# Patient Record
Sex: FEMALE | Race: WHITE | Employment: OTHER | URBAN - METROPOLITAN AREA
[De-identification: names, ages, dates, MRNs, and addresses within clinical notes are randomized per-mention and may not be internally consistent; named-entity substitution may affect disease eponyms.]

---

## 2017-02-16 PROCEDURE — 88305 TISSUE EXAM BY PATHOLOGIST: CPT | Performed by: OBSTETRICS & GYNECOLOGY

## 2017-02-16 PROCEDURE — 88175 CYTOPATH C/V AUTO FLUID REDO: CPT | Performed by: OBSTETRICS & GYNECOLOGY

## 2017-02-16 PROCEDURE — 87624 HPV HI-RISK TYP POOLED RSLT: CPT | Performed by: OBSTETRICS & GYNECOLOGY

## 2017-03-01 PROBLEM — E78.5 HYPERLIPIDEMIA, UNSPECIFIED HYPERLIPIDEMIA TYPE: Status: ACTIVE | Noted: 2017-03-01

## 2017-03-01 PROCEDURE — 80053 COMPREHEN METABOLIC PANEL: CPT | Performed by: INTERNAL MEDICINE

## 2017-03-01 PROCEDURE — 85025 COMPLETE CBC W/AUTO DIFF WBC: CPT | Performed by: INTERNAL MEDICINE

## 2017-03-01 PROCEDURE — 36415 COLL VENOUS BLD VENIPUNCTURE: CPT | Performed by: INTERNAL MEDICINE

## 2017-03-01 PROCEDURE — 82570 ASSAY OF URINE CREATININE: CPT | Performed by: INTERNAL MEDICINE

## 2017-03-01 PROCEDURE — 83036 HEMOGLOBIN GLYCOSYLATED A1C: CPT | Performed by: INTERNAL MEDICINE

## 2017-03-01 PROCEDURE — 80061 LIPID PANEL: CPT | Performed by: INTERNAL MEDICINE

## 2017-03-01 PROCEDURE — 82043 UR ALBUMIN QUANTITATIVE: CPT | Performed by: INTERNAL MEDICINE

## 2017-04-24 PROBLEM — N19 RENAL FAILURE: Status: ACTIVE | Noted: 2017-04-24

## 2017-04-24 PROBLEM — N17.9 ACUTE RENAL FAILURE (ARF) (HCC): Status: ACTIVE | Noted: 2017-04-24

## 2017-04-24 PROCEDURE — 80050 GENERAL HEALTH PANEL: CPT | Performed by: INTERNAL MEDICINE

## 2017-04-24 PROCEDURE — 36415 COLL VENOUS BLD VENIPUNCTURE: CPT | Performed by: INTERNAL MEDICINE

## 2017-04-24 PROCEDURE — 82150 ASSAY OF AMYLASE: CPT | Performed by: INTERNAL MEDICINE

## 2017-04-24 PROCEDURE — 83690 ASSAY OF LIPASE: CPT | Performed by: INTERNAL MEDICINE

## 2017-04-24 PROCEDURE — 84439 ASSAY OF FREE THYROXINE: CPT | Performed by: INTERNAL MEDICINE

## 2017-04-24 RX ORDER — HEPARIN SODIUM 5000 [USP'U]/ML
5000 INJECTION, SOLUTION INTRAVENOUS; SUBCUTANEOUS EVERY 12 HOURS
Status: DISCONTINUED | OUTPATIENT
Start: 2017-04-25 | End: 2017-04-29

## 2017-04-24 RX ORDER — ACETAMINOPHEN 325 MG/1
650 TABLET ORAL EVERY 6 HOURS PRN
Status: DISCONTINUED | OUTPATIENT
Start: 2017-04-24 | End: 2017-04-29

## 2017-04-24 RX ORDER — SODIUM CHLORIDE 9 MG/ML
INJECTION, SOLUTION INTRAVENOUS CONTINUOUS
Status: DISCONTINUED | OUTPATIENT
Start: 2017-04-24 | End: 2017-04-27

## 2017-04-24 RX ORDER — DEXTROSE MONOHYDRATE 25 G/50ML
50 INJECTION, SOLUTION INTRAVENOUS AS NEEDED
Status: DISCONTINUED | OUTPATIENT
Start: 2017-04-24 | End: 2017-04-29

## 2017-04-24 RX ORDER — ONDANSETRON 2 MG/ML
4 INJECTION INTRAMUSCULAR; INTRAVENOUS EVERY 6 HOURS PRN
Status: DISCONTINUED | OUTPATIENT
Start: 2017-04-24 | End: 2017-04-29

## 2017-04-24 RX ORDER — AMLODIPINE BESYLATE 10 MG/1
10 TABLET ORAL DAILY
Status: DISCONTINUED | OUTPATIENT
Start: 2017-04-24 | End: 2017-04-26

## 2017-04-25 ENCOUNTER — APPOINTMENT (OUTPATIENT)
Dept: GENERAL RADIOLOGY | Facility: HOSPITAL | Age: 62
DRG: 683 | End: 2017-04-25
Attending: INTERNAL MEDICINE
Payer: COMMERCIAL

## 2017-04-25 ENCOUNTER — HOSPITAL ENCOUNTER (INPATIENT)
Facility: HOSPITAL | Age: 62
LOS: 4 days | Discharge: HOME | DRG: 683 | End: 2017-04-29
Attending: INTERNAL MEDICINE | Admitting: INTERNAL MEDICINE
Payer: COMMERCIAL

## 2017-04-25 ENCOUNTER — APPOINTMENT (OUTPATIENT)
Dept: ULTRASOUND IMAGING | Facility: HOSPITAL | Age: 62
DRG: 683 | End: 2017-04-25
Attending: INTERNAL MEDICINE
Payer: COMMERCIAL

## 2017-04-25 DIAGNOSIS — N17.1 ACUTE RENAL FAILURE WITH ACUTE CORTICAL NECROSIS (HCC): Primary | ICD-10-CM

## 2017-04-25 PROBLEM — E78.5 HYPERLIPIDEMIA, UNSPECIFIED HYPERLIPIDEMIA TYPE: Status: RESOLVED | Noted: 2017-03-01 | Resolved: 2017-04-25

## 2017-04-25 PROBLEM — E87.5 HYPERKALEMIA: Status: ACTIVE | Noted: 2017-04-25

## 2017-04-25 PROBLEM — N19 RENAL FAILURE: Status: RESOLVED | Noted: 2017-04-24 | Resolved: 2017-04-25

## 2017-04-25 PROCEDURE — 76770 US EXAM ABDO BACK WALL COMP: CPT

## 2017-04-25 PROCEDURE — 93005 ELECTROCARDIOGRAM TRACING: CPT

## 2017-04-25 PROCEDURE — 82962 GLUCOSE BLOOD TEST: CPT

## 2017-04-25 PROCEDURE — 84132 ASSAY OF SERUM POTASSIUM: CPT | Performed by: INTERNAL MEDICINE

## 2017-04-25 PROCEDURE — 83036 HEMOGLOBIN GLYCOSYLATED A1C: CPT | Performed by: INTERNAL MEDICINE

## 2017-04-25 PROCEDURE — 96360 HYDRATION IV INFUSION INIT: CPT

## 2017-04-25 PROCEDURE — 85025 COMPLETE CBC W/AUTO DIFF WBC: CPT | Performed by: INTERNAL MEDICINE

## 2017-04-25 PROCEDURE — 93010 ELECTROCARDIOGRAM REPORT: CPT | Performed by: INTERNAL MEDICINE

## 2017-04-25 PROCEDURE — 87086 URINE CULTURE/COLONY COUNT: CPT | Performed by: INTERNAL MEDICINE

## 2017-04-25 PROCEDURE — 80053 COMPREHEN METABOLIC PANEL: CPT | Performed by: INTERNAL MEDICINE

## 2017-04-25 PROCEDURE — 71020 XR CHEST PA + LAT CHEST (CPT=71020): CPT

## 2017-04-25 PROCEDURE — 81001 URINALYSIS AUTO W/SCOPE: CPT | Performed by: INTERNAL MEDICINE

## 2017-04-25 PROCEDURE — 85610 PROTHROMBIN TIME: CPT | Performed by: INTERNAL MEDICINE

## 2017-04-25 RX ORDER — SODIUM POLYSTYRENE SULFONATE 15 G/60ML
15 SUSPENSION ORAL; RECTAL ONCE
Status: COMPLETED | OUTPATIENT
Start: 2017-04-25 | End: 2017-04-25

## 2017-04-25 RX ORDER — SODIUM POLYSTYRENE SULFONATE 15 G/60ML
30 SUSPENSION ORAL; RECTAL ONCE
Status: COMPLETED | OUTPATIENT
Start: 2017-04-25 | End: 2017-04-25

## 2017-04-25 RX ORDER — 0.9 % SODIUM CHLORIDE 0.9 %
VIAL (ML) INJECTION
Status: COMPLETED
Start: 2017-04-25 | End: 2017-04-25

## 2017-04-25 NOTE — CONSULTS
[facility] HOSP - [facility]    Report of Consultation    Heidi Vivas Patient Status:  Inpatient    3/7/1955 MRN [id number]   Location [facility] 1SW Attending Steve Calles MD   Hosp Day # 0 PCP Annette Rudolph MD     Date of Admis g Oral Once   AmLODIPine Besylate (NORVASC) tab 10 mg 10 mg Oral Daily   0.9%  NaCl infusion  Intravenous Continuous   Heparin Sodium (Porcine) 5000 UNIT/ML injection 5,000 Units 5,000 Units Subcutaneous Q12H   acetaminophen (TYLENOL) tab 650 mg 650 mg Ora source Oral, resp. rate 18, height 5' 6\" (1.676 m), weight 371 lb 3.2 oz (168.375 kg), SpO2 98 %, not currently breastfeeding.   No intake or output data in the 24 hours ending 04/25/17 0939  Wt Readings from Last 1 Encounters:  04/25/17 : 371 lb 3.2 oz (1 bilateral LE. Obesity, Class II, BMI 35-39.9, with co morbidities               Thank you for allowing me to participate in the care of your patient.     Michael Smith  4/25/2017

## 2017-04-26 ENCOUNTER — APPOINTMENT (OUTPATIENT)
Dept: INTERVENTIONAL RADIOLOGY/VASCULAR | Facility: HOSPITAL | Age: 62
DRG: 683 | End: 2017-04-26
Attending: INTERNAL MEDICINE
Payer: COMMERCIAL

## 2017-04-26 PROCEDURE — 80069 RENAL FUNCTION PANEL: CPT | Performed by: INTERNAL MEDICINE

## 2017-04-26 PROCEDURE — 84132 ASSAY OF SERUM POTASSIUM: CPT | Performed by: INTERNAL MEDICINE

## 2017-04-26 PROCEDURE — 77001 FLUOROGUIDE FOR VEIN DEVICE: CPT

## 2017-04-26 PROCEDURE — 36558 INSERT TUNNELED CV CATH: CPT

## 2017-04-26 PROCEDURE — 5A1D60Z PERFORMANCE OF URINARY FILTRATION, MULTIPLE: ICD-10-PCS | Performed by: INTERNAL MEDICINE

## 2017-04-26 PROCEDURE — 90935 HEMODIALYSIS ONE EVALUATION: CPT

## 2017-04-26 PROCEDURE — 82962 GLUCOSE BLOOD TEST: CPT

## 2017-04-26 PROCEDURE — 87340 HEPATITIS B SURFACE AG IA: CPT | Performed by: INTERNAL MEDICINE

## 2017-04-26 PROCEDURE — 02H633Z INSERTION OF INFUSION DEVICE INTO RIGHT ATRIUM, PERCUTANEOUS APPROACH: ICD-10-PCS | Performed by: RADIOLOGY

## 2017-04-26 RX ORDER — HEPARIN SODIUM 1000 [USP'U]/ML
INJECTION, SOLUTION INTRAVENOUS; SUBCUTANEOUS
Status: COMPLETED
Start: 2017-04-26 | End: 2017-04-26

## 2017-04-26 RX ORDER — 0.9 % SODIUM CHLORIDE 0.9 %
VIAL (ML) INJECTION
Status: COMPLETED
Start: 2017-04-26 | End: 2017-04-26

## 2017-04-26 RX ORDER — SODIUM POLYSTYRENE SULFONATE 15 G/60ML
30 SUSPENSION ORAL; RECTAL ONCE
Status: DISCONTINUED | OUTPATIENT
Start: 2017-04-26 | End: 2017-04-29

## 2017-04-26 RX ORDER — MIDAZOLAM HYDROCHLORIDE 1 MG/ML
INJECTION INTRAMUSCULAR; INTRAVENOUS
Status: COMPLETED
Start: 2017-04-26 | End: 2017-04-26

## 2017-04-26 RX ORDER — LIDOCAINE HYDROCHLORIDE 20 MG/ML
INJECTION, SOLUTION EPIDURAL; INFILTRATION; INTRACAUDAL; PERINEURAL
Status: COMPLETED
Start: 2017-04-26 | End: 2017-04-26

## 2017-04-26 RX ORDER — ALBUMIN (HUMAN) 12.5 G/50ML
100 SOLUTION INTRAVENOUS AS NEEDED
Status: DISCONTINUED | OUTPATIENT
Start: 2017-04-26 | End: 2017-04-29

## 2017-04-26 NOTE — PROCEDURES
[facility] HOSP - [facility]  Procedure Note    Francois Childers Patient Status:  Inpatient    3/7/1955 MRN [id number]   Location [facility] Attending Yanet Villafana MD   Hosp Day # 1 PCP Delanna Holter, MD     Proce

## 2017-04-26 NOTE — PROGRESS NOTES
[place] [facility] - [facility]    Progress Note    Zarina Salmeron Patient Status:  Inpatient    3/7/1955 MRN [id number]   Location [facility] 5SW/SE Attending John Vaca MD   Hosp Day # 1 PCP Anegl Barcenas MD       Subjective:   Mar to PCP       HTN (hypertension)    BP noted. Will d/c Amlodipine for now .        Lymphedema /   Obesity   Chronic         Results:     Lab Results  Component Value Date   WBC 8.1 04/25/2017   HGB 11.4* 04/25/2017   HCT 34.6* 04/25/2017    04/25/2017

## 2017-04-26 NOTE — PROGRESS NOTES
[facility]    Progress Note    Krystyna Gutierrez Patient Status:  Inpatient    3/7/1955 MRN [id number]   Location [facility] 5SW/SE Attending Elizabeth Mark MD   Hosp Day # 1 PCP Freddy Wong MD     Subjective:     C 04/25/2017 at 14:01 by Anjana Grover MD      Assessment and Plan:   Principal Problem:    Acute renal failure (ARF) ([facility] [address]  Active Problems:    Type 2 diabetes mellitus without complication ([facility] [address]    HTN (hypertension)    Lymphedema    Obesity, Class II, B

## 2017-04-26 NOTE — PLAN OF CARE
Problem: Patient/Family Goals  Goal: Patient/Family Long Term Goal  Patient’s Long Term Goal: Go home    Interventions:  Admin meds as ordered  Monitor labs  VS  Follow up with MD as ordered  - See additional Care Plan goals for specific interventions   Ou

## 2017-04-27 PROCEDURE — 90935 HEMODIALYSIS ONE EVALUATION: CPT

## 2017-04-27 PROCEDURE — 82962 GLUCOSE BLOOD TEST: CPT

## 2017-04-27 PROCEDURE — 80069 RENAL FUNCTION PANEL: CPT | Performed by: INTERNAL MEDICINE

## 2017-04-27 RX ORDER — HEPARIN SODIUM 1000 [USP'U]/ML
1.5 INJECTION, SOLUTION INTRAVENOUS; SUBCUTANEOUS ONCE
Status: DISCONTINUED | OUTPATIENT
Start: 2017-04-27 | End: 2017-04-29

## 2017-04-27 RX ORDER — ALBUMIN (HUMAN) 12.5 G/50ML
100 SOLUTION INTRAVENOUS AS NEEDED
Status: DISCONTINUED | OUTPATIENT
Start: 2017-04-27 | End: 2017-04-29

## 2017-04-27 NOTE — DIABETES ED
Pt seen at bedside related to her diabetes self management. She does not regularly test her BG at home. However, she has a meter and current testing supplies and does test when she feels symptomatic.   She states she has rarely had a low blood sugar react

## 2017-04-27 NOTE — DISCHARGE PLANNING
Pt has started HD treatments. LYLE spoke with the nephrologist who stated the pt will not be ESRD, only acute kidney injury. Nephrologist provided the approval for LYLE to coordinate paperwork to start the process for dialysis in the community.     LYLE met with

## 2017-04-27 NOTE — PROGRESS NOTES
[facility]    Progress Note    Keerthi Sizer Patient Status:  Inpatient    3/7/1955 MRN [id number]   Location [facility] 5SW/SE Attending Jessie Darby MD   Hosp Day # 2 PCP Tripp Baca MD     Subjective:     C by IR  Olinda under reasonable control  BP stable      Maricruz Walsh MD  4/27/2017

## 2017-04-27 NOTE — PLAN OF CARE
Problem: Patient/Family Goals  Goal: Patient/Family Long Term Goal  Patient’s Long Term Goal: Go home    Interventions:  Admin meds as ordered  Monitor labs  VS  Follow up with MD as ordered  - See additional Care Plan goals for specific interventions   Ou abdominal folds, nystatin powder applied. Family staying overnight. Vitals stable.

## 2017-04-27 NOTE — PLAN OF CARE
Patient had dialysis catheter placed today; had first session of hemodialysis today; bariatric bed in room; family at bedside; all needs attended.

## 2017-04-28 ENCOUNTER — APPOINTMENT (OUTPATIENT)
Dept: INTERVENTIONAL RADIOLOGY/VASCULAR | Facility: HOSPITAL | Age: 62
DRG: 683 | End: 2017-04-28
Attending: INTERNAL MEDICINE
Payer: COMMERCIAL

## 2017-04-28 VITALS
WEIGHT: 293 LBS | RESPIRATION RATE: 18 BRPM | DIASTOLIC BLOOD PRESSURE: 71 MMHG | HEART RATE: 74 BPM | TEMPERATURE: 98 F | OXYGEN SATURATION: 96 % | HEIGHT: 66 IN | BODY MASS INDEX: 47.09 KG/M2 | SYSTOLIC BLOOD PRESSURE: 152 MMHG

## 2017-04-28 PROCEDURE — 85025 COMPLETE CBC W/AUTO DIFF WBC: CPT | Performed by: INTERNAL MEDICINE

## 2017-04-28 PROCEDURE — 85610 PROTHROMBIN TIME: CPT | Performed by: RADIOLOGY

## 2017-04-28 PROCEDURE — 85018 HEMOGLOBIN: CPT | Performed by: RADIOLOGY

## 2017-04-28 PROCEDURE — 82962 GLUCOSE BLOOD TEST: CPT

## 2017-04-28 PROCEDURE — 50200 RENAL BIOPSY PERQ: CPT

## 2017-04-28 PROCEDURE — 80069 RENAL FUNCTION PANEL: CPT | Performed by: INTERNAL MEDICINE

## 2017-04-28 PROCEDURE — 88305 TISSUE EXAM BY PATHOLOGIST: CPT | Performed by: INTERNAL MEDICINE

## 2017-04-28 PROCEDURE — 88325 CONSLTJ COMPRE RVW REC REPRT: CPT | Performed by: INTERNAL MEDICINE

## 2017-04-28 PROCEDURE — 0TB13ZX EXCISION OF LEFT KIDNEY, PERCUTANEOUS APPROACH, DIAGNOSTIC: ICD-10-PCS | Performed by: RADIOLOGY

## 2017-04-28 PROCEDURE — 76942 ECHO GUIDE FOR BIOPSY: CPT

## 2017-04-28 PROCEDURE — 85018 HEMOGLOBIN: CPT | Performed by: INTERNAL MEDICINE

## 2017-04-28 PROCEDURE — 85014 HEMATOCRIT: CPT | Performed by: INTERNAL MEDICINE

## 2017-04-28 RX ORDER — LIDOCAINE HYDROCHLORIDE 20 MG/ML
INJECTION, SOLUTION EPIDURAL; INFILTRATION; INTRACAUDAL; PERINEURAL
Status: COMPLETED
Start: 2017-04-28 | End: 2017-04-28

## 2017-04-28 RX ORDER — SODIUM CHLORIDE 9 MG/ML
INJECTION, SOLUTION INTRAVENOUS
Status: DISPENSED
Start: 2017-04-28 | End: 2017-04-28

## 2017-04-28 RX ORDER — ONDANSETRON 2 MG/ML
4 INJECTION INTRAMUSCULAR; INTRAVENOUS EVERY 6 HOURS PRN
Status: DISCONTINUED | OUTPATIENT
Start: 2017-04-28 | End: 2017-04-28

## 2017-04-28 RX ORDER — MIDAZOLAM HYDROCHLORIDE 1 MG/ML
INJECTION INTRAMUSCULAR; INTRAVENOUS
Status: DISPENSED
Start: 2017-04-28 | End: 2017-04-28

## 2017-04-28 RX ORDER — MIDAZOLAM HYDROCHLORIDE 1 MG/ML
1 INJECTION INTRAMUSCULAR; INTRAVENOUS EVERY 5 MIN PRN
Status: DISCONTINUED | OUTPATIENT
Start: 2017-04-28 | End: 2017-04-28

## 2017-04-28 RX ORDER — ALBUMIN (HUMAN) 12.5 G/50ML
100 SOLUTION INTRAVENOUS AS NEEDED
Status: DISCONTINUED | OUTPATIENT
Start: 2017-04-29 | End: 2017-04-29

## 2017-04-28 RX ORDER — FLUTICASONE PROPIONATE 50 MCG
2 SPRAY, SUSPENSION (ML) NASAL DAILY
Status: DISCONTINUED | OUTPATIENT
Start: 2017-04-28 | End: 2017-04-29

## 2017-04-28 RX ORDER — HEPARIN SODIUM 1000 [USP'U]/ML
1.5 INJECTION, SOLUTION INTRAVENOUS; SUBCUTANEOUS ONCE
Status: DISCONTINUED | OUTPATIENT
Start: 2017-04-29 | End: 2017-04-29

## 2017-04-28 RX ORDER — MIDAZOLAM HYDROCHLORIDE 1 MG/ML
INJECTION INTRAMUSCULAR; INTRAVENOUS
Status: DISCONTINUED
Start: 2017-04-28 | End: 2017-04-28

## 2017-04-28 RX ORDER — HEPARIN SODIUM 1000 [USP'U]/ML
INJECTION, SOLUTION INTRAVENOUS; SUBCUTANEOUS
Status: COMPLETED
Start: 2017-04-28 | End: 2017-04-28

## 2017-04-28 RX ORDER — ONDANSETRON 2 MG/ML
INJECTION INTRAMUSCULAR; INTRAVENOUS
Status: COMPLETED
Start: 2017-04-28 | End: 2017-04-28

## 2017-04-28 RX ORDER — HYDROCODONE BITARTRATE AND ACETAMINOPHEN 5; 325 MG/1; MG/1
1 TABLET ORAL EVERY 6 HOURS PRN
Status: DISCONTINUED | OUTPATIENT
Start: 2017-04-28 | End: 2017-04-29

## 2017-04-28 RX ORDER — CETIRIZINE HYDROCHLORIDE 10 MG/1
10 TABLET ORAL
Status: DISCONTINUED | OUTPATIENT
Start: 2017-04-28 | End: 2017-04-29

## 2017-04-28 NOTE — PLAN OF CARE
Problem: Patient/Family Goals  Goal: Patient/Family Long Term Goal  Patient’s Long Term Goal: Go home    Interventions:  Admin meds as ordered  Monitor labs  VS  Follow up with MD as ordered  - See additional Care Plan goals for specific interventions   Ou by assist and a cane. Fall precaution maintained. Patient for dialysis tomorrow. [facility]. Patient had CT guided renal biopsy today but was unsuccessful. Dressing to left lower back dry and intact.  Plan is for pt to go home after [facility]

## 2017-04-28 NOTE — DISCHARGE PLANNING
LYLE spoke with nephrologist who states the plan is now for [facility]. LYLE confirmed with Maeve Chew who states the pt would be on Tue/Thur/Sat 10am. Pt would start on Tuesday May 2 at 9:30am, as to complete the initial paperwork.  LYLE sent flowsheets to [facility] a

## 2017-04-28 NOTE — PLAN OF CARE
Problem: Patient/Family Goals  Goal: Patient/Family Long Term Goal  Patient’s Long Term Goal: Go home    Interventions:  Admin meds as ordered  Monitor labs  VS  Follow up with MD as ordered  - See additional Care Plan goals for specific interventions   Ou CHG bath done last night, will repeat this am prior procedure. Vitals stable.

## 2017-04-28 NOTE — PROGRESS NOTES
[facility]    Progress Note    Alejandra Drilling Patient Status:  Inpatient    3/7/1955 MRN [id number]   Location [facility] Attending Evelina Parker MD   Hosp Day # 3 PCP MD Brianna Bloom K 4.3 04/28/2017    04/28/2017   CO2 25 04/28/2017   * 04/28/2017   CA 9.2 04/28/2017   ALB 3.7 04/28/2017   ALKPHO 51 04/25/2017   BILT 0.6 04/25/2017   TP 7.7 04/25/2017   AST 22 04/25/2017   ALT 24 04/25/2017   INR 1.1 04/28/2017   T4F 1.

## 2017-04-28 NOTE — PLAN OF CARE
Patient a/ox4; denies pain; vss; went down for hemodialysis today; son at bedside; call light within reach.

## 2017-04-28 NOTE — PROGRESS NOTES
Patient pre scanned by Ultrasound to visualize kidney prior to biopsy. Difficult venipuncture for labs, after two attempts, labs drawn from brown port with ease and reheparinized as per standards.

## 2017-04-28 NOTE — PROGRESS NOTES
[facility]    Progress Note    Dorette Like Patient Status:  Inpatient    3/7/1955 MRN [id number]   Location [facility] 5SW/SE Attending Rachel Hayden MD   Hosp Day # 3 PCP Chaitanya Bolanos MD     Subjective:     C without complication (HCC)    HTN (hypertension)    Lymphedema    Obesity, Class II, BMI 35-39.9, with comorbidity    Hyperkalemia    Patient has tolerated first 2 dialysis sessions. Her potassium is now normalized. Unable to perform kidney biopsy.   Dionne

## 2017-04-28 NOTE — PROCEDURES
[facility] HOSP - [facility]  Procedure Note    Danica Bile Patient Status:  Inpatient    3/7/1955 MRN [id number]   Location [facility] Attending Pillo Macario MD   Hosp Day # 3 PCP Lin Bahena MD     Proce

## 2017-04-29 PROCEDURE — 90935 HEMODIALYSIS ONE EVALUATION: CPT

## 2017-04-29 PROCEDURE — 82962 GLUCOSE BLOOD TEST: CPT

## 2017-04-29 PROCEDURE — 81001 URINALYSIS AUTO W/SCOPE: CPT | Performed by: INTERNAL MEDICINE

## 2017-04-29 PROCEDURE — 85025 COMPLETE CBC W/AUTO DIFF WBC: CPT | Performed by: INTERNAL MEDICINE

## 2017-04-29 PROCEDURE — 80069 RENAL FUNCTION PANEL: CPT | Performed by: INTERNAL MEDICINE

## 2017-04-29 NOTE — PROGRESS NOTES
Notified Dr León Citizen the patient was able to void after rudolph removal but was having about 100 cc of blood clot. patient vital stable and will be d/c after dialysis.

## 2017-04-29 NOTE — PROGRESS NOTES
[facility] HOSP - [facility]    Progress Note    Jesusra Essex Patient Status:  Inpatient    3/7/1955 MRN [id number]   Location [facility] Attending Kendall Dandy, MD   Hosp Day # 4 PCP MD Genesis Waters reviewed. Cont same. Pt a bit upset that kidney fx not  Improving. She remains quite hopeful for recovery though. I explained we will cont to monitor as an outpt at dialysis. Spoke with Dr Tere King. Ashley Godfrey as well. Discharge planning discussed as well.

## 2017-04-30 ENCOUNTER — TELEPHONE (OUTPATIENT)
Dept: MEDSURG UNIT | Facility: HOSPITAL | Age: 62
End: 2017-04-30

## 2017-05-01 NOTE — DISCHARGE SUMMARY
[facility]    Discharge Summary    Ranulfo Mckeon Patient Status:  Inpatient    3/7/1955 MRN [id number]   Location [facility] 5SW/SE Attending No att. providers found   [facility] Day # 4 PCP Jaylyn Arredondo MD     Date of Ad outpatient.       Discharge Condition: Stable    Discharge Medications:      Discharge Medications      CONTINUE taking these medications       Instructions Prescription details    ACCU-CHEK AFIA SMARTVIEW w/Device Kit        1 strip by Does not apply route

## 2017-05-01 NOTE — PROGRESS NOTES
[place] [facility] - [facility]    Progress Note    Krystyna Gutierrez Patient Status:  Inpatient    3/7/1955 MRN [id number]   Location [facility] 5SW/SE Attending No att. providers found   Hosp Day # 4 PCP Freddy Wong MD       Subjective:

## 2017-05-03 NOTE — PAYOR COMM NOTE
PER [facility]CHANGE INPT AUTH #[id number] 3DAYS- REQUESTING ADDITIONAL DAYS      History and Physical  Dread Brambila  Patient Status:  Inpatient    Gary Miles 3/7/1955  MRN  [id number]    [facility] 1SW  Attending  Carlos Manuel Greenwood MD  Sister          sinus      Social History:    Smoking Status: Never Smoker                       Alcohol Use: JESSICA                   Allergies/Medications:    Allergies:   Barium                     Iodine (Topical)           Radiology Contrast *       Tjo for heartburn, abdominal pain, diarrhea and constipation. Endocrine: Negative for cold intolerance and heat intolerance. Genitourinary: Negative for dysuria, urgency, frequency, hematuria and menstrual problem.    Musculoskeletal: Positive for back pain HGB  11.4*  04/25/2017    HCT  34.6*  04/25/2017    PLT  151  04/25/2017    CREATSERUM  10.30*  04/24/2017    BUN  85*  04/24/2017    NA  139  04/24/2017    K  5.9*  04/24/2017    CL  110  04/24/2017    CO2  18.0*  04/24/2017    GLU  87  04/24/2017    CA agent.    Her chronic lymphedema has actually improved. Patient is supposed to be using compression stockings daily but is not always very compliant with this.     DVT prophylaxis with subcu heparin will be ordered               4/28/17  Subjective:      C failure (ARF) ([facility] [address]  Active Problems:    Type 2 diabetes mellitus without complication (HCC)    HTN (hypertension)    Lymphedema    Obesity, Class II, BMI 35-39.9, with comorbidity    Hyperkalemia    Patient has tolerated first 2 dialysis sessions.   Her po

## 2017-05-04 PROCEDURE — 86335 IMMUNFIX E-PHORSIS/URINE/CSF: CPT | Performed by: INTERNAL MEDICINE

## 2017-05-04 PROCEDURE — 86334 IMMUNOFIX E-PHORESIS SERUM: CPT | Performed by: INTERNAL MEDICINE

## 2017-05-04 PROCEDURE — 83883 ASSAY NEPHELOMETRY NOT SPEC: CPT | Performed by: INTERNAL MEDICINE

## 2017-05-04 PROCEDURE — 36415 COLL VENOUS BLD VENIPUNCTURE: CPT | Performed by: INTERNAL MEDICINE

## 2017-05-04 PROCEDURE — 84165 PROTEIN E-PHORESIS SERUM: CPT | Performed by: INTERNAL MEDICINE

## 2017-05-04 PROCEDURE — 84156 ASSAY OF PROTEIN URINE: CPT | Performed by: INTERNAL MEDICINE

## 2017-05-15 ENCOUNTER — APPOINTMENT (OUTPATIENT)
Dept: LAB | Age: 62
End: 2017-05-15
Attending: INTERNAL MEDICINE
Payer: COMMERCIAL

## 2017-05-15 DIAGNOSIS — C90.00 MULTIPLE MYELOMA NOT HAVING ACHIEVED REMISSION (HCC): ICD-10-CM

## 2017-05-15 PROBLEM — N08: Status: ACTIVE | Noted: 2017-05-15

## 2017-05-15 PROBLEM — N08 MYELOMA CAST NEPHROPATHY (HCC): Status: ACTIVE | Noted: 2017-05-15

## 2017-05-15 PROBLEM — N08 MYELOMA CAST NEPHROPATHY: Status: ACTIVE | Noted: 2017-05-15

## 2017-05-15 PROCEDURE — 88275 CYTOGENETICS 100-300: CPT | Performed by: INTERNAL MEDICINE

## 2017-05-15 PROCEDURE — 88264 CHROMOSOME ANALYSIS 20-25: CPT | Performed by: INTERNAL MEDICINE

## 2017-05-15 PROCEDURE — 85097 BONE MARROW INTERPRETATION: CPT

## 2017-05-15 PROCEDURE — 84165 PROTEIN E-PHORESIS SERUM: CPT | Performed by: INTERNAL MEDICINE

## 2017-05-15 PROCEDURE — 88184 FLOWCYTOMETRY/ TC 1 MARKER: CPT | Performed by: INTERNAL MEDICINE

## 2017-05-15 PROCEDURE — 88342 IMHCHEM/IMCYTCHM 1ST ANTB: CPT

## 2017-05-15 PROCEDURE — 83883 ASSAY NEPHELOMETRY NOT SPEC: CPT | Performed by: INTERNAL MEDICINE

## 2017-05-15 PROCEDURE — 88305 TISSUE EXAM BY PATHOLOGIST: CPT

## 2017-05-15 PROCEDURE — 88185 FLOWCYTOMETRY/TC ADD-ON: CPT | Performed by: INTERNAL MEDICINE

## 2017-05-15 PROCEDURE — 88341 IMHCHEM/IMCYTCHM EA ADD ANTB: CPT

## 2017-05-15 PROCEDURE — 88311 DECALCIFY TISSUE: CPT

## 2017-05-15 PROCEDURE — 88271 CYTOGENETICS DNA PROBE: CPT | Performed by: INTERNAL MEDICINE

## 2017-05-15 PROCEDURE — 88313 SPECIAL STAINS GROUP 2: CPT

## 2017-05-15 PROCEDURE — 86334 IMMUNOFIX E-PHORESIS SERUM: CPT | Performed by: INTERNAL MEDICINE

## 2017-05-15 PROCEDURE — 82232 ASSAY OF BETA-2 PROTEIN: CPT | Performed by: INTERNAL MEDICINE

## 2017-05-15 PROCEDURE — 88237 TISSUE CULTURE BONE MARROW: CPT | Performed by: INTERNAL MEDICINE

## 2017-06-13 ENCOUNTER — HOSPITAL ENCOUNTER (INPATIENT)
Facility: HOSPITAL | Age: 62
LOS: 2 days | Discharge: HOME | DRG: 683 | End: 2017-06-15
Attending: EMERGENCY MEDICINE | Admitting: INTERNAL MEDICINE
Payer: COMMERCIAL

## 2017-06-13 ENCOUNTER — APPOINTMENT (OUTPATIENT)
Dept: GENERAL RADIOLOGY | Facility: HOSPITAL | Age: 62
DRG: 683 | End: 2017-06-13
Attending: EMERGENCY MEDICINE
Payer: COMMERCIAL

## 2017-06-13 DIAGNOSIS — R50.9 FEVER, UNSPECIFIED FEVER CAUSE: Primary | ICD-10-CM

## 2017-06-13 DIAGNOSIS — N18.6 ESRD (END STAGE RENAL DISEASE) (HCC): ICD-10-CM

## 2017-06-13 PROBLEM — E87.1 HYPONATREMIA: Status: RESOLVED | Noted: 2017-06-13 | Resolved: 2017-06-13

## 2017-06-13 PROBLEM — N17.9 ACUTE KIDNEY INJURY (HCC): Status: RESOLVED | Noted: 2017-06-13 | Resolved: 2017-06-13

## 2017-06-13 PROBLEM — R73.9 HYPERGLYCEMIA: Status: ACTIVE | Noted: 2017-06-13

## 2017-06-13 PROBLEM — N17.9 ACUTE KIDNEY INJURY (HCC): Status: ACTIVE | Noted: 2017-06-13

## 2017-06-13 PROBLEM — E87.1 HYPONATREMIA: Status: ACTIVE | Noted: 2017-06-13

## 2017-06-13 PROBLEM — D64.9 ANEMIA: Status: ACTIVE | Noted: 2017-06-13

## 2017-06-13 PROBLEM — C90.00 MULTIPLE MYELOMA (HCC): Status: ACTIVE | Noted: 2017-06-13

## 2017-06-13 PROCEDURE — 87040 BLOOD CULTURE FOR BACTERIA: CPT | Performed by: EMERGENCY MEDICINE

## 2017-06-13 PROCEDURE — 83605 ASSAY OF LACTIC ACID: CPT | Performed by: EMERGENCY MEDICINE

## 2017-06-13 PROCEDURE — 93010 ELECTROCARDIOGRAM REPORT: CPT | Performed by: EMERGENCY MEDICINE

## 2017-06-13 PROCEDURE — 36415 COLL VENOUS BLD VENIPUNCTURE: CPT

## 2017-06-13 PROCEDURE — 82962 GLUCOSE BLOOD TEST: CPT

## 2017-06-13 PROCEDURE — 81001 URINALYSIS AUTO W/SCOPE: CPT | Performed by: INTERNAL MEDICINE

## 2017-06-13 PROCEDURE — 96366 THER/PROPH/DIAG IV INF ADDON: CPT

## 2017-06-13 PROCEDURE — 99285 EMERGENCY DEPT VISIT HI MDM: CPT

## 2017-06-13 PROCEDURE — 71010 XR CHEST AP PORTABLE  (CPT=71010): CPT | Performed by: EMERGENCY MEDICINE

## 2017-06-13 PROCEDURE — 80048 BASIC METABOLIC PNL TOTAL CA: CPT | Performed by: EMERGENCY MEDICINE

## 2017-06-13 PROCEDURE — 96365 THER/PROPH/DIAG IV INF INIT: CPT

## 2017-06-13 PROCEDURE — 93005 ELECTROCARDIOGRAM TRACING: CPT

## 2017-06-13 PROCEDURE — 85025 COMPLETE CBC W/AUTO DIFF WBC: CPT | Performed by: EMERGENCY MEDICINE

## 2017-06-13 RX ORDER — ONDANSETRON 2 MG/ML
4 INJECTION INTRAMUSCULAR; INTRAVENOUS EVERY 6 HOURS PRN
Status: DISCONTINUED | OUTPATIENT
Start: 2017-06-13 | End: 2017-06-15

## 2017-06-13 RX ORDER — BISACODYL 10 MG
10 SUPPOSITORY, RECTAL RECTAL
Status: DISCONTINUED | OUTPATIENT
Start: 2017-06-13 | End: 2017-06-15

## 2017-06-13 RX ORDER — SODIUM PHOSPHATE, DIBASIC AND SODIUM PHOSPHATE, MONOBASIC 7; 19 G/133ML; G/133ML
1 ENEMA RECTAL ONCE AS NEEDED
Status: DISCONTINUED | OUTPATIENT
Start: 2017-06-13 | End: 2017-06-14

## 2017-06-13 RX ORDER — POLYETHYLENE GLYCOL 3350 17 G/17G
17 POWDER, FOR SOLUTION ORAL DAILY PRN
Status: DISCONTINUED | OUTPATIENT
Start: 2017-06-13 | End: 2017-06-15

## 2017-06-13 RX ORDER — HEPARIN SODIUM 5000 [USP'U]/ML
5000 INJECTION, SOLUTION INTRAVENOUS; SUBCUTANEOUS EVERY 12 HOURS SCHEDULED
Status: DISCONTINUED | OUTPATIENT
Start: 2017-06-13 | End: 2017-06-15

## 2017-06-13 RX ORDER — ACETAMINOPHEN 325 MG/1
650 TABLET ORAL EVERY 6 HOURS PRN
Status: DISCONTINUED | OUTPATIENT
Start: 2017-06-13 | End: 2017-06-15

## 2017-06-13 RX ORDER — DOCUSATE SODIUM 100 MG/1
100 CAPSULE, LIQUID FILLED ORAL 2 TIMES DAILY
Status: DISCONTINUED | OUTPATIENT
Start: 2017-06-13 | End: 2017-06-15

## 2017-06-13 NOTE — PLAN OF CARE
Diabetes/Glucose Control    • Glucose maintained within prescribed range Progressing        Patient/Family Goals    • Patient/Family Long Term Goal Progressing    • Patient/Family Short Term Goal Progressing          Pt received from ED today post HD this

## 2017-06-13 NOTE — ED INITIAL ASSESSMENT (HPI)
Patient sent down from Dialysis for fever, states she had 1 hour of dialysis. Denies any symptoms.  Patient agitated in triage, screaming 'I hate this hospital' and responding 'does it matter, you guys are going to do what your going to do' when asked quest

## 2017-06-13 NOTE — ED PROVIDER NOTES
Patient Seen in: [facility] AND [facility] Emergency Department    History   Patient presents with:  Fever (infectious)    Stated Complaint: Fever    HPI    Patient presents the emergency department with shaking chills from dialysis.   She is presently undergoing 2 tablets (100 mg total) by mouth daily. Take once a day on days 1, 8, 15, and 22   Phenylephrine-Acetaminophen (WAL-PHED PE SINUS HEADACHE) 5-325 MG Oral Tab,  Take 2 capsules by mouth as needed.    TraMADol HCl 50 MG Oral Tab,  Take 1 tablet (50 mg total) HENT:   Head: Normocephalic. Eyes: Conjunctivae and EOM are normal.   Neck: Normal range of motion. Neck supple. Cardiovascular: Normal rate and regular rhythm. No murmur heard.   Pulmonary/Chest: Effort normal and breath sounds normal. No respirat RAINBOW DRAW LAVENDER   RAINBOW DRAW LIGHT GREEN   RAINBOW DRAW DARK GREEN   RAINBOW DRAW LAVENDER TALL (BNP)   BLOOD CULTURE   BLOOD CULTURE      EKG    Rate, intervals and axes as noted on EKG Report.   Rate: 133 bpm  Rhythm: Sinus tachycardia  Reading:

## 2017-06-13 NOTE — PROGRESS NOTES
[facility] dosing service    Initial Pharmacokinetic Consult for Vancomycin Dosing     Bj Shaw is a 63year old female admitted on 6/13 who is being treated for fever.   Pharmacy has been asked to dose Vancomycin by Dr. Pedrito Coon    She is Yanet Cueva Vancomycin to assess renal function. 4.  Pharmacy will follow and monitor renal function changes, toxicity and efficacy. Pharmacy will continue to follow her. We appreciate the opportunity to assist in her care.     Brian Martin, PharmD  6/13/2017

## 2017-06-13 NOTE — ED NOTES
liana Mendoza, drawing second set of blood cultures states patient was crying and visibly upset, asking for a DNR, she alerted this writer who informed dr Sandy Hernandez, he states patient will have to speak to an admitting physician about code status if she gets

## 2017-06-14 PROCEDURE — 82962 GLUCOSE BLOOD TEST: CPT

## 2017-06-14 PROCEDURE — 80069 RENAL FUNCTION PANEL: CPT | Performed by: INTERNAL MEDICINE

## 2017-06-14 PROCEDURE — 85025 COMPLETE CBC W/AUTO DIFF WBC: CPT | Performed by: INTERNAL MEDICINE

## 2017-06-14 PROCEDURE — 87086 URINE CULTURE/COLONY COUNT: CPT | Performed by: INTERNAL MEDICINE

## 2017-06-14 RX ORDER — ALBUMIN (HUMAN) 12.5 G/50ML
100 SOLUTION INTRAVENOUS AS NEEDED
Status: DISCONTINUED | OUTPATIENT
Start: 2017-06-15 | End: 2017-06-15

## 2017-06-14 RX ORDER — DIPHENHYDRAMINE HCL 50 MG
50 CAPSULE ORAL EVERY 6 HOURS PRN
Status: DISCONTINUED | OUTPATIENT
Start: 2017-06-14 | End: 2017-06-15

## 2017-06-14 RX ORDER — HEPARIN SODIUM 1000 [USP'U]/ML
1.5 INJECTION, SOLUTION INTRAVENOUS; SUBCUTANEOUS ONCE
Status: DISCONTINUED | OUTPATIENT
Start: 2017-06-15 | End: 2017-06-15

## 2017-06-14 NOTE — PLAN OF CARE
Diabetes/Glucose Control    • Glucose maintained within prescribed range Progressing        Patient/Family Goals    • Patient/Family Long Term Goal Progressing    • Patient/Family Short Term Goal Progressing          Pt resting in bed.  Uses cane when walki

## 2017-06-14 NOTE — PLAN OF CARE
Diabetes/Glucose Control    • Glucose maintained within prescribed range Progressing        Patient/Family Goals    • Patient/Family Long Term Goal Progressing    • Patient/Family Short Term Goal Progressing          Pt aware of, and compliant with, plan o

## 2017-06-14 NOTE — H&P
[facility]    History and Physical    Keerthi Sizer Patient Status:  Inpatient    3/7/1955 MRN [id number]   Location [facility] 4W/SW/SE Attending Jessie Darby MD   Hosp Day # 0 PCP Tripp Baca MD     Date:   Inject 28 Units into the skin every evening.  (Patient taking differently: Inject 24 Units into the skin every evening.  )   Glucose Blood (BLOOD GLUCOSE TEST) In Vitro Strip Test blood sugar four times daily   insulin aspart (NOVOLOG FLEXPEN) 100 UNIT/ML S light. No scleral icterus. Neck: Neck supple. No JVD present. No thyromegaly present. Cardiovascular: Normal rate, regular rhythm, normal heart sounds and intact distal pulses. No murmur heard. Carotid bruit not present.   Pulmonary/Chest: Effort norm RATE INCREASED Electronically signed on 06/13/2017 at 17:52 by Leticia Leone      Assessment/Plan:   Principal Problem:    Fever, unspecified fever cause  Active Problems:    [facility]    Type 2 diabetes mellitus without complication ([facility]

## 2017-06-14 NOTE — CONSULTS
Hematology/Oncology Consult Note        NAME: Keyana Reid - ROOM: 875920-Z - MRN: [id number] - Age: 63year old - : 3/7/1955    Reason for Consult:  Multiple myeloma.      Carlos Paz is a 63 y.o female well known to me whom i treat for the [facility] Subcutaneous 2 times per day   • docusate sodium  100 mg Oral BID   • cefTRIAXone  1 g Intravenous Q24H   • vancomycin  750 mg Intravenous See Admin Instructions (RX holding)        ALLERGIES:   Barium                    Iodine (Topical)          Radiology myeloma here for sepsis. Sepsis  - lactate much improved  - abx per pcp    Multiple myeloma  - treatment on hold for now pending clinical progress. Anemia/ thrombocytopenia  - infection vs recent cytoxan about 10 days ago.   - will continue to [facility]

## 2017-06-14 NOTE — PROGRESS NOTES
[facility]    Progress Note    Art Aland Patient Status:  Inpatient    3/7/1955 MRN [id number]   Location [facility] 4W/SW/SE Attending Rupa King MD   Hosp Day # 1 PCP David Vizcaino MD     Subjective: at 17:52 by Leonid Bryant      Assessment and Plan:   Principal Problem:    Fever, unspecified fever cause  Active Problems:    Multiple myeloma ([facility] [address] [address]    Type 2 diabetes mellitus without complication (HCC)    HTN (hypertension)    ESRD (end stage renal di

## 2017-06-14 NOTE — CONSULTS
[facility]    Report of Consultation    Ressie Stain Patient Status:  Inpatient    3/7/1955 MRN [id number]   Location [facility] 4W/SW/SE Attending Oracio Garcia MD   Hosp Day # 1 PCP Henrry Soria MD     Date of History  Patient Guardian Status:  Not on file.     Other Topics            Concern    None on file    Social History Narrative    None on file            Current Medications:    Current Facility-Administered Medications:  insulin aspart (NOVOLOG) 100 UNIT/ Toxoid              Review of Systems:   Pertinent items are noted in HPI. Physical Exam:   Blood pressure 117/60, pulse 60, temperature 98.3 °F (36.8 °C), temperature source Oral, resp.  rate 20, weight 357 lb 2.3 oz (162 kg), SpO2 98 %, not currently noted       Lymphedema   Much improved since HD started     D/w patient & daughter Jacqueline Mark in detail. Thank you for allowing me to participate in the care of your patient.     Laurel Shore  6/14/2017

## 2017-06-15 VITALS
DIASTOLIC BLOOD PRESSURE: 48 MMHG | TEMPERATURE: 98 F | OXYGEN SATURATION: 96 % | HEIGHT: 66.93 IN | SYSTOLIC BLOOD PRESSURE: 114 MMHG | RESPIRATION RATE: 21 BRPM | BODY MASS INDEX: 45.99 KG/M2 | HEART RATE: 67 BPM | WEIGHT: 293 LBS

## 2017-06-15 PROBLEM — R21 RASH: Status: ACTIVE | Noted: 2017-06-15

## 2017-06-15 PROCEDURE — 90935 HEMODIALYSIS ONE EVALUATION: CPT

## 2017-06-15 PROCEDURE — 80069 RENAL FUNCTION PANEL: CPT | Performed by: INTERNAL MEDICINE

## 2017-06-15 PROCEDURE — 5A1D00Z PERFORMANCE OF URINARY FILTRATION, SINGLE: ICD-10-PCS | Performed by: INTERNAL MEDICINE

## 2017-06-15 PROCEDURE — 85025 COMPLETE CBC W/AUTO DIFF WBC: CPT | Performed by: INTERNAL MEDICINE

## 2017-06-15 PROCEDURE — 82962 GLUCOSE BLOOD TEST: CPT

## 2017-06-15 RX ORDER — 0.9 % SODIUM CHLORIDE 0.9 %
VIAL (ML) INJECTION
Status: COMPLETED
Start: 2017-06-15 | End: 2017-06-15

## 2017-06-15 NOTE — DISCHARGE SUMMARY
[facility]    Discharge Summary    Elizabeth Bazan Patient Status:  Inpatient    3/7/1955 MRN [id number]   Location [facility] 4W/SW/SE Attending Leigh Ann López MD   Hosp Day # 2 PCP Carmen Kwon MD     Date of Adm Instructions Prescription details    Insulin Glargine 100 UNIT/ML Sopn   Commonly known as:  Valeria Rivera   What changed:  how much to take        Inject 28 Units into the skin every evening.     Quantity:  12 pen   Refills:  3         CONTINUE ta

## 2017-06-15 NOTE — PROGRESS NOTES
[facility]    Progress Note    Anabell Sood Patient Status:  Inpatient    3/7/1955 MRN [id number]   Location [facility] 4W/SW/SE Attending Mansoor Leary MD   Hosp Day # 2 PCP Elkin Prieto MD     Subjective: fevers  Dialysis line appears clear  Therefore at this time we'll discontinue Rocephin and vancomycin. We'll plan on discharge home today. We'll continue treatment with Adalid Andres at dialysis for 10 days to treat possible line infection.   I have spoken to ne

## 2017-06-15 NOTE — PROGRESS NOTES
[facility]    Progress Note    Alejandra Drilling Patient Status:  Inpatient    3/7/1955 MRN [id number]   Location [facility] 4W/SW/SE Attending Evelina Parker MD   Hosp Day # 2 PCP Maricruz Walsh MD       Subjective:   [place] 28  26          Xr Chest Ap Portable  (cpt=71010)    6/13/2017  CONCLUSION:  1. Atherosclerotic calcification aorta. 2. No acute cardiopulmonary finding. 3. Right-sided double-lumen catheter with distal lumen in the SVC.                Norberto Domingo MD  6

## 2017-06-15 NOTE — PROGRESS NOTES
120 [facility] dosing service    Follow-up Pharmacokinetic Consult for Vancomycin Dosing     Danica Lopez is a 63year old female admitted on 6/13 who is being treated for sepsis. Patient is ESRD on HD (Tue, Thu, Sat) is being dialyzed today.  Rica Alba is being dialyzed today. 1. Pharmacy will give supplemental dose of Vancomycin 750 mg IV x 1 (5 mg/kg Adj. BW) TODAY after HD session. 2.  Pharmacy will check Vancomycin levels prior to 3rd HD session. Goal trough level 15-20 ug/mL.     3.  Pharmacy fede

## 2017-06-15 NOTE — PLAN OF CARE
During assessment, patient complained of reddness on back, notified MD Pearl Pina. PRN Benadryl order given and initiated. Plan for dialysis tomorrow still. VSS.

## 2017-06-17 ENCOUNTER — TELEPHONE (OUTPATIENT)
Dept: CARDIOLOGY UNIT | Facility: HOSPITAL | Age: 62
End: 2017-06-17

## 2017-06-21 NOTE — PAYOR COMM NOTE
--------------  ADMISSION REVIEW     Payor: NAVA SPARKS  Subscriber #:  [id number]  Authorization Number: [id number]    Admit date: 6/13/2017 11:22 AM       Patient Seen in: [facility] Emergency Department    History   Patient presents with:  Fever (inf distress   HENT:   Head: Normocephalic. Eyes: Conjunctivae and EOM are normal.   Neck: Normal range of motion. Neck supple. Cardiovascular: Normal rate and regular rhythm. No murmur heard.   Pulmonary/Chest: Effort normal and breath sounds normal. No the hospital on IV antibiotics. Cultures were sent.       Disposition and Plan     Clinical Impression:  Fever, unspecified fever cause  (primary encounter diagnosis)  ESRD (end stage renal disease) ([facility] [address]    Disposition:  Admit        Present on Admission Seasonal                  Sulfa Antibiotics         Tetanus Toxoid                Review of Systems:     Constitutional: Positive for fever and chills. Negative for appetite change and fatigue.    HENT: Negative for congestion, ear pain, mouth sores, post Abdominal: Soft. She exhibits no distension and no mass. There is no tenderness. Musculoskeletal:   Chronic lymphedema changes bilateral lower extremities. Lymphadenopathy:     She has no cervical adenopathy.    Neurological: She is alert and oriented renal failure. Empircally treat with Vanc and Ceftriaxone pending culture results. Dialysis scheduled per nephrology. No stat need for dialysis at this time. Pt is currently receiving chemo for tx multiple myeloma per oncology.     Will monitor accu Lymphedema    Hyperglycemia    Patient is medically stable. Continue with current Rocephin, vancomycin as dosed per pharmacy pending culture results. Concern is about possible line infection and hopefully this can be cleared with current antibiotics.

## 2017-06-26 PROBLEM — F32.A DEPRESSION, UNSPECIFIED DEPRESSION TYPE: Status: ACTIVE | Noted: 2017-06-26

## 2017-06-26 PROBLEM — F41.9 ANXIETY: Status: ACTIVE | Noted: 2017-06-26

## 2017-06-26 PROBLEM — N17.9 ACUTE RENAL FAILURE, UNSPECIFIED ACUTE RENAL FAILURE TYPE (HCC): Status: ACTIVE | Noted: 2017-06-26

## 2017-06-26 PROBLEM — N08: Status: ACTIVE | Noted: 2017-06-26

## 2017-06-26 PROBLEM — R45.89 TEARFULNESS: Status: ACTIVE | Noted: 2017-06-26

## 2017-06-26 PROBLEM — Z79.4 TYPE 2 DIABETES MELLITUS WITHOUT COMPLICATION, WITH LONG-TERM CURRENT USE OF INSULIN (HCC): Status: ACTIVE | Noted: 2017-06-26

## 2017-06-26 PROBLEM — N08 MYELOMA CAST NEPHROPATHY (HCC): Status: ACTIVE | Noted: 2017-06-26

## 2017-06-26 PROBLEM — C90.00: Status: ACTIVE | Noted: 2017-06-26

## 2017-06-26 PROBLEM — IMO0001 OBESITY, CLASS II, BMI 35-39.9, WITH COMORBIDITY: Status: ACTIVE | Noted: 2017-06-26

## 2017-06-26 PROBLEM — E11.9 TYPE 2 DIABETES MELLITUS WITHOUT COMPLICATION, WITH LONG-TERM CURRENT USE OF INSULIN (HCC): Status: ACTIVE | Noted: 2017-06-26

## 2017-06-26 PROBLEM — N08 MYELOMA CAST NEPHROPATHY: Status: ACTIVE | Noted: 2017-06-26

## 2017-06-26 PROBLEM — C90.00 MULTIPLE MYELOMA, REMISSION STATUS UNSPECIFIED (HCC): Status: ACTIVE | Noted: 2017-06-26

## 2017-06-26 PROBLEM — N18.6 ESRD (END STAGE RENAL DISEASE) (HCC): Status: ACTIVE | Noted: 2017-06-26

## 2017-06-26 PROBLEM — C90.00 MYELOMA CAST NEPHROPATHY: Status: ACTIVE | Noted: 2017-06-26

## 2017-06-26 PROBLEM — C90.00 MYELOMA CAST NEPHROPATHY (HCC): Status: ACTIVE | Noted: 2017-06-26

## 2017-06-26 PROBLEM — D61.9 ANEMIA DUE TO BONE MARROW FAILURE, UNSPECIFIED BONE MARROW FAILURE TYPE (HCC): Status: ACTIVE | Noted: 2017-06-26

## 2017-06-26 PROCEDURE — 86334 IMMUNOFIX E-PHORESIS SERUM: CPT | Performed by: INTERNAL MEDICINE

## 2017-06-26 PROCEDURE — 83883 ASSAY NEPHELOMETRY NOT SPEC: CPT | Performed by: INTERNAL MEDICINE

## 2017-06-26 PROCEDURE — 84165 PROTEIN E-PHORESIS SERUM: CPT | Performed by: INTERNAL MEDICINE

## 2017-06-28 ENCOUNTER — HOSPITAL ENCOUNTER (OUTPATIENT)
Dept: INTERVENTIONAL RADIOLOGY/VASCULAR | Facility: HOSPITAL | Age: 62
Discharge: HOME | End: 2017-06-28
Attending: INTERNAL MEDICINE | Admitting: INTERNAL MEDICINE
Payer: COMMERCIAL

## 2017-06-28 VITALS
OXYGEN SATURATION: 97 % | WEIGHT: 293 LBS | BODY MASS INDEX: 56 KG/M2 | DIASTOLIC BLOOD PRESSURE: 63 MMHG | RESPIRATION RATE: 19 BRPM | SYSTOLIC BLOOD PRESSURE: 137 MMHG | HEART RATE: 77 BPM

## 2017-06-28 DIAGNOSIS — N18.6 ESRD (END STAGE RENAL DISEASE) (HCC): ICD-10-CM

## 2017-06-28 PROCEDURE — 99153 MOD SED SAME PHYS/QHP EA: CPT

## 2017-06-28 PROCEDURE — 87070 CULTURE OTHR SPECIMN AEROBIC: CPT | Performed by: RADIOLOGY

## 2017-06-28 PROCEDURE — 05PY33Z REMOVAL OF INFUSION DEVICE FROM UPPER VEIN, PERCUTANEOUS APPROACH: ICD-10-PCS | Performed by: RADIOLOGY

## 2017-06-28 PROCEDURE — 77001 FLUOROGUIDE FOR VEIN DEVICE: CPT

## 2017-06-28 PROCEDURE — 05HM33Z INSERTION OF INFUSION DEVICE INTO RIGHT INTERNAL JUGULAR VEIN, PERCUTANEOUS APPROACH: ICD-10-PCS | Performed by: RADIOLOGY

## 2017-06-28 PROCEDURE — 99152 MOD SED SAME PHYS/QHP 5/>YRS: CPT

## 2017-06-28 PROCEDURE — 36581 REPLACE TUNNELED CV CATH: CPT

## 2017-06-28 RX ORDER — HEPARIN SODIUM 1000 [USP'U]/ML
INJECTION, SOLUTION INTRAVENOUS; SUBCUTANEOUS
Status: COMPLETED
Start: 2017-06-28 | End: 2017-06-28

## 2017-06-28 RX ORDER — SODIUM CHLORIDE 9 MG/ML
INJECTION, SOLUTION INTRAVENOUS
Status: COMPLETED
Start: 2017-06-28 | End: 2017-06-28

## 2017-06-28 RX ORDER — MIDAZOLAM HYDROCHLORIDE 1 MG/ML
INJECTION INTRAMUSCULAR; INTRAVENOUS
Status: COMPLETED
Start: 2017-06-28 | End: 2017-06-28

## 2017-06-28 RX ORDER — HEPARIN SODIUM (PORCINE) LOCK FLUSH IV SOLN 100 UNIT/ML 100 UNIT/ML
1.5 SOLUTION INTRAVENOUS ONCE
Status: DISCONTINUED | OUTPATIENT
Start: 2017-06-28 | End: 2017-06-28

## 2017-06-28 RX ORDER — LIDOCAINE HYDROCHLORIDE 20 MG/ML
INJECTION, SOLUTION EPIDURAL; INFILTRATION; INTRACAUDAL; PERINEURAL
Status: COMPLETED
Start: 2017-06-28 | End: 2017-06-28

## 2017-06-28 RX ADMIN — SODIUM CHLORIDE: 9 INJECTION, SOLUTION INTRAVENOUS at 12:35:00

## 2017-07-17 ENCOUNTER — APPOINTMENT (OUTPATIENT)
Dept: GENERAL RADIOLOGY | Facility: HOSPITAL | Age: 62
DRG: 314 | End: 2017-07-17
Attending: EMERGENCY MEDICINE
Payer: COMMERCIAL

## 2017-07-17 ENCOUNTER — HOSPITAL ENCOUNTER (EMERGENCY)
Facility: HOSPITAL | Age: 62
Discharge: HOME | End: 2017-07-17

## 2017-07-17 ENCOUNTER — HOSPITAL ENCOUNTER (INPATIENT)
Facility: HOSPITAL | Age: 62
LOS: 5 days | Discharge: HOME | DRG: 314 | End: 2017-07-22
Attending: EMERGENCY MEDICINE | Admitting: INTERNAL MEDICINE
Payer: COMMERCIAL

## 2017-07-17 DIAGNOSIS — Z79.4 TYPE 2 DIABETES MELLITUS WITHOUT COMPLICATION, WITH LONG-TERM CURRENT USE OF INSULIN (HCC): ICD-10-CM

## 2017-07-17 DIAGNOSIS — N18.6 ESRD (END STAGE RENAL DISEASE) (HCC): ICD-10-CM

## 2017-07-17 DIAGNOSIS — C90.00 MULTIPLE MYELOMA, REMISSION STATUS UNSPECIFIED (HCC): ICD-10-CM

## 2017-07-17 DIAGNOSIS — R50.81 FEVER IN OTHER DISEASES: Primary | ICD-10-CM

## 2017-07-17 DIAGNOSIS — E11.9 TYPE 2 DIABETES MELLITUS WITHOUT COMPLICATION, WITH LONG-TERM CURRENT USE OF INSULIN (HCC): ICD-10-CM

## 2017-07-17 LAB
ANION GAP SERPL CALC-SCNC: 9 MMOL/L (ref 0–18)
APTT PPP: 34.3 SECONDS (ref 23.2–35.3)
BASOPHILS # BLD: 0.1 K/UL (ref 0–0.2)
BASOPHILS NFR BLD: 1 %
BILIRUB UR QL: NEGATIVE
BUN SERPL-MCNC: 20 MG/DL (ref 8–20)
BUN/CREAT SERPL: 4.8 (ref 10–20)
CALCIUM SERPL-MCNC: 8.3 MG/DL (ref 8.5–10.5)
CHLORIDE SERPL-SCNC: 99 MMOL/L (ref 95–110)
CO2 SERPL-SCNC: 27 MMOL/L (ref 22–32)
COLOR UR: YELLOW
CREAT SERPL-MCNC: 4.17 MG/DL (ref 0.5–1.5)
EOSINOPHIL # BLD: 0.1 K/UL (ref 0–0.7)
EOSINOPHIL NFR BLD: 1 %
ERYTHROCYTE [DISTWIDTH] IN BLOOD BY AUTOMATED COUNT: 17.5 % (ref 11–15)
GLUCOSE BLDC GLUCOMTR-MCNC: 232 MG/DL (ref 70–99)
GLUCOSE BLDC GLUCOMTR-MCNC: 252 MG/DL (ref 70–99)
GLUCOSE SERPL-MCNC: 276 MG/DL (ref 70–99)
GLUCOSE UR-MCNC: NEGATIVE MG/DL
HCT VFR BLD AUTO: 33.7 % (ref 35–48)
HGB BLD-MCNC: 10.8 G/DL (ref 12–16)
INR BLD: 1.1 (ref 0.9–1.2)
KETONES UR-MCNC: NEGATIVE MG/DL
LACTATE SERPL-SCNC: 1.9 MMOL/L (ref 0.5–2.2)
LACTATE SERPL-SCNC: 3.2 MMOL/L (ref 0.5–2.2)
LYMPHOCYTES # BLD: 1.4 K/UL (ref 1–4)
LYMPHOCYTES NFR BLD: 15 %
MCH RBC QN AUTO: 32.9 PG (ref 27–32)
MCHC RBC AUTO-ENTMCNC: 32 G/DL (ref 32–37)
MCV RBC AUTO: 102.9 FL (ref 80–100)
MONOCYTES # BLD: 0.7 K/UL (ref 0–1)
MONOCYTES NFR BLD: 8 %
MRSA DNA SPEC QL NAA+PROBE: NEGATIVE
NEUTROPHILS # BLD AUTO: 7.5 K/UL (ref 1.8–7.7)
NEUTROPHILS NFR BLD: 76 %
NITRITE UR QL STRIP.AUTO: NEGATIVE
OSMOLALITY UR CALC.SUM OF ELEC: 292 MOSM/KG (ref 275–295)
PH UR: 6 [PH] (ref 5–8)
PLATELET # BLD AUTO: 159 K/UL (ref 140–400)
PMV BLD AUTO: 9.3 FL (ref 7.4–10.3)
POTASSIUM SERPL-SCNC: 4.5 MMOL/L (ref 3.3–5.1)
PROT UR-MCNC: 100 MG/DL
PROTHROMBIN TIME: 14 SECONDS (ref 11.8–14.5)
RBC # BLD AUTO: 3.28 M/UL (ref 3.7–5.4)
RBC #/AREA URNS AUTO: 2 /HPF
SODIUM SERPL-SCNC: 135 MMOL/L (ref 136–144)
SP GR UR STRIP: 1.02 (ref 1–1.03)
UROBILINOGEN UR STRIP-ACNC: <2
VIT C UR-MCNC: NEGATIVE MG/DL
WBC # BLD AUTO: 9.9 K/UL (ref 4–11)
WBC #/AREA URNS AUTO: 6 /HPF

## 2017-07-17 PROCEDURE — 87040 BLOOD CULTURE FOR BACTERIA: CPT | Performed by: EMERGENCY MEDICINE

## 2017-07-17 PROCEDURE — 85610 PROTHROMBIN TIME: CPT | Performed by: EMERGENCY MEDICINE

## 2017-07-17 PROCEDURE — 84165 PROTEIN E-PHORESIS SERUM: CPT | Performed by: INTERNAL MEDICINE

## 2017-07-17 PROCEDURE — 87086 URINE CULTURE/COLONY COUNT: CPT | Performed by: EMERGENCY MEDICINE

## 2017-07-17 PROCEDURE — 87641 MR-STAPH DNA AMP PROBE: CPT | Performed by: EMERGENCY MEDICINE

## 2017-07-17 PROCEDURE — 99285 EMERGENCY DEPT VISIT HI MDM: CPT

## 2017-07-17 PROCEDURE — 71010 XR CHEST AP PORTABLE  (CPT=71010): CPT | Performed by: EMERGENCY MEDICINE

## 2017-07-17 PROCEDURE — 36415 COLL VENOUS BLD VENIPUNCTURE: CPT

## 2017-07-17 PROCEDURE — 83605 ASSAY OF LACTIC ACID: CPT

## 2017-07-17 PROCEDURE — 80048 BASIC METABOLIC PNL TOTAL CA: CPT

## 2017-07-17 PROCEDURE — 96365 THER/PROPH/DIAG IV INF INIT: CPT

## 2017-07-17 PROCEDURE — 82962 GLUCOSE BLOOD TEST: CPT

## 2017-07-17 PROCEDURE — 83883 ASSAY NEPHELOMETRY NOT SPEC: CPT | Performed by: INTERNAL MEDICINE

## 2017-07-17 PROCEDURE — 85025 COMPLETE CBC W/AUTO DIFF WBC: CPT

## 2017-07-17 PROCEDURE — 86334 IMMUNOFIX E-PHORESIS SERUM: CPT | Performed by: INTERNAL MEDICINE

## 2017-07-17 PROCEDURE — 87077 CULTURE AEROBIC IDENTIFY: CPT | Performed by: EMERGENCY MEDICINE

## 2017-07-17 PROCEDURE — 85730 THROMBOPLASTIN TIME PARTIAL: CPT | Performed by: EMERGENCY MEDICINE

## 2017-07-17 PROCEDURE — 81001 URINALYSIS AUTO W/SCOPE: CPT | Performed by: EMERGENCY MEDICINE

## 2017-07-17 PROCEDURE — 87186 SC STD MICRODIL/AGAR DIL: CPT | Performed by: EMERGENCY MEDICINE

## 2017-07-17 RX ORDER — SODIUM CHLORIDE 0.9 % (FLUSH) 0.9 %
3 SYRINGE (ML) INJECTION AS NEEDED
Status: DISCONTINUED | OUTPATIENT
Start: 2017-07-17 | End: 2017-07-22

## 2017-07-17 RX ORDER — ACETAMINOPHEN 325 MG/1
650 TABLET ORAL ONCE
Status: COMPLETED | OUTPATIENT
Start: 2017-07-17 | End: 2017-07-17

## 2017-07-17 RX ORDER — LORAZEPAM 1 MG/1
1 TABLET ORAL EVERY 4 HOURS PRN
Status: DISCONTINUED | OUTPATIENT
Start: 2017-07-17 | End: 2017-07-22

## 2017-07-17 RX ORDER — SODIUM CHLORIDE 9 MG/ML
INJECTION, SOLUTION INTRAVENOUS
Status: DISPENSED
Start: 2017-07-17 | End: 2017-07-18

## 2017-07-17 RX ORDER — HEPARIN SODIUM 5000 [USP'U]/ML
5000 INJECTION, SOLUTION INTRAVENOUS; SUBCUTANEOUS EVERY 12 HOURS SCHEDULED
Status: DISCONTINUED | OUTPATIENT
Start: 2017-07-17 | End: 2017-07-22

## 2017-07-17 RX ORDER — ESCITALOPRAM OXALATE 10 MG/1
10 TABLET ORAL DAILY
Status: DISCONTINUED | OUTPATIENT
Start: 2017-07-17 | End: 2017-07-22

## 2017-07-17 RX ORDER — ACETAMINOPHEN 325 MG/1
650 TABLET ORAL EVERY 6 HOURS PRN
Status: DISCONTINUED | OUTPATIENT
Start: 2017-07-17 | End: 2017-07-22

## 2017-07-17 RX ORDER — DEXAMETHASONE 2 MG/1
4 TABLET ORAL DAILY
Status: DISCONTINUED | OUTPATIENT
Start: 2017-07-17 | End: 2017-07-19

## 2017-07-17 NOTE — ED INITIAL ASSESSMENT (HPI)
Sent from her pmd office for poss infection at the diaylsis port. Pt has had a fever and cough for the past 2 weeks. Pt last chemo was 3 weeks ago.  Pt had cbc in office and her cbc was 11.5 and 10.8 hmgb

## 2017-07-17 NOTE — ED PROVIDER NOTES
Patient Seen in: [facility] AND [facility] Emergency Department    History   Patient presents with:  Cellulitis (integumentary, infectious)    Stated Complaint:     HPI    Patient with history of multiple myeloma recently diagnosed currently receiving chemothera sugar four times daily   insulin aspart (NOVOLOG FLEXPEN) 100 UNIT/ML Subcutaneous Solution Pen-injector,  Inject 10 Units into the skin 3 (three) times daily before meals.    Glucose Blood (ACCU-CHEK DEBORAH PLUS) In Vitro Strip,  1 each by Other route daily auscultation bilaterally with good effort. No wheezes, ronchi, or rales. Abdomen:  Soft, non-tender, non-distended. No masses, no hepato-splenomegaly. Negative McBurney's point tenderness. Musculoskeletal:  Good muscle tone.   Dialysis catheter in righ Glucose  252 (*)     All other components within normal limits   CBC W/ DIFFERENTIAL - Abnormal; Notable for the following:     RBC 3.28 (*)     HGB 10.8 (*)     HCT 33.7 (*)     .9 (*)     MCH 32.9 (*)     RDW 17.5 (*)     All other components with disposition on file for this visit. Follow-up:  No follow-up provider specified.     Medications Prescribed:  Current Discharge Medication List

## 2017-07-17 NOTE — H&P
[facility]    History and Physical    Chandra Essex Patient Status:  Emergency    3/7/1955 MRN [id number]   Location [address] Attending Trent Felipe MD   Hosp Day # 0 PCP Valeria Teran MD Alcohol use: No                Allergies/Medications:    Allergies:   Barium                    Iodine (Topical)          Radiology Contrast *      Seasonal                  Sulfa Antibiotics         Tetanus Toxoid            V Dialysis catheter right-sided chest appears clear without any erythema or warmth. There is no drainage noted. Abdominal: Soft. There is no tenderness. Musculoskeletal: She exhibits no tenderness.    Lymphadenopathy:     She has no cervical adenopathy chemotherapy regimen will be placed on hold. Oncology has been notified. Patient will also continue with her regular 3 times weekly dialysis. Nephrology has been notified. We'll monitor Accu-Cheks while patient is inpatient.   Continue with current

## 2017-07-17 NOTE — PROGRESS NOTES
Therapeutic Interchange insulin glargine (BASAGLAR) 100 UNIT/ML injection 24 Units wit insulin detemir (LEVEMIR) 100 UNIT/ML flextouch 24 Units    Gaby NEGRON,Ph.  [id number]  7/17/2017

## 2017-07-17 NOTE — ED NOTES
Dr. Paz Adame at bedside. Patient ambulatory to bathroom with daughter with severe dyspnea.  90% on RA, placed on 3L

## 2017-07-18 PROBLEM — R05.9 COUGH: Status: ACTIVE | Noted: 2017-07-18

## 2017-07-18 LAB
ANION GAP SERPL CALC-SCNC: 7 MMOL/L (ref 0–18)
BASOPHILS # BLD: 0 K/UL (ref 0–0.2)
BASOPHILS NFR BLD: 0 %
BUN SERPL-MCNC: 29 MG/DL (ref 8–20)
BUN/CREAT SERPL: 6.4 (ref 10–20)
CALCIUM SERPL-MCNC: 8.2 MG/DL (ref 8.5–10.5)
CHLORIDE SERPL-SCNC: 101 MMOL/L (ref 95–110)
CO2 SERPL-SCNC: 29 MMOL/L (ref 22–32)
CREAT SERPL-MCNC: 4.56 MG/DL (ref 0.5–1.5)
EOSINOPHIL # BLD: 0 K/UL (ref 0–0.7)
EOSINOPHIL NFR BLD: 0 %
ERYTHROCYTE [DISTWIDTH] IN BLOOD BY AUTOMATED COUNT: 17.2 % (ref 11–15)
GLUCOSE BLDC GLUCOMTR-MCNC: 125 MG/DL (ref 70–99)
GLUCOSE BLDC GLUCOMTR-MCNC: 198 MG/DL (ref 70–99)
GLUCOSE BLDC GLUCOMTR-MCNC: 81 MG/DL (ref 70–99)
GLUCOSE BLDC GLUCOMTR-MCNC: 91 MG/DL (ref 70–99)
GLUCOSE SERPL-MCNC: 108 MG/DL (ref 70–99)
HCT VFR BLD AUTO: 30.8 % (ref 35–48)
HGB BLD-MCNC: 10 G/DL (ref 12–16)
LYMPHOCYTES # BLD: 0.9 K/UL (ref 1–4)
LYMPHOCYTES NFR BLD: 8 %
MCH RBC QN AUTO: 33.1 PG (ref 27–32)
MCHC RBC AUTO-ENTMCNC: 32.6 G/DL (ref 32–37)
MCV RBC AUTO: 101.6 FL (ref 80–100)
METAMYELOCYTES # BLD MANUAL: 0.11 K/UL
METAMYELOCYTES NFR BLD: 1 %
MONOCYTES # BLD: 1.6 K/UL (ref 0–1)
MONOCYTES NFR BLD: 15 %
NEUTROPHILS # BLD AUTO: 8.1 K/UL (ref 1.8–7.7)
NEUTROPHILS NFR BLD: 70 %
NEUTS BAND NFR BLD: 6 %
OSMOLALITY UR CALC.SUM OF ELEC: 290 MOSM/KG (ref 275–295)
PLATELET # BLD AUTO: 136 K/UL (ref 140–400)
PMV BLD AUTO: 8.5 FL (ref 7.4–10.3)
POTASSIUM SERPL-SCNC: 4.6 MMOL/L (ref 3.3–5.1)
RBC # BLD AUTO: 3.03 M/UL (ref 3.7–5.4)
SODIUM SERPL-SCNC: 137 MMOL/L (ref 136–144)
WBC # BLD AUTO: 10.7 K/UL (ref 4–11)

## 2017-07-18 PROCEDURE — 85025 COMPLETE CBC W/AUTO DIFF WBC: CPT | Performed by: INTERNAL MEDICINE

## 2017-07-18 PROCEDURE — 90935 HEMODIALYSIS ONE EVALUATION: CPT

## 2017-07-18 PROCEDURE — 80048 BASIC METABOLIC PNL TOTAL CA: CPT | Performed by: INTERNAL MEDICINE

## 2017-07-18 PROCEDURE — 82962 GLUCOSE BLOOD TEST: CPT

## 2017-07-18 PROCEDURE — 87040 BLOOD CULTURE FOR BACTERIA: CPT | Performed by: INTERNAL MEDICINE

## 2017-07-18 RX ORDER — GUAIFENESIN 100 MG/5ML
100 SOLUTION ORAL EVERY 4 HOURS PRN
Status: DISCONTINUED | OUTPATIENT
Start: 2017-07-18 | End: 2017-07-22

## 2017-07-18 NOTE — CONSULTS
[facility] [place] AND [facility] Infectious Disease  Report of Consultation    Danica Lopez Patient Status:  Inpatient    3/7/1955 MRN [id number]   Location [facility] 2W/SW Attending Pillo Macario MD   Hosp Day # 1 PCP Lin Bahena MD reports that she has never smoked. She does not have any smokeless tobacco history on file. She reports that she does not drink alcohol or use drugs.     Allergies:    Barium                    Iodine (Topical)          Radiology Contrast *      Season Negative for easy bruising, bleeding, and lymphadenopathy. Musculoskeletal: Negative for myalgias, arthralgias, muscle weakness. Neurological: No focal neurologic deficits, seizures, tremors. Psych:  No h/o anxiety, depression, other psych d/o.    End 07/18/2017   K 4.6 07/18/2017    07/18/2017   CO2 29 07/18/2017    07/18/2017   CA 8.2 07/18/2017        Cultures:   Recent pseudomonas  Current cultures 7/17 positive for GNRs  7/18 cultures negative thus far    Radiology:  CXR no infiltrates

## 2017-07-18 NOTE — DIETARY NOTE
ADULT NUTRITION INITIAL ASSESSMENT    Pt is at moderate nutrition risk. Pt does not meet malnutrition criteria. RECOMMENDATIONS TO MD:  CPM. RE may add Nepro if po intake is <2/3-3/4 of needs. Will monitor po intake adequacy.  Resume diabetic diet ( (end stage renal disease) ([facility] [N18.6]  Type 2 diabetes mellitus without complication, with long-term current use of insulin (HCC) [E11.9, Z79.4]  Multiple myeloma, remission status unspecified (HCC) [C90.00]  Fever in other diseases [R50.81]      PERTINE (355 lb)       GASTROINTESTINAL PROBLEMS: none. States there is a medication that takes at home after chemo that causes some stomach upset. FOOD/NUTRITION RELATED HISTORY:  Appetite: Poor  Today . Intake: 75 % breakfast, 0 intake lunch.  Had some rigors

## 2017-07-18 NOTE — PROGRESS NOTES
[facility]    Progress Note    Darcie Barrett Patient Status:  Inpatient    3/7/1955 MRN [id number]   Location [facility] 2W/SW Attending Tawnya North MD   Hosp Day # 1 PCP Moreno Martinez MD     Subjective:     Jaqueline Rock remission status unspecified ([facility]    Type 2 diabetes mellitus without complication, with long-term current use of insulin (HCC)    Cough    Symptomatically improved, fever resolved  Blood cultures growing gram neg bacilli--final ID pending. ?  Urinary sonido

## 2017-07-18 NOTE — CONSULTS
[facility] AND CLINICS  Report of Consultation    Monicatracy Marie Patient Status:  Inpatient    3/7/1955 MRN [id number]   Location [facility] 2W/SW Attending Funmilayo Laureano MD   Hosp Day # 1 PCP Destin Morton MD     Reason for [facility] file. She reports that she does not drink alcohol or use drugs.     Allergies:    Barium                    Iodine (Topical)          Radiology Contrast *      Seasonal                  Sulfa Antibiotics         Tetanus Toxoid            Vancomycin Results  Component Value Date   WBC 10.7 07/18/2017   HGB 10.0 07/18/2017   HCT 30.8 07/18/2017    07/18/2017   CREATSERUM 4.56 07/18/2017   BUN 29 07/18/2017    07/18/2017   K 4.6 07/18/2017    07/18/2017   CO2 29 07/18/2017    0

## 2017-07-18 NOTE — CONSULTS
[facility]    Progress Note    Glorious Epley Patient Status:  Inpatient    3/7/1955 MRN [id number]   Location [facility] 2W/SW Attending Marium Gilliam MD   Hosp Day # 1 PCP Katrina Navas MD       Subjective:   Blanche Kearns 4.17*  4.17*  4.56*   GFRAA   --   13*  12*   GFRNAA   --   11*  10*   CA   --   8.3*  8.2*   NA  136  135*  137   K  4.3  4.5  4.6   CL  101  99  101   CO2  28.8  27  29          Xr Chest Ap Portable  (cpt=71010)    Result Date: 7/17/2017  CONCLUSION:  1.

## 2017-07-19 ENCOUNTER — APPOINTMENT (OUTPATIENT)
Dept: INTERVENTIONAL RADIOLOGY/VASCULAR | Facility: HOSPITAL | Age: 62
DRG: 314 | End: 2017-07-19
Attending: INTERNAL MEDICINE
Payer: COMMERCIAL

## 2017-07-19 ENCOUNTER — APPOINTMENT (OUTPATIENT)
Dept: CV DIAGNOSTICS | Facility: HOSPITAL | Age: 62
DRG: 314 | End: 2017-07-19
Attending: INTERNAL MEDICINE
Payer: COMMERCIAL

## 2017-07-19 LAB
ALBUMIN SERPL BCP-MCNC: 2.3 G/DL (ref 3.5–4.8)
ANION GAP SERPL CALC-SCNC: 7 MMOL/L (ref 0–18)
BASOPHILS # BLD: 0 K/UL (ref 0–0.2)
BASOPHILS NFR BLD: 0 %
BUN SERPL-MCNC: 19 MG/DL (ref 8–20)
BUN/CREAT SERPL: 5.5 (ref 10–20)
CALCIUM SERPL-MCNC: 8.3 MG/DL (ref 8.5–10.5)
CHLORIDE SERPL-SCNC: 100 MMOL/L (ref 95–110)
CO2 SERPL-SCNC: 30 MMOL/L (ref 22–32)
CREAT SERPL-MCNC: 3.43 MG/DL (ref 0.5–1.5)
EOSINOPHIL # BLD: 0.2 K/UL (ref 0–0.7)
EOSINOPHIL NFR BLD: 2 %
ERYTHROCYTE [DISTWIDTH] IN BLOOD BY AUTOMATED COUNT: 16.9 % (ref 11–15)
GLUCOSE BLDC GLUCOMTR-MCNC: 127 MG/DL (ref 70–99)
GLUCOSE BLDC GLUCOMTR-MCNC: 129 MG/DL (ref 70–99)
GLUCOSE BLDC GLUCOMTR-MCNC: 153 MG/DL (ref 70–99)
GLUCOSE BLDC GLUCOMTR-MCNC: 97 MG/DL (ref 70–99)
GLUCOSE SERPL-MCNC: 170 MG/DL (ref 70–99)
HCT VFR BLD AUTO: 29.2 % (ref 35–48)
HGB BLD-MCNC: 9.5 G/DL (ref 12–16)
LYMPHOCYTES # BLD: 0.9 K/UL (ref 1–4)
LYMPHOCYTES NFR BLD: 8 %
MCH RBC QN AUTO: 33 PG (ref 27–32)
MCHC RBC AUTO-ENTMCNC: 32.5 G/DL (ref 32–37)
MCV RBC AUTO: 101.6 FL (ref 80–100)
MONOCYTES # BLD: 0.8 K/UL (ref 0–1)
MONOCYTES NFR BLD: 7 %
NEUTROPHILS # BLD AUTO: 9.1 K/UL (ref 1.8–7.7)
NEUTROPHILS NFR BLD: 82 %
NEUTS BAND NFR BLD: 1 %
OSMOLALITY UR CALC.SUM OF ELEC: 290 MOSM/KG (ref 275–295)
PHOSPHATE SERPL-MCNC: 4.2 MG/DL (ref 2.4–4.7)
PLATELET # BLD AUTO: 114 K/UL (ref 140–400)
PMV BLD AUTO: 9.8 FL (ref 7.4–10.3)
POTASSIUM SERPL-SCNC: 4.3 MMOL/L (ref 3.3–5.1)
RBC # BLD AUTO: 2.88 M/UL (ref 3.7–5.4)
SODIUM SERPL-SCNC: 137 MMOL/L (ref 136–144)
WBC # BLD AUTO: 11 K/UL (ref 4–11)

## 2017-07-19 PROCEDURE — 02PY33Z REMOVAL OF INFUSION DEVICE FROM GREAT VESSEL, PERCUTANEOUS APPROACH: ICD-10-PCS | Performed by: RADIOLOGY

## 2017-07-19 PROCEDURE — 87070 CULTURE OTHR SPECIMN AEROBIC: CPT | Performed by: RADIOLOGY

## 2017-07-19 PROCEDURE — 82962 GLUCOSE BLOOD TEST: CPT

## 2017-07-19 PROCEDURE — 85025 COMPLETE CBC W/AUTO DIFF WBC: CPT | Performed by: INTERNAL MEDICINE

## 2017-07-19 PROCEDURE — 36589 REMOVAL TUNNELED CV CATH: CPT

## 2017-07-19 PROCEDURE — 93306 TTE W/DOPPLER COMPLETE: CPT | Performed by: INTERNAL MEDICINE

## 2017-07-19 PROCEDURE — 80069 RENAL FUNCTION PANEL: CPT | Performed by: INTERNAL MEDICINE

## 2017-07-19 RX ORDER — SODIUM CHLORIDE 9 MG/ML
INJECTION, SOLUTION INTRAVENOUS
Status: COMPLETED
Start: 2017-07-19 | End: 2017-07-19

## 2017-07-19 NOTE — PROGRESS NOTES
[facility]    Progress Note    Darcie Barrett Patient Status:  Inpatient    3/7/1955 MRN [id number]   Location [facility] 2W/SW Attending Tawnya North MD   Hosp Day # 2 PCP Moreno Martinez MD       Subjective:   Iram Ames (cpt=71010)    Result Date: 7/17/2017  CONCLUSION:  1. Patient rotation towards left limits evaluation. No evidence for pneumonia. 2. Atherosclerotic calcification aorta. 3. Right-sided double-lumen catheter with tip in SVC.                Rosa Roque MD

## 2017-07-19 NOTE — PLAN OF CARE
CARDIOVASCULAR - ADULT    • Maintains optimal cardiac output and hemodynamic stability Progressing    •   No ectopy at this time, denies chest pain/denies shortness of breath Progressing        Diabetes/Glucose Control    • Glucose maintained within prescr

## 2017-07-19 NOTE — PROGRESS NOTES
[facility]    Progress Note    Jonh Crump Patient Status:  Inpatient    3/7/1955 MRN [id number]   Location [facility] 2W/SW Attending Stephy Denis MD   Hosp Day # 2 PCP Lorena Mcmullen MD     Subjective:     Darlin Condon complication ([facility] [address] [facility]    HTN (hypertension)    ESRD (end stage renal disease) ([facility]    Lymphedema    Multiple myeloma, remission status unspecified (HCC)    Type 2 diabetes mellitus without complication, with long-term current use of insulin (HCC)    Cough

## 2017-07-19 NOTE — PROGRESS NOTES
[facility] AND [facility] Infectious Disease  Progress Note    Glorious Epley Patient Status:  Inpatient    3/7/1955 MRN [id number]   Location [facility] 2W/SW Attending Marium Gilliam MD   Hosp Day # 2 PCP Katrina Navas MD     Subject pseudomonas once again  - Source likely related to infected HD catheter vs. Other  - Just completed IV fortaz course with line exchange (placed in the same location ? residual tunnel infection)  - Now with recurrent fevers, + blood cultures once again  - Co

## 2017-07-19 NOTE — PROCEDURES
[facility]  Procedure Note    Jesus Essex Patient Status:  Inpatient    3/7/1955 MRN [id number]   Location [facility] 4W/SW/SE Attending Kendall Dandy, MD   Hosp Day # 2 PCP Valeria Teran MD     Procedure:  Leif Flores

## 2017-07-20 LAB
ALBUMIN SERPL BCP-MCNC: 2.3 G/DL (ref 3.5–4.8)
ANION GAP SERPL CALC-SCNC: 9 MMOL/L (ref 0–18)
BASOPHILS # BLD: 0.1 K/UL (ref 0–0.2)
BASOPHILS NFR BLD: 2 %
BUN SERPL-MCNC: 30 MG/DL (ref 8–20)
BUN/CREAT SERPL: 7.9 (ref 10–20)
CALCIUM SERPL-MCNC: 8.3 MG/DL (ref 8.5–10.5)
CHLORIDE SERPL-SCNC: 99 MMOL/L (ref 95–110)
CO2 SERPL-SCNC: 28 MMOL/L (ref 22–32)
CREAT SERPL-MCNC: 3.81 MG/DL (ref 0.5–1.5)
EOSINOPHIL # BLD: 0.2 K/UL (ref 0–0.7)
EOSINOPHIL NFR BLD: 3 %
ERYTHROCYTE [DISTWIDTH] IN BLOOD BY AUTOMATED COUNT: 16.4 % (ref 11–15)
GLUCOSE BLDC GLUCOMTR-MCNC: 114 MG/DL (ref 70–99)
GLUCOSE BLDC GLUCOMTR-MCNC: 172 MG/DL (ref 70–99)
GLUCOSE BLDC GLUCOMTR-MCNC: 206 MG/DL (ref 70–99)
GLUCOSE BLDC GLUCOMTR-MCNC: 92 MG/DL (ref 70–99)
GLUCOSE SERPL-MCNC: 156 MG/DL (ref 70–99)
HCT VFR BLD AUTO: 28.2 % (ref 35–48)
HGB BLD-MCNC: 9.3 G/DL (ref 12–16)
LYMPHOCYTES # BLD: 1.4 K/UL (ref 1–4)
LYMPHOCYTES NFR BLD: 17 %
MCH RBC QN AUTO: 32.6 PG (ref 27–32)
MCHC RBC AUTO-ENTMCNC: 33 G/DL (ref 32–37)
MCV RBC AUTO: 98.7 FL (ref 80–100)
MONOCYTES # BLD: 1.3 K/UL (ref 0–1)
MONOCYTES NFR BLD: 16 %
NEUTROPHILS # BLD AUTO: 4.9 K/UL (ref 1.8–7.7)
NEUTROPHILS NFR BLD: 62 %
OSMOLALITY UR CALC.SUM OF ELEC: 291 MOSM/KG (ref 275–295)
PHOSPHATE SERPL-MCNC: 4.4 MG/DL (ref 2.4–4.7)
PLATELET # BLD AUTO: 120 K/UL (ref 140–400)
PMV BLD AUTO: 9.9 FL (ref 7.4–10.3)
POTASSIUM SERPL-SCNC: 3.8 MMOL/L (ref 3.3–5.1)
RBC # BLD AUTO: 2.85 M/UL (ref 3.7–5.4)
SODIUM SERPL-SCNC: 136 MMOL/L (ref 136–144)
WBC # BLD AUTO: 7.9 K/UL (ref 4–11)

## 2017-07-20 PROCEDURE — 82962 GLUCOSE BLOOD TEST: CPT

## 2017-07-20 PROCEDURE — 85025 COMPLETE CBC W/AUTO DIFF WBC: CPT | Performed by: INTERNAL MEDICINE

## 2017-07-20 PROCEDURE — 80069 RENAL FUNCTION PANEL: CPT | Performed by: INTERNAL MEDICINE

## 2017-07-20 RX ORDER — ALBUMIN (HUMAN) 12.5 G/50ML
100 SOLUTION INTRAVENOUS
Status: DISCONTINUED | OUTPATIENT
Start: 2017-07-21 | End: 2017-07-22

## 2017-07-20 RX ORDER — HEPARIN SODIUM 1000 [USP'U]/ML
1.5 INJECTION, SOLUTION INTRAVENOUS; SUBCUTANEOUS
Status: DISCONTINUED | OUTPATIENT
Start: 2017-07-21 | End: 2017-07-22

## 2017-07-20 NOTE — PROGRESS NOTES
[facility]    Progress Note    Charo Mayer Patient Status:  Inpatient    3/7/1955 MRN [id number]   Location [facility] 4W/SW/SE Attending Deisy Vickers MD   Hosp Day # 3 PCP Perry Hyde MD     Subjective: Cough    Blood culture is growing Enterobacter. Echocardiogram showed no vegetations. Continue with current antibiotics per ID  Dialysis catheter has been removed.   Plan for insertion of new catheter in new site tomorrow  Holding off on dialysis until ne

## 2017-07-20 NOTE — PROGRESS NOTES
[facility] AND [facility] Infectious Disease Progress Note    Kelly Bonilla Patient Status:  Inpatient    3/7/1955 MRN [id number]   Location [facility] 4W/SW/SE Attending Lilibeth Baltazar MD   Hosp Day # 3 PCP MD Candido Allen not currently breastfeeding. Temp (24hrs), Av.8 °F (36.6 °C), Min:97.5 °F (36.4 °C), Max:98.3 °F (36.8 °C)      HEENT: Exam is unremarkable. Without scleral icterus. Mucous membranes are moist. PERRLA. Oropharynx is clear.   Neck: No tenderness to Chanda Romero and Dr. Halina Cleaning  -IR called ([facility]) will coordinate access for both HD and central access for IV abx    If you have any questions or concerns please call [facility] at [phone].      KOKO BAUER NP  7/20/2017  11:12 AM

## 2017-07-21 ENCOUNTER — APPOINTMENT (OUTPATIENT)
Dept: INTERVENTIONAL RADIOLOGY/VASCULAR | Facility: HOSPITAL | Age: 62
DRG: 314 | End: 2017-07-21
Attending: INTERNAL MEDICINE
Payer: COMMERCIAL

## 2017-07-21 LAB
ALBUMIN SERPL BCP-MCNC: 2.4 G/DL (ref 3.5–4.8)
ANION GAP SERPL CALC-SCNC: 8 MMOL/L (ref 0–18)
BASOPHILS # BLD: 0.2 K/UL (ref 0–0.2)
BASOPHILS NFR BLD: 2 %
BUN SERPL-MCNC: 32 MG/DL (ref 8–20)
BUN/CREAT SERPL: 7.9 (ref 10–20)
CALCIUM SERPL-MCNC: 8.6 MG/DL (ref 8.5–10.5)
CHLORIDE SERPL-SCNC: 103 MMOL/L (ref 95–110)
CO2 SERPL-SCNC: 28 MMOL/L (ref 22–32)
CREAT SERPL-MCNC: 4.05 MG/DL (ref 0.5–1.5)
EOSINOPHIL # BLD: 0.2 K/UL (ref 0–0.7)
EOSINOPHIL NFR BLD: 2 %
ERYTHROCYTE [DISTWIDTH] IN BLOOD BY AUTOMATED COUNT: 16.5 % (ref 11–15)
GLUCOSE BLDC GLUCOMTR-MCNC: 110 MG/DL (ref 70–99)
GLUCOSE BLDC GLUCOMTR-MCNC: 193 MG/DL (ref 70–99)
GLUCOSE BLDC GLUCOMTR-MCNC: 90 MG/DL (ref 70–99)
GLUCOSE BLDC GLUCOMTR-MCNC: 92 MG/DL (ref 70–99)
GLUCOSE SERPL-MCNC: 115 MG/DL (ref 70–99)
HCT VFR BLD AUTO: 28.6 % (ref 35–48)
HGB BLD-MCNC: 9.3 G/DL (ref 12–16)
LYMPHOCYTES # BLD: 1.5 K/UL (ref 1–4)
LYMPHOCYTES NFR BLD: 17 %
MCH RBC QN AUTO: 32.5 PG (ref 27–32)
MCHC RBC AUTO-ENTMCNC: 32.6 G/DL (ref 32–37)
MCV RBC AUTO: 99.9 FL (ref 80–100)
MONOCYTES # BLD: 1.1 K/UL (ref 0–1)
MONOCYTES NFR BLD: 12 %
NEUTROPHILS # BLD AUTO: 6 K/UL (ref 1.8–7.7)
NEUTROPHILS NFR BLD: 67 %
OSMOLALITY UR CALC.SUM OF ELEC: 296 MOSM/KG (ref 275–295)
PHOSPHATE SERPL-MCNC: 4.5 MG/DL (ref 2.4–4.7)
PLATELET # BLD AUTO: 132 K/UL (ref 140–400)
PMV BLD AUTO: 10.2 FL (ref 7.4–10.3)
POTASSIUM SERPL-SCNC: 4 MMOL/L (ref 3.3–5.1)
RBC # BLD AUTO: 2.86 M/UL (ref 3.7–5.4)
SODIUM SERPL-SCNC: 139 MMOL/L (ref 136–144)
WBC # BLD AUTO: 8.9 K/UL (ref 4–11)

## 2017-07-21 PROCEDURE — 99153 MOD SED SAME PHYS/QHP EA: CPT

## 2017-07-21 PROCEDURE — 36558 INSERT TUNNELED CV CATH: CPT

## 2017-07-21 PROCEDURE — 77001 FLUOROGUIDE FOR VEIN DEVICE: CPT

## 2017-07-21 PROCEDURE — 82962 GLUCOSE BLOOD TEST: CPT

## 2017-07-21 PROCEDURE — 85025 COMPLETE CBC W/AUTO DIFF WBC: CPT | Performed by: INTERNAL MEDICINE

## 2017-07-21 PROCEDURE — 80069 RENAL FUNCTION PANEL: CPT | Performed by: INTERNAL MEDICINE

## 2017-07-21 PROCEDURE — 05HN33Z INSERTION OF INFUSION DEVICE INTO LEFT INTERNAL JUGULAR VEIN, PERCUTANEOUS APPROACH: ICD-10-PCS | Performed by: RADIOLOGY

## 2017-07-21 PROCEDURE — 99152 MOD SED SAME PHYS/QHP 5/>YRS: CPT

## 2017-07-21 PROCEDURE — 5A1D00Z PERFORMANCE OF URINARY FILTRATION, SINGLE: ICD-10-PCS | Performed by: INTERNAL MEDICINE

## 2017-07-21 PROCEDURE — 02H633Z INSERTION OF INFUSION DEVICE INTO RIGHT ATRIUM, PERCUTANEOUS APPROACH: ICD-10-PCS | Performed by: RADIOLOGY

## 2017-07-21 RX ORDER — HEPARIN SODIUM (PORCINE) LOCK FLUSH IV SOLN 100 UNIT/ML 100 UNIT/ML
SOLUTION INTRAVENOUS
Status: COMPLETED
Start: 2017-07-21 | End: 2017-07-21

## 2017-07-21 RX ORDER — CIPROFLOXACIN 500 MG/1
500 TABLET, FILM COATED ORAL
Qty: 7 TABLET | Refills: 0 | Status: SHIPPED | OUTPATIENT
Start: 2017-07-21 | End: 2017-08-07

## 2017-07-21 RX ORDER — SODIUM CHLORIDE 9 MG/ML
INJECTION, SOLUTION INTRAVENOUS
Status: COMPLETED
Start: 2017-07-21 | End: 2017-07-21

## 2017-07-21 RX ORDER — MIDAZOLAM HYDROCHLORIDE 1 MG/ML
INJECTION INTRAMUSCULAR; INTRAVENOUS
Status: COMPLETED
Start: 2017-07-21 | End: 2017-07-21

## 2017-07-21 RX ORDER — SODIUM CHLORIDE 9 MG/ML
INJECTION, SOLUTION INTRAVENOUS
Status: DISPENSED
Start: 2017-07-21 | End: 2017-07-22

## 2017-07-21 RX ORDER — HEPARIN SODIUM 1000 [USP'U]/ML
INJECTION, SOLUTION INTRAVENOUS; SUBCUTANEOUS
Status: COMPLETED
Start: 2017-07-21 | End: 2017-07-21

## 2017-07-21 RX ORDER — LIDOCAINE HYDROCHLORIDE 20 MG/ML
INJECTION, SOLUTION EPIDURAL; INFILTRATION; INTRACAUDAL; PERINEURAL
Status: COMPLETED
Start: 2017-07-21 | End: 2017-07-21

## 2017-07-21 NOTE — PROCEDURES
[facility]  Procedure Note    Charo Done Patient Status:  Inpatient    3/7/1955 MRN [id number]   Location [facility] Attending Deisy Vickers MD   Hosp Day # 4 PCP Perry Hyde MD     Proce

## 2017-07-21 NOTE — PROGRESS NOTES
[facility]    Progress Note    Alejandra Drilling Patient Status:  Inpatient    3/7/1955 MRN [id number]   Location [facility] 4W/SW/SE Attending Evelina Parker MD   Hosp Day # 4 PCP Maricruz Walsh MD       Subjective:   Namita Benton 04/24/2017   PHOS 4.5 07/21/2017                   Eugenia Caballero MD  7/21/2017

## 2017-07-21 NOTE — PROGRESS NOTES
[facility]    Progress Note    Sheryle Royals Patient Status:  Inpatient    3/7/1955 MRN [id number]   Location [facility] 4W/SW/SE Attending Marin Jade MD   Hosp Day # 4 PCP Ish Goldberg MD     Subjective: myeloma (HCC)    Type 2 diabetes mellitus without complication ([facility]    HTN (hypertension)    ESRD (end stage renal disease) ([facility]    Lymphedema    Multiple myeloma, remission status unspecified (HCC)    Type 2 diabetes mellitus without complication, with

## 2017-07-21 NOTE — PROGRESS NOTES
[facility] [place] [facility] Infectious Disease Progress Note    Charo Mayer Patient Status:  Inpatient    3/7/1955 MRN [id number]   Location [facility] 4W/SW/SE Attending Deisy Vickers MD   Hosp Day # 4 PCP MD Filippo Barrera [employer]. No apparent distress. Vital Signs:  Blood pressure 138/65, pulse 80, temperature (!) 97.2 °F (36.2 °C), temperature source Oral, resp. rate 18, height 5' 7\" (1.702 m), weight (!) 355 lb (161 kg), SpO2 97 %, not currently breastfeeding.    Te will try to give IV Fortaz during HD again,   - will also give PO Cipro,     2.  Active fevers, chills, rigors at present  - Due to recurrent GNR sepsis  - Check 2D echo given recurrent infection - Negative     3.  H/o multiple myeloma, DM, and ESRD on HD

## 2017-07-22 VITALS
TEMPERATURE: 97 F | RESPIRATION RATE: 20 BRPM | WEIGHT: 293 LBS | BODY MASS INDEX: 45.99 KG/M2 | HEIGHT: 67 IN | DIASTOLIC BLOOD PRESSURE: 66 MMHG | OXYGEN SATURATION: 94 % | HEART RATE: 72 BPM | SYSTOLIC BLOOD PRESSURE: 127 MMHG

## 2017-07-22 LAB
GLUCOSE BLDC GLUCOMTR-MCNC: 106 MG/DL (ref 70–99)
GLUCOSE BLDC GLUCOMTR-MCNC: 151 MG/DL (ref 70–99)

## 2017-07-22 PROCEDURE — 82962 GLUCOSE BLOOD TEST: CPT

## 2017-07-22 RX ORDER — CIPROFLOXACIN 500 MG/1
500 TABLET, FILM COATED ORAL
Qty: 8 TABLET | Refills: 0 | Status: SHIPPED | OUTPATIENT
Start: 2017-07-22 | End: 2017-08-07

## 2017-07-22 NOTE — DISCHARGE SUMMARY
[facility]    Discharge Summary    Ashley Hernández Patient Status:  Inpatient    3/7/1955 MRN [id number]   Location [facility] 4W/SW/SE Attending Tracie Frias MD   Hosp Day # 5 PCP Meli Nelson MD     Date of Adm line was removed. Patient was given 2 day period without line and then new dialysis catheter line and central venous access line were placed. Patient was placed on meropenem. However her insurance would not cover this as outpatient.   Therefore patient w 30 tablet  Refills:  3     Glucose Blood Strp  Commonly known as:  ACCU-CHEK DEBORAH PLUS      1 each by Other route daily.    Quantity:  100 strip  Refills:  3     Blood Glucose Test Strp      Test blood sugar four times daily   Quantity:  400 strip  Refills

## 2017-07-22 NOTE — DISCHARGE PLANNING
Plan is for discharge home today and will have IV abx during dialysis. The pt.  Goes to [facility] and prescription for IV abx have been faxed to [facility].      F: [address] [facility], [address] [facility]

## 2017-07-22 NOTE — PROGRESS NOTES
[facility]    Progress Note    Zarina Sees Patient Status:  Inpatient    3/7/1955 MRN [id number]   Location [facility] 4W/SW/SE Attending John Vaca MD   Hosp Day # 5 PCP Angel Barcenas MD     Subjective: with long-term current use of insulin (HCC)    Cough    Patient had placement of dialysis catheter and central venous catheter done yesterday. She received dialysis yesterday and it is functioning well. She remains afebrile on current antibiotics.   There

## 2017-07-22 NOTE — PROGRESS NOTES
[facility] AND [facility] Infectious Disease Progress Note    Krystyna Gutierrez Patient Status:  Inpatient    3/7/1955 MRN [id number]   Location [facility] 4W/SW/SE Attending Elizabeth Mark MD   Hosp Day # 5 PCP MD Carli Frankel source Axillary, resp. rate 20, height 5' 7\" (1.702 m), weight (!) 355 lb (161 kg), SpO2 94 %, not currently breastfeeding. Temp (24hrs), Av.8 °F (36.6 °C), Min:97.5 °F (36.4 °C), Max:98.3 °F (36.8 °C)      HEENT: Exam is unremarkable.   Without scle

## 2017-07-23 ENCOUNTER — TELEPHONE (OUTPATIENT)
Dept: CARDIOLOGY UNIT | Facility: HOSPITAL | Age: 62
End: 2017-07-23

## 2017-07-24 ENCOUNTER — TELEPHONE (OUTPATIENT)
Dept: CARDIOLOGY UNIT | Facility: HOSPITAL | Age: 62
End: 2017-07-24

## 2017-07-31 PROCEDURE — 86334 IMMUNOFIX E-PHORESIS SERUM: CPT | Performed by: INTERNAL MEDICINE

## 2017-07-31 PROCEDURE — 83883 ASSAY NEPHELOMETRY NOT SPEC: CPT | Performed by: INTERNAL MEDICINE

## 2017-07-31 PROCEDURE — 84165 PROTEIN E-PHORESIS SERUM: CPT | Performed by: INTERNAL MEDICINE

## 2017-08-03 NOTE — CONSULTS
[facility]    Report of Consultation    Heidi Vivas Patient Status:  Inpatient    3/7/1955 MRN [id number]   Location [facility] 4W/SW/SE Attending No att. providers found   Hosp Day # 5 PCP Annette Rudolph MD     Date 1.3 mg/m2 (Treatment Plan Recorded) Subcutaneous Once       No prescriptions prior to admission.     Allergies    Barium                    Iodine (Topical)          Radiology Contrast *      Seasonal                  Sulfa Antibiotics         Tetanus Toxoi

## 2017-08-07 PROBLEM — D72.828 ACUTE NEUTROPHILIA: Status: ACTIVE | Noted: 2017-08-07

## 2017-08-07 PROCEDURE — 84165 PROTEIN E-PHORESIS SERUM: CPT | Performed by: INTERNAL MEDICINE

## 2017-08-07 PROCEDURE — 83883 ASSAY NEPHELOMETRY NOT SPEC: CPT | Performed by: INTERNAL MEDICINE

## 2017-08-07 PROCEDURE — 87040 BLOOD CULTURE FOR BACTERIA: CPT | Performed by: PHYSICIAN ASSISTANT

## 2017-08-07 PROCEDURE — 86334 IMMUNOFIX E-PHORESIS SERUM: CPT | Performed by: INTERNAL MEDICINE

## 2017-08-14 RX ORDER — SODIUM CHLORIDE, SODIUM LACTATE, POTASSIUM CHLORIDE, CALCIUM CHLORIDE 600; 310; 30; 20 MG/100ML; MG/100ML; MG/100ML; MG/100ML
INJECTION, SOLUTION INTRAVENOUS CONTINUOUS
Status: DISCONTINUED | OUTPATIENT
Start: 2017-08-14 | End: 2017-08-14

## 2017-08-16 NOTE — PAYOR COMM NOTE
--------------  DISCHARGE REVIEW    Payor: NAVA SPARKS  Subscriber #:  [id number]  Authorization Number: [id number]    Admit date: 7/17/17  Admit time:  1813  Discharge Date: 7/22/2017  1:13 PM     Admitting Physician: Zia Harvey MD  Attending Physician Type 2 diabetes mellitus without complication, with long-term current use of insulin (HCC)     Anemia due to bone marrow failure, unspecified bone marrow failure type     Obesity, Class II, BMI 35-39.9, with comorbidity     Myeloma cast nephropathy ([facility] [address] [address] Instructions Prescription details   insulin glargine 100 UNIT/ML Sopn  Commonly known as:  Nikyk Adorno  What changed:  how much to take      Inject 28 Units into the skin every evening.    Quantity:  12 pen  Refills:  3        CONTINUE taking these 7/17/17  Admit time: 8156       Admitting Physician: Henny Haddad MD  Attending Physician:  No att. providers found  Primary Care Physician: Henny Haddad MD    REVIEW DOCUMENTATION:     ED Provider Notes      ED Provider Notes signed by Cecilia Woody escitalopram 10 MG Oral Tab,  Take 1 tablet (10 mg total) by mouth daily. LORazepam (ATIVAN) 1 MG Oral Tab,  Take 1 tablet (1 mg total) by mouth every 4 (four) hours as needed for Anxiety.    dexamethasone (DECADRON) 4 MG tablet,  Take 4 mg by mouth daily 1500]  O2 Device: None (Room air) [07/17/17 1500][MH.2]    Current:[MH.1]/82   Pulse 105   Temp 101.5 °F (38.6 °C) (Oral)   Resp 22   Ht 170.2 cm (5' 7\")   Wt (!) 161 kg   SpO2 90%   BMI 55.60 kg/m²[MH.2]         Physical Exam  Constitutional:  Rodrigue BUN/CREA Ratio 4.8 (*)     GFR, Non- 11 (*)     GFR, -American 13 (*)     All other components within normal limits   LACTIC ACID, PLASMA - Abnormal; Notable for the following:     Lactic Acid 3.2 (*)     All other components within -----------         ------                     ED/MRSA SCREEN BY PCR-CC[[id number]]     Normal              Final result                 Please view results for these tests on the individual orders.    BLOOD CULTURE   BLOOD CULTURE   URINE CULTURE, ROUT

## 2017-08-18 ENCOUNTER — HOSPITAL ENCOUNTER (OUTPATIENT)
Facility: HOSPITAL | Age: 62
Setting detail: OBSERVATION
Discharge: HOME | End: 2017-08-19
Attending: SURGERY | Admitting: SURGERY
Payer: COMMERCIAL

## 2017-08-18 ENCOUNTER — SURGERY (OUTPATIENT)
Age: 62
End: 2017-08-18

## 2017-08-18 ENCOUNTER — ANESTHESIA EVENT (OUTPATIENT)
Dept: SURGERY | Facility: HOSPITAL | Age: 62
End: 2017-08-18
Payer: COMMERCIAL

## 2017-08-18 ENCOUNTER — ANESTHESIA (OUTPATIENT)
Dept: SURGERY | Facility: HOSPITAL | Age: 62
End: 2017-08-18
Payer: COMMERCIAL

## 2017-08-18 DIAGNOSIS — N18.5 CHRONIC RENAL FAILURE, STAGE 5 (HCC): Primary | ICD-10-CM

## 2017-08-18 LAB
GLUCOSE BLDC GLUCOMTR-MCNC: 147 MG/DL (ref 70–99)
POTASSIUM SERPL-SCNC: 4.6 MMOL/L (ref 3.3–5.1)

## 2017-08-18 PROCEDURE — 84132 ASSAY OF SERUM POTASSIUM: CPT | Performed by: SURGERY

## 2017-08-18 PROCEDURE — 82962 GLUCOSE BLOOD TEST: CPT

## 2017-08-18 PROCEDURE — 05SF3ZZ REPOSITION LEFT CEPHALIC VEIN, PERCUTANEOUS APPROACH: ICD-10-PCS | Performed by: SURGERY

## 2017-08-18 RX ORDER — NALOXONE HYDROCHLORIDE 0.4 MG/ML
80 INJECTION, SOLUTION INTRAMUSCULAR; INTRAVENOUS; SUBCUTANEOUS AS NEEDED
Status: DISCONTINUED | OUTPATIENT
Start: 2017-08-18 | End: 2017-08-19

## 2017-08-18 RX ORDER — BUPIVACAINE HYDROCHLORIDE 2.5 MG/ML
INJECTION, SOLUTION EPIDURAL; INFILTRATION; INTRACAUDAL AS NEEDED
Status: DISCONTINUED | OUTPATIENT
Start: 2017-08-18 | End: 2017-08-19

## 2017-08-18 RX ORDER — ACETAMINOPHEN 325 MG/1
650 TABLET ORAL ONCE
Status: COMPLETED | OUTPATIENT
Start: 2017-08-18 | End: 2017-08-18

## 2017-08-18 RX ORDER — MORPHINE SULFATE 2 MG/ML
2 INJECTION, SOLUTION INTRAMUSCULAR; INTRAVENOUS EVERY 10 MIN PRN
Status: DISCONTINUED | OUTPATIENT
Start: 2017-08-18 | End: 2017-08-19

## 2017-08-18 RX ORDER — HYDROCODONE BITARTRATE AND ACETAMINOPHEN 5; 325 MG/1; MG/1
1 TABLET ORAL AS NEEDED
Status: DISCONTINUED | OUTPATIENT
Start: 2017-08-18 | End: 2017-08-19

## 2017-08-18 RX ORDER — SODIUM CHLORIDE 9 MG/ML
INJECTION, SOLUTION INTRAVENOUS CONTINUOUS
Status: DISCONTINUED | OUTPATIENT
Start: 2017-08-18 | End: 2017-08-19

## 2017-08-18 RX ORDER — HYDROCODONE BITARTRATE AND ACETAMINOPHEN 10; 325 MG/1; MG/1
1 TABLET ORAL EVERY 6 HOURS PRN
Qty: 20 TABLET | Refills: 0 | Status: SHIPPED | OUTPATIENT
Start: 2017-08-18 | End: 2017-08-23

## 2017-08-18 RX ORDER — HYDROCODONE BITARTRATE AND ACETAMINOPHEN 5; 325 MG/1; MG/1
2 TABLET ORAL AS NEEDED
Status: DISCONTINUED | OUTPATIENT
Start: 2017-08-18 | End: 2017-08-19

## 2017-08-18 RX ORDER — HEPARIN SODIUM 1000 [USP'U]/ML
INJECTION, SOLUTION INTRAVENOUS; SUBCUTANEOUS AS NEEDED
Status: DISCONTINUED | OUTPATIENT
Start: 2017-08-18 | End: 2017-08-18 | Stop reason: SURG

## 2017-08-18 RX ORDER — LIDOCAINE HYDROCHLORIDE 10 MG/ML
INJECTION, SOLUTION EPIDURAL; INFILTRATION; INTRACAUDAL; PERINEURAL AS NEEDED
Status: DISCONTINUED | OUTPATIENT
Start: 2017-08-18 | End: 2017-08-18 | Stop reason: SURG

## 2017-08-18 RX ORDER — MORPHINE SULFATE 4 MG/ML
6 INJECTION, SOLUTION INTRAMUSCULAR; INTRAVENOUS EVERY 10 MIN PRN
Status: DISCONTINUED | OUTPATIENT
Start: 2017-08-18 | End: 2017-08-19

## 2017-08-18 RX ORDER — HYDROMORPHONE HYDROCHLORIDE 1 MG/ML
0.6 INJECTION, SOLUTION INTRAMUSCULAR; INTRAVENOUS; SUBCUTANEOUS EVERY 5 MIN PRN
Status: DISCONTINUED | OUTPATIENT
Start: 2017-08-18 | End: 2017-08-19

## 2017-08-18 RX ORDER — MORPHINE SULFATE 4 MG/ML
4 INJECTION, SOLUTION INTRAMUSCULAR; INTRAVENOUS EVERY 10 MIN PRN
Status: DISCONTINUED | OUTPATIENT
Start: 2017-08-18 | End: 2017-08-19

## 2017-08-18 RX ORDER — HYDROMORPHONE HYDROCHLORIDE 1 MG/ML
0.4 INJECTION, SOLUTION INTRAMUSCULAR; INTRAVENOUS; SUBCUTANEOUS EVERY 5 MIN PRN
Status: DISCONTINUED | OUTPATIENT
Start: 2017-08-18 | End: 2017-08-19

## 2017-08-18 RX ORDER — FAMOTIDINE 20 MG/1
20 TABLET ORAL ONCE
Status: COMPLETED | OUTPATIENT
Start: 2017-08-18 | End: 2017-08-18

## 2017-08-18 RX ORDER — ONDANSETRON 2 MG/ML
4 INJECTION INTRAMUSCULAR; INTRAVENOUS ONCE AS NEEDED
Status: ACTIVE | OUTPATIENT
Start: 2017-08-18 | End: 2017-08-18

## 2017-08-18 RX ORDER — HALOPERIDOL 5 MG/ML
0.25 INJECTION INTRAMUSCULAR ONCE AS NEEDED
Status: ACTIVE | OUTPATIENT
Start: 2017-08-18 | End: 2017-08-18

## 2017-08-18 RX ORDER — SODIUM CHLORIDE 9 MG/ML
INJECTION, SOLUTION INTRAVENOUS CONTINUOUS PRN
Status: DISCONTINUED | OUTPATIENT
Start: 2017-08-18 | End: 2017-08-18 | Stop reason: SURG

## 2017-08-18 RX ORDER — ONDANSETRON 2 MG/ML
INJECTION INTRAMUSCULAR; INTRAVENOUS AS NEEDED
Status: DISCONTINUED | OUTPATIENT
Start: 2017-08-18 | End: 2017-08-18 | Stop reason: SURG

## 2017-08-18 RX ORDER — SODIUM CHLORIDE, SODIUM LACTATE, POTASSIUM CHLORIDE, CALCIUM CHLORIDE 600; 310; 30; 20 MG/100ML; MG/100ML; MG/100ML; MG/100ML
INJECTION, SOLUTION INTRAVENOUS CONTINUOUS
Status: DISCONTINUED | OUTPATIENT
Start: 2017-08-18 | End: 2017-08-19

## 2017-08-18 RX ORDER — HYDROMORPHONE HYDROCHLORIDE 1 MG/ML
0.2 INJECTION, SOLUTION INTRAMUSCULAR; INTRAVENOUS; SUBCUTANEOUS EVERY 5 MIN PRN
Status: DISCONTINUED | OUTPATIENT
Start: 2017-08-18 | End: 2017-08-19

## 2017-08-18 RX ADMIN — ONDANSETRON 4 MG: 2 INJECTION INTRAMUSCULAR; INTRAVENOUS at 21:51:00

## 2017-08-18 RX ADMIN — HEPARIN SODIUM 3000 UNITS: 1000 INJECTION, SOLUTION INTRAVENOUS; SUBCUTANEOUS at 22:31:00

## 2017-08-18 RX ADMIN — SODIUM CHLORIDE: 9 INJECTION, SOLUTION INTRAVENOUS at 23:27:00

## 2017-08-18 RX ADMIN — SODIUM CHLORIDE: 9 INJECTION, SOLUTION INTRAVENOUS at 21:44:00

## 2017-08-18 RX ADMIN — LIDOCAINE HYDROCHLORIDE 50 MG: 10 INJECTION, SOLUTION EPIDURAL; INFILTRATION; INTRACAUDAL; PERINEURAL at 21:51:00

## 2017-08-19 VITALS
OXYGEN SATURATION: 99 % | DIASTOLIC BLOOD PRESSURE: 63 MMHG | WEIGHT: 293 LBS | BODY MASS INDEX: 45.99 KG/M2 | TEMPERATURE: 98 F | HEIGHT: 67 IN | SYSTOLIC BLOOD PRESSURE: 141 MMHG | HEART RATE: 72 BPM | RESPIRATION RATE: 16 BRPM

## 2017-08-19 LAB — GLUCOSE BLDC GLUCOMTR-MCNC: 124 MG/DL (ref 70–99)

## 2017-08-19 PROCEDURE — 82962 GLUCOSE BLOOD TEST: CPT

## 2017-08-19 RX ORDER — ONDANSETRON 2 MG/ML
4 INJECTION INTRAMUSCULAR; INTRAVENOUS EVERY 6 HOURS PRN
Status: DISCONTINUED | OUTPATIENT
Start: 2017-08-19 | End: 2017-08-19

## 2017-08-19 NOTE — BRIEF OP NOTE
[facility] POST ANESTHESIA CARE UNIT  Brief Op Note       Patients Name: Danica Lopez  Attending Physician: Uzma Chavarria MD  Operating Physician: Hernan Bee MD  CSN: [id number]     Location:  OR  MRN: [id number]    Date of Birth: [birth year]

## 2017-08-19 NOTE — H&P
History & Physical Examination    Patient Name: Ranulfo Mckeon  MRN: [id number]  [facility]: [id number]  YOB: 1955    Diagnosis: End stage renal disease     History Present Illness: Patient is a 63year old female  has a past medical history 8/17/2017 at Unknown time   Blood Glucose Monitoring Suppl (ACCU-CHEK AFIA SMARTVIEW) W/DEVICE Does not apply Kit 1 strip by Does not apply route 2 (two) times daily.  Disp: 1 kit Rfl: 6 8/17/2017 at Unknown time   nystatin 460459 UNIT/GM External Cream Isaiah Disease Mother    • Hypertension Mother    • Cancer Sister      sinus        Smoking status: Never Smoker    Smokeless tobacco: Never Used    Alcohol use No       SYSTEM Check if Review is Normal Check if Physical Exam is Normal If not normal, please expla

## 2017-08-19 NOTE — ANESTHESIA PREPROCEDURE EVALUATION
Anesthesia PreOp Note    HPI:     Krystyna Gutierrez is a 63year old female who presents for preoperative consultation requested by: Naty Forbes MD    Date of Surgery: 8/18/2017    Procedure(s):  A.V. FISTULA  Indication: End stage renal disease Noted: 08/25/2016      Obesity, Class II, BMI 35-39.9, with comorbidity         Date Noted: 06/08/2016      Type 2 diabetes mellitus without complication [facility])         Date Noted: 01/05/2016      Lymphedema         Date Noted: 01/05/2016      HTN (hyperten times daily before meals. Disp: 1 pen Rfl: 6 Past Week   Glucose Blood (ACCU-CHEK DEBORAH PLUS) In Vitro Strip 1 each by Other route daily.  Disp: 100 strip Rfl: 3 8/17/2017 at Unknown time   Blood Glucose Monitoring Suppl (ACCU-CHEK AFIA SMARTVIEW) W/DEVICE No    Sexual activity: Yes     Other Topics Concern   None on file     Social History Narrative   None on file       Available pre-op labs reviewed.     Lab Results  Component Value Date   WBC 15.74 (H) 08/07/2017   RBC 3.97 08/07/2017   HGB 12.5 08/07/2017 Products Discussed With:  Patient      I have informed Ranulfo Mckeon  of the nature of the anesthetic plan, benefits, risks, major complications, and any alternative forms of anesthetic management.    All of the patient's questions were answered to

## 2017-08-19 NOTE — ANESTHESIA POSTPROCEDURE EVALUATION
Patient: Whitney Toure    Procedure Summary     Date:  08/18/17 Room / Location:  [address] [place] [address] MAIN OR 02 / [address] [place] [address] MAIN OR    Anesthesia Start:  2144 Anesthesia Stop:      Procedure:   A.V. FISTULA (Left ) Diagnosis:  (End stage renal disease )    Surgeon:  Rich Downs

## 2017-08-19 NOTE — OPERATIVE REPORT
[facility]    PATIENT'S NAME: DIA Andrade   ATTENDING PHYSICIAN: Hernan Stack MD   OPERATING PHYSICIAN: Hernan Stack MD   PATIENT ACCOUNT#:   [de-identified]    LOCATION:  [address] [place] [address]  MEDICAL RECORD #:   [id number] sterile manner. Time-out was done. A 4 cm incision was made at the lateral distal volar forearm. Identified the cephalic vein, freed up a length of about 20 cm. I clipped some side branches or ligated some side branches.   I then identified the radial a 23:37:54  t: 08/19/2017 00:54:29  Job [id number]  BURT/

## 2017-08-21 PROCEDURE — 86334 IMMUNOFIX E-PHORESIS SERUM: CPT | Performed by: INTERNAL MEDICINE

## 2017-08-21 PROCEDURE — 83883 ASSAY NEPHELOMETRY NOT SPEC: CPT | Performed by: INTERNAL MEDICINE

## 2017-08-21 PROCEDURE — 84165 PROTEIN E-PHORESIS SERUM: CPT | Performed by: INTERNAL MEDICINE

## 2017-08-23 ENCOUNTER — HOSPITAL ENCOUNTER (EMERGENCY)
Facility: HOSPITAL | Age: 62
Discharge: HOME | End: 2017-08-23
Attending: EMERGENCY MEDICINE
Payer: COMMERCIAL

## 2017-08-23 ENCOUNTER — HOSPITAL ENCOUNTER (OUTPATIENT)
Dept: INTERVENTIONAL RADIOLOGY/VASCULAR | Facility: HOSPITAL | Age: 62
Discharge: HOME | End: 2017-08-23
Attending: INTERNAL MEDICINE | Admitting: INTERNAL MEDICINE
Payer: COMMERCIAL

## 2017-08-23 VITALS
WEIGHT: 293 LBS | SYSTOLIC BLOOD PRESSURE: 121 MMHG | HEART RATE: 75 BPM | HEIGHT: 67 IN | TEMPERATURE: 98 F | DIASTOLIC BLOOD PRESSURE: 52 MMHG | RESPIRATION RATE: 16 BRPM | BODY MASS INDEX: 45.99 KG/M2 | OXYGEN SATURATION: 97 %

## 2017-08-23 VITALS
DIASTOLIC BLOOD PRESSURE: 54 MMHG | HEART RATE: 77 BPM | SYSTOLIC BLOOD PRESSURE: 128 MMHG | RESPIRATION RATE: 18 BRPM | OXYGEN SATURATION: 97 %

## 2017-08-23 DIAGNOSIS — T82.41XA HEMODIALYSIS CATHETER MALFUNCTION, INITIAL ENCOUNTER (HCC): Primary | ICD-10-CM

## 2017-08-23 LAB
ANION GAP SERPL CALC-SCNC: 7 MMOL/L (ref 0–18)
BASOPHILS # BLD: 0.1 K/UL (ref 0–0.2)
BASOPHILS NFR BLD: 1 %
BUN SERPL-MCNC: 33 MG/DL (ref 8–20)
BUN/CREAT SERPL: 8.5 (ref 10–20)
CALCIUM SERPL-MCNC: 9.3 MG/DL (ref 8.5–10.5)
CHLORIDE SERPL-SCNC: 101 MMOL/L (ref 95–110)
CO2 SERPL-SCNC: 30 MMOL/L (ref 22–32)
CREAT SERPL-MCNC: 3.87 MG/DL (ref 0.5–1.5)
EOSINOPHIL # BLD: 0.2 K/UL (ref 0–0.7)
EOSINOPHIL NFR BLD: 3 %
ERYTHROCYTE [DISTWIDTH] IN BLOOD BY AUTOMATED COUNT: 15.3 % (ref 11–15)
GLUCOSE SERPL-MCNC: 152 MG/DL (ref 70–99)
HCT VFR BLD AUTO: 36.5 % (ref 35–48)
HGB BLD-MCNC: 12 G/DL (ref 12–16)
LYMPHOCYTES # BLD: 1.1 K/UL (ref 1–4)
LYMPHOCYTES NFR BLD: 14 %
MCH RBC QN AUTO: 32.9 PG (ref 27–32)
MCHC RBC AUTO-ENTMCNC: 32.9 G/DL (ref 32–37)
MCV RBC AUTO: 99.9 FL (ref 80–100)
MONOCYTES # BLD: 0.9 K/UL (ref 0–1)
MONOCYTES NFR BLD: 12 %
NEUTROPHILS # BLD AUTO: 5.4 K/UL (ref 1.8–7.7)
NEUTROPHILS NFR BLD: 70 %
OSMOLALITY UR CALC.SUM OF ELEC: 296 MOSM/KG (ref 275–295)
PLATELET # BLD AUTO: 125 K/UL (ref 140–400)
PMV BLD AUTO: 9.1 FL (ref 7.4–10.3)
POTASSIUM SERPL-SCNC: 4.4 MMOL/L (ref 3.3–5.1)
RBC # BLD AUTO: 3.66 M/UL (ref 3.7–5.4)
SODIUM SERPL-SCNC: 138 MMOL/L (ref 136–144)
WBC # BLD AUTO: 7.7 K/UL (ref 4–11)

## 2017-08-23 PROCEDURE — 36415 COLL VENOUS BLD VENIPUNCTURE: CPT

## 2017-08-23 PROCEDURE — 99152 MOD SED SAME PHYS/QHP 5/>YRS: CPT

## 2017-08-23 PROCEDURE — 99283 EMERGENCY DEPT VISIT LOW MDM: CPT

## 2017-08-23 PROCEDURE — 80048 BASIC METABOLIC PNL TOTAL CA: CPT | Performed by: EMERGENCY MEDICINE

## 2017-08-23 PROCEDURE — 02HV33Z INSERTION OF INFUSION DEVICE INTO SUPERIOR VENA CAVA, PERCUTANEOUS APPROACH: ICD-10-PCS | Performed by: RADIOLOGY

## 2017-08-23 PROCEDURE — 77001 FLUOROGUIDE FOR VEIN DEVICE: CPT

## 2017-08-23 PROCEDURE — 85025 COMPLETE CBC W/AUTO DIFF WBC: CPT | Performed by: EMERGENCY MEDICINE

## 2017-08-23 PROCEDURE — 99153 MOD SED SAME PHYS/QHP EA: CPT

## 2017-08-23 PROCEDURE — 36558 INSERT TUNNELED CV CATH: CPT

## 2017-08-23 RX ORDER — SODIUM CHLORIDE 9 MG/ML
INJECTION, SOLUTION INTRAVENOUS
Status: COMPLETED
Start: 2017-08-23 | End: 2017-08-23

## 2017-08-23 RX ORDER — MIDAZOLAM HYDROCHLORIDE 1 MG/ML
INJECTION INTRAMUSCULAR; INTRAVENOUS
Status: COMPLETED
Start: 2017-08-23 | End: 2017-08-23

## 2017-08-23 RX ORDER — LIDOCAINE HYDROCHLORIDE 20 MG/ML
INJECTION, SOLUTION EPIDURAL; INFILTRATION; INTRACAUDAL; PERINEURAL
Status: COMPLETED
Start: 2017-08-23 | End: 2017-08-23

## 2017-08-23 RX ORDER — HEPARIN SODIUM 1000 [USP'U]/ML
INJECTION, SOLUTION INTRAVENOUS; SUBCUTANEOUS
Status: COMPLETED
Start: 2017-08-23 | End: 2017-08-23

## 2017-08-23 RX ORDER — HYDROCODONE BITARTRATE AND ACETAMINOPHEN 5; 325 MG/1; MG/1
TABLET ORAL
Status: DISCONTINUED
Start: 2017-08-23 | End: 2017-08-23

## 2017-08-23 RX ORDER — HYDROCODONE BITARTRATE AND ACETAMINOPHEN 5; 325 MG/1; MG/1
1 TABLET ORAL EVERY 6 HOURS PRN
Status: DISCONTINUED | OUTPATIENT
Start: 2017-08-23 | End: 2017-08-23

## 2017-08-23 RX ADMIN — HYDROCODONE BITARTRATE AND ACETAMINOPHEN 1 TABLET: 5; 325 TABLET ORAL at 15:56:00

## 2017-08-23 NOTE — ED PROVIDER NOTES
Patient Seen in: [facility] AND [facility] Emergency Department    History   Patient presents with:  Cath Tube Problem (gastrointestinal, urinary, integumentary)    Stated Complaint: Arthea Link Cath malfunction     HPI    The patient is a 57-year-old female who pre Take 4 mg by mouth daily. Insulin Glargine (BASAGLAR KWIKPEN) 100 UNIT/ML Subcutaneous Solution Pen-injector,  Inject 28 Units into the skin every evening. Patient taking differently: Inject 24 Units into the skin every evening.      Glucose Blood (BLOOD Pupils are equal, round, and reactive to light. Neck: Normal range of motion. Neck supple. No JVD present. Cardiovascular: Normal rate, regular rhythm, normal heart sounds and intact distal pulses.     Pulmonary/Chest: Effort normal and breath sounds no Course  ------------------------------------------------------------  MDM   Discussed with IR and due to patient's p.o. status Albino catheter procedure scheduled for 2 PM today.   Patient discharged from ER and taken directly to IR for replacement of cath

## 2017-08-23 NOTE — ED NOTES
Patient stable to discharge to IR per Md, pt accompanied to IR by Rn, PIV left in place for IR procedure, report handed off to IR RN

## 2017-08-23 NOTE — ED INITIAL ASSESSMENT (HPI)
Pt here for reny cath malfunction pt had cath placed three weeks ago and had dialysis yesterday and today the cath is coming out of skin, + bld noted to area

## 2017-08-28 PROBLEM — D69.6 THROMBOCYTOPENIA (HCC): Status: ACTIVE | Noted: 2017-08-28

## 2017-08-28 PROCEDURE — 86334 IMMUNOFIX E-PHORESIS SERUM: CPT | Performed by: INTERNAL MEDICINE

## 2017-08-28 PROCEDURE — 83883 ASSAY NEPHELOMETRY NOT SPEC: CPT | Performed by: INTERNAL MEDICINE

## 2017-08-28 PROCEDURE — 84165 PROTEIN E-PHORESIS SERUM: CPT | Performed by: INTERNAL MEDICINE

## 2017-08-29 NOTE — PAYOR COMM NOTE
REF: [id number]  1 DAY APPROVED PER IEXCHANGE- NOTES FROM [address] [place] [address] :Fax received after 7 days of discharge. Retro Review Case. fax clinicals to retro review department at [phone].  REQUESTING ADDITIONAL DAYS -      7/18/17    Subjective:     Multiple myeloma, remission status unspecified (HCC)    Type 2 diabetes mellitus without complication, with long-term current use of insulin (HCC)    Cough     Symptomatically improved, fever resolved  Blood cultures growing gram neg bacilli--final ID pend Other  - Just completed IV fortaz course with line exchange (placed in the same location ? residual tunnel infection)  - Now with recurrent fevers, + blood cultures once again  - Continue IV cefepime as Rx     2.  Active fevers, chills, rigors at present  - CO2 28 07/20/2017    07/20/2017   CA 8.3 07/20/2017   ALB 2.3 07/20/2017   PHOS 4.4 07/20/2017                    Assessment and Plan:   Principal Problem:    Fever in other diseases  Active Problems:    Fever, unspecified fever cause    Multiple tenderness.    Neurological: She is alert.         Results:      Lab Results  Component Value Date   WBC 8.9 07/21/2017   HGB 9.3 07/21/2017   HCT 28.6 07/21/2017    07/21/2017   CREATSERUM 4.05 07/21/2017   BUN 32 07/21/2017    07/21/2017   K

## 2017-09-05 PROBLEM — F33.9 EPISODE OF RECURRENT MAJOR DEPRESSIVE DISORDER, UNSPECIFIED DEPRESSION EPISODE SEVERITY (HCC): Status: ACTIVE | Noted: 2017-09-05

## 2017-09-18 PROCEDURE — 83883 ASSAY NEPHELOMETRY NOT SPEC: CPT | Performed by: INTERNAL MEDICINE

## 2017-09-18 PROCEDURE — 81001 URINALYSIS AUTO W/SCOPE: CPT | Performed by: PHYSICIAN ASSISTANT

## 2017-09-18 PROCEDURE — 86334 IMMUNOFIX E-PHORESIS SERUM: CPT | Performed by: INTERNAL MEDICINE

## 2017-09-18 PROCEDURE — 84165 PROTEIN E-PHORESIS SERUM: CPT | Performed by: INTERNAL MEDICINE

## 2017-09-26 ENCOUNTER — APPOINTMENT (OUTPATIENT)
Dept: LAB | Facility: HOSPITAL | Age: 62
End: 2017-09-26
Attending: INTERNAL MEDICINE
Payer: COMMERCIAL

## 2017-09-26 DIAGNOSIS — C90.00 MULTIPLE MYELOMA NOT HAVING ACHIEVED REMISSION (HCC): ICD-10-CM

## 2017-09-26 PROCEDURE — 84165 PROTEIN E-PHORESIS SERUM: CPT | Performed by: INTERNAL MEDICINE

## 2017-09-26 PROCEDURE — 86334 IMMUNOFIX E-PHORESIS SERUM: CPT | Performed by: INTERNAL MEDICINE

## 2017-09-26 PROCEDURE — 83883 ASSAY NEPHELOMETRY NOT SPEC: CPT | Performed by: INTERNAL MEDICINE

## 2017-09-26 PROCEDURE — 36415 COLL VENOUS BLD VENIPUNCTURE: CPT | Performed by: INTERNAL MEDICINE

## 2017-10-02 ENCOUNTER — HOSPITAL ENCOUNTER (INPATIENT)
Facility: HOSPITAL | Age: 62
LOS: 8 days | Discharge: HOME | DRG: 391 | End: 2017-10-10
Attending: INTERNAL MEDICINE | Admitting: INTERNAL MEDICINE
Payer: COMMERCIAL

## 2017-10-02 ENCOUNTER — APPOINTMENT (OUTPATIENT)
Dept: CT IMAGING | Facility: HOSPITAL | Age: 62
DRG: 391 | End: 2017-10-02
Attending: INTERNAL MEDICINE
Payer: COMMERCIAL

## 2017-10-02 PROBLEM — R10.9 ABDOMINAL PAIN: Status: ACTIVE | Noted: 2017-10-02

## 2017-10-02 PROCEDURE — 74176 CT ABD & PELVIS W/O CONTRAST: CPT | Performed by: INTERNAL MEDICINE

## 2017-10-02 PROCEDURE — 82962 GLUCOSE BLOOD TEST: CPT

## 2017-10-02 PROCEDURE — 87493 C DIFF AMPLIFIED PROBE: CPT | Performed by: INTERNAL MEDICINE

## 2017-10-02 RX ORDER — MORPHINE SULFATE 4 MG/ML
4 INJECTION, SOLUTION INTRAMUSCULAR; INTRAVENOUS EVERY 2 HOUR PRN
Status: DISCONTINUED | OUTPATIENT
Start: 2017-10-02 | End: 2017-10-10

## 2017-10-02 RX ORDER — LORAZEPAM 1 MG/1
1 TABLET ORAL EVERY 4 HOURS PRN
Status: DISCONTINUED | OUTPATIENT
Start: 2017-10-02 | End: 2017-10-10

## 2017-10-02 RX ORDER — MORPHINE SULFATE 2 MG/ML
2 INJECTION, SOLUTION INTRAMUSCULAR; INTRAVENOUS EVERY 2 HOUR PRN
Status: DISCONTINUED | OUTPATIENT
Start: 2017-10-02 | End: 2017-10-10

## 2017-10-02 RX ORDER — ONDANSETRON 2 MG/ML
4 INJECTION INTRAMUSCULAR; INTRAVENOUS EVERY 6 HOURS PRN
Status: DISCONTINUED | OUTPATIENT
Start: 2017-10-02 | End: 2017-10-10

## 2017-10-02 RX ORDER — MORPHINE SULFATE 2 MG/ML
1 INJECTION, SOLUTION INTRAMUSCULAR; INTRAVENOUS EVERY 2 HOUR PRN
Status: DISCONTINUED | OUTPATIENT
Start: 2017-10-02 | End: 2017-10-10

## 2017-10-02 RX ORDER — DOCUSATE SODIUM 100 MG/1
100 CAPSULE, LIQUID FILLED ORAL 2 TIMES DAILY
Status: DISCONTINUED | OUTPATIENT
Start: 2017-10-02 | End: 2017-10-10

## 2017-10-02 RX ORDER — POLYETHYLENE GLYCOL 3350 17 G/17G
17 POWDER, FOR SOLUTION ORAL DAILY PRN
Status: DISCONTINUED | OUTPATIENT
Start: 2017-10-02 | End: 2017-10-10

## 2017-10-02 RX ORDER — BISACODYL 10 MG
10 SUPPOSITORY, RECTAL RECTAL
Status: DISCONTINUED | OUTPATIENT
Start: 2017-10-02 | End: 2017-10-10

## 2017-10-02 RX ORDER — HEPARIN SODIUM 5000 [USP'U]/ML
5000 INJECTION, SOLUTION INTRAVENOUS; SUBCUTANEOUS EVERY 12 HOURS SCHEDULED
Status: DISCONTINUED | OUTPATIENT
Start: 2017-10-02 | End: 2017-10-10

## 2017-10-02 RX ORDER — SODIUM PHOSPHATE, DIBASIC AND SODIUM PHOSPHATE, MONOBASIC 7; 19 G/133ML; G/133ML
1 ENEMA RECTAL ONCE AS NEEDED
Status: DISCONTINUED | OUTPATIENT
Start: 2017-10-02 | End: 2017-10-10

## 2017-10-02 RX ORDER — ACETAMINOPHEN 325 MG/1
650 TABLET ORAL EVERY 6 HOURS PRN
Status: DISCONTINUED | OUTPATIENT
Start: 2017-10-02 | End: 2017-10-10

## 2017-10-02 RX ORDER — FAMOTIDINE 20 MG/1
20 TABLET ORAL DAILY
Status: DISCONTINUED | OUTPATIENT
Start: 2017-10-03 | End: 2017-10-10

## 2017-10-02 RX ORDER — DEXTROSE MONOHYDRATE 25 G/50ML
50 INJECTION, SOLUTION INTRAVENOUS AS NEEDED
Status: DISCONTINUED | OUTPATIENT
Start: 2017-10-02 | End: 2017-10-10

## 2017-10-02 RX ORDER — SODIUM CHLORIDE 0.9 % (FLUSH) 0.9 %
3 SYRINGE (ML) INJECTION AS NEEDED
Status: DISCONTINUED | OUTPATIENT
Start: 2017-10-02 | End: 2017-10-10

## 2017-10-02 RX ORDER — INSULIN ASPART 100 [IU]/ML
INJECTION, SOLUTION INTRAVENOUS; SUBCUTANEOUS
Status: ON HOLD | COMMUNITY
End: 2017-11-29 | Stop reason: CLARIF

## 2017-10-02 NOTE — H&P
[facility]    History and Physical    Krystyna Gutierrez Patient Status:  Inpatient    3/7/1955 MRN [id number]   Location [facility] 4W/SW/SE Attending Elizabeth Mark MD   Hosp Day # 0 PCP Freddy Wong MD     Date:  10/ Smokeless tobacco: Never Used                      Alcohol use: No                Allergies/Medications:    Allergies:   Barium                  Hives  Iodine (Topical)        Hives  Radiology Contrast *    Hives  Sulfa Antibiotics       Rash  Tetanus T for dizziness and light-headedness. Psychiatric/Behavioral: Positive for depressed mood. Negative for agitation. Physical Exam:   Vital Signs:  Blood pressure 130/69, pulse 90, temperature 98.3 °F (36.8 °C), temperature source Oral, resp.  rate 18, Problems:    Multiple myeloma (HCC)    HTN (hypertension)    ESRD (end stage renal disease) ([facility]    Lymphedema    Myeloma cast nephropathy (HCC)    [facility]    Type 2 diabetes mellitus without complication, with long-term curre

## 2017-10-03 ENCOUNTER — APPOINTMENT (OUTPATIENT)
Dept: ULTRASOUND IMAGING | Facility: HOSPITAL | Age: 62
DRG: 391 | End: 2017-10-03
Attending: INTERNAL MEDICINE
Payer: COMMERCIAL

## 2017-10-03 PROBLEM — T82.590A DIALYSIS AV FISTULA MALFUNCTION (HCC): Status: ACTIVE | Noted: 2017-10-03

## 2017-10-03 PROCEDURE — 82962 GLUCOSE BLOOD TEST: CPT

## 2017-10-03 PROCEDURE — 85025 COMPLETE CBC W/AUTO DIFF WBC: CPT | Performed by: INTERNAL MEDICINE

## 2017-10-03 PROCEDURE — 90935 HEMODIALYSIS ONE EVALUATION: CPT

## 2017-10-03 PROCEDURE — 76700 US EXAM ABDOM COMPLETE: CPT | Performed by: INTERNAL MEDICINE

## 2017-10-03 PROCEDURE — 80053 COMPREHEN METABOLIC PANEL: CPT | Performed by: INTERNAL MEDICINE

## 2017-10-03 PROCEDURE — 85060 BLOOD SMEAR INTERPRETATION: CPT | Performed by: INTERNAL MEDICINE

## 2017-10-03 RX ORDER — ALBUMIN (HUMAN) 12.5 G/50ML
100 SOLUTION INTRAVENOUS AS NEEDED
Status: DISCONTINUED | OUTPATIENT
Start: 2017-10-03 | End: 2017-10-10

## 2017-10-03 RX ORDER — HEPARIN SODIUM 1000 [USP'U]/ML
1.5 INJECTION, SOLUTION INTRAVENOUS; SUBCUTANEOUS
Status: DISCONTINUED | OUTPATIENT
Start: 2017-10-05 | End: 2017-10-06

## 2017-10-03 RX ORDER — HEPARIN SODIUM 1000 [USP'U]/ML
1.5 INJECTION, SOLUTION INTRAVENOUS; SUBCUTANEOUS ONCE
Status: DISCONTINUED | OUTPATIENT
Start: 2017-10-03 | End: 2017-10-10

## 2017-10-03 RX ORDER — ALBUMIN (HUMAN) 12.5 G/50ML
100 SOLUTION INTRAVENOUS
Status: DISCONTINUED | OUTPATIENT
Start: 2017-10-05 | End: 2017-10-06

## 2017-10-03 NOTE — OCCUPATIONAL THERAPY NOTE
Attempted OT evaluation this afternoon, however patient in HD until this evening. Will attempt OT evaluation tomorrow 10/4.

## 2017-10-03 NOTE — CONSULTS
[facility]    Report of Consultation    Darcie Barrett Patient Status:  Inpatient    3/7/1955 MRN [id number]   Location [facility] 4W/SW/SE Attending Tawnya North MD   Hosp Day # 1 PCP Moreno Maritnez MD     Date of Intravenous PRN   LORazepam (ATIVAN) tab 1 mg 1 mg Oral Q4H PRN   famoTIDine (PEPCID) tab 20 mg 20 mg Oral Daily   Vilazodone HCl (VIIBRYD) tab 10 mg 10 mg Oral Daily   Normal Saline Flush 0.9 % injection 3 mL 3 mL Intravenous PRN   Heparin Sodium (Porcine [facility]) 100 UNIT/ML Subcutaneous Solution Pen-injector Inject 28 Units into the skin every evening.  (Patient taking differently: Inject 24 Units into the skin every evening.  )   Glucose Blood (BLOOD GLUCOSE TEST) In Vitro Strip Test blood sugar patient's body habitus. The left kidney and spleen could not be visualized sonographically. Thus, the 1.2 cm exophytic mass in the ventral aspect of the left kidney upper pole can not be further characterized. 2. Hepatic steatosis.  3. Small amount of depen

## 2017-10-03 NOTE — PROGRESS NOTES
[facility]    Progress Note    Elizabeth Bazan Patient Status:  Inpatient    3/7/1955 MRN [id number]   Location [facility] 4W/SW/SE Attending Leigh Ann López MD   Hosp Day # 1 PCP Carmen Kwon MD     Subjective: this could reflect low-grade nonspecific colitis. Otherwise no acute noncontrast CT findings in the abdomen or pelvis. 2. Bibasilar atelectasis. 3. Indeterminate 1.2 cm mass in the left kidney.  This could represent a solid mass versus a hemorrhagic/prote

## 2017-10-03 NOTE — CONSULTS
Hematology/Oncology Consult Note        NAME: Charo Mayer - ROOM: 911/362-A - MRN: [id number] - Age: 63year old - : 3/7/1955    Reason for Consult: abdominal pain, multiple myeloma     Marlon Dutton is a 63 y.o female well known to me who presents t TONSILLECTOMY  Family History   Problem Relation Age of Onset   • Diabetes Father    • Heart Disease Father    • Pulmonary Disease Mother    • Hypertension Mother    • Cancer Sister      sinus       SOCIAL HISTORY:    Smoking status: Never Smoker    Smokel positive BS. Extremity: no edema   Skin: No rashes or lesions.     Labs:     Recent Labs   Lab  10/03/17   0445   RBC  3.92   HGB  12.3   HCT  37.0   MCV  94.2   MCH  31.3   MCHC  33.3   RDW  13.9   WBC  10.9   PLT  83*     Recent Labs   Lab  10/03/17   0 No monoclonal protein detected by immunofixation. . . .    KAPPA FREE LIGHT CHAIN      0.330 - 1.940 mg/dL 5.801 (H)   LAMBDA FREE LIGHT CHAIN      0.571 - 2.630 mg/dL 3.924 (H)   KAPPA/LAMBDA FLC RATIO      0.26 - 1.65 1.48     Component      Latest Ref R 1.26   PROTEIN ELECT INTERPRETATION       No apparent monoclonal protein on serum electrophoresis. . . . IMMUNOFIXATION       No monoclonal protein detected by immunofixation. . . .    KAPPA FREE LIGHT CHAIN      0.330 - 1.940 mg/dL 10.363 (H)   RICHIE GLOBULIN      0.60 - 1.00 g/dL 1.02 (H)   BETA GLOBULIN      0.70 - 1.20 g/dL 0.85   GAMMA GLOBULIN      0.60 - 1.60 g/dL 0.81   A/G RATIO       1.07   PROTEIN ELECT INTERPRETATION       No apparent monoclonal protein on serum electrophoresis. . . .    IMMU 5/4/2017   Total Protein      6.1 - 8.3 g/dL 8.4 (H)   ALBUMIN, SERUM      3.50 - 4.80 g/dL 4.59   ALPHA-1 GLOBULIN      0.10 - 0.30 g/dL 0.28   ALPHA-2 GLOBULIN      0.60 - 1.00 g/dL 1.18 (H)   BETA GLOBULIN      0.70 - 1.20 g/dL 1.16   GAMMA GLOBULIN

## 2017-10-03 NOTE — PHYSICAL THERAPY NOTE
Attempted to see patient for physical therapy evaluation, patient at dialysis at this time. Will attempt to see patient tomorrow for physical therapy evaluation as time permits. RN aware.

## 2017-10-04 PROCEDURE — 85025 COMPLETE CBC W/AUTO DIFF WBC: CPT | Performed by: INTERNAL MEDICINE

## 2017-10-04 PROCEDURE — 80069 RENAL FUNCTION PANEL: CPT | Performed by: INTERNAL MEDICINE

## 2017-10-04 PROCEDURE — 87340 HEPATITIS B SURFACE AG IA: CPT | Performed by: INTERNAL MEDICINE

## 2017-10-04 PROCEDURE — 97165 OT EVAL LOW COMPLEX 30 MIN: CPT

## 2017-10-04 PROCEDURE — 86704 HEP B CORE ANTIBODY TOTAL: CPT | Performed by: INTERNAL MEDICINE

## 2017-10-04 PROCEDURE — 82962 GLUCOSE BLOOD TEST: CPT

## 2017-10-04 PROCEDURE — 97161 PT EVAL LOW COMPLEX 20 MIN: CPT

## 2017-10-04 PROCEDURE — 80500 HEPATITIS B PROFILE: CPT | Performed by: INTERNAL MEDICINE

## 2017-10-04 PROCEDURE — 86706 HEP B SURFACE ANTIBODY: CPT | Performed by: INTERNAL MEDICINE

## 2017-10-04 NOTE — PHYSICAL THERAPY NOTE
PHYSICAL THERAPY EVALUATION - INPATIENT     Room Number: 441/441-A  Evaluation Date: 10/4/2017  Type of Evaluation: Initial  Physician Order: PT Eval and Treat    Presenting Problem: Abdominal pain  Reason for Therapy: Mobility Dysfunction and Discharg Abdominal pain  Active Problems:    Multiple myeloma (HCC)    HTN (hypertension)    ESRD (end stage renal disease) ([facility]    Lymphedema    Myeloma cast nephropathy (HCC)    [facility]    Type 2 diabetes mellitus without complicatio BEARING RESTRICTION  Weight Bearing Restriction: None                PAIN ASSESSMENT  Ratin  Location: Abdomen  Management Techniques: Activity promotion; Body mechanics;Repositioning    COGNITION  · Overall Cognitive Status:  WFL - within functional li End of Session: In bed;Needs met;Call light within reach;RN aware of session/findings; All patient questions and concerns addressed; Family present; Alarm set    CURRENT GOALS    Goals to be met by: 10/10/17  Patient Goal Patient's self-stated goal is: to go

## 2017-10-04 NOTE — PROGRESS NOTES
Hematology/Oncology  Progress Note        NAME: Glorious Epley - ROOM: 994/037-X - MRN: [id number] - Age: 63year old - : 3/7/1955    Subjective:  Still with right sided abdominal pain. Denies any fevers/chills. No diarrhea but afraid to eat. BS.   Extremity: no edema   Skin: No rashes or lesions.     Recent Labs   Lab  10/04/17   0441   RBC  3.83   HGB  12.0   HCT  36.3   MCV  94.7   MCH  31.4   MCHC  33.1   RDW  14.2   WBC  9.8   PLT  93*     Recent Labs   Lab  10/03/17   0445  10/04/17   0441

## 2017-10-04 NOTE — PROGRESS NOTES
[facility]    Progress Note    Kelly Bonilla Patient Status:  Inpatient    3/7/1955 MRN [id number]   Location [facility] 4W/SW/SE Attending Lilibeth Baltazar MD   Hosp Day # 2 PCP Carlos Juarez MD     Subjective: hydronephrosis. Ct Abdomen+pelvis(cpt=74176)    Result Date: 10/3/2017  CONCLUSION:   1. Equivocal wall thickening of the descending colon although this segment is underdistended.  Given the clinical scenario, this could reflect low-grade nonspecifi sugars are currently controlled    Blood pressure is stable    Lorena Mcmullen MD  10/4/2017

## 2017-10-04 NOTE — CONSULTS
REFERRING PHYSICIAN: Dr. Langley ref. provider found    HPI:         Thank you very much for requesting me to see the patient. As you know, María Joshi is a 63year old female who is presently admitted secondary to abdominal pain/syncpe.  Constipat the 1.2 cm exophytic mass in the ventral aspect of the left kidney upper pole can not be further characterized. 2. Hepatic steatosis. 3. Small amount of dependent sludge in the gallbladder. 4. Right renal cortical scar. No hydronephrosis.     SOC: no tob; Units Subcutaneous TID CC   insulin detemir (LEVEMIR) 100 UNIT/ML flextouch 10 Units 10 Units Subcutaneous Nightly   influenza vaccine split quad (FLULAVAL) ages 6 months to 65 years inj 0.5ml 0.5 mL Intramuscular Prior to discharge         Iodine (Topical recommend HIDA scan with CCK and surgical consultation. 3.) recommend colonoscopy after complete recovery. The patient indicates understanding of these issues and agrees to the plan. Thank you!        Sylwia Cervantes MD.       [facility]

## 2017-10-04 NOTE — PAYOR COMM NOTE
Date of Admission:  10/2/2017     HPI:   Pt is a 64 y/o female with history multiple myeloma causing renal failure on dialysis currently receiving chemotherapy who woke up this am with c/o RLQ abd pain. Pt felt as if she needed to have BM, but couldn't.  Suh (10 mg total) by mouth daily. nystatin 190316 UNIT/GM External Cream Apply 30 g topically 2 (two) times daily.  (Patient taking differently: Apply 30 g topically as needed.  )   Insulin Glargine (BASAGLAR KWIKPEN) 100 UNIT/ML Subcutaneous Solution Pen-inj cath left upper chest  Picc line right upper chest.  AV fistula left forearm with good thrill   Abdominal: Soft. Bowel sounds are normal. She exhibits no distension. There is no rebound and no guarding.    Some tenderness to deep palpation RLQ without rebou breath. Cardiovascular: Negative for chest pain and palpitations. Gastrointestinal: Positive for nausea, abdominal pain and diarrhea. Genitourinary: Negative for dysuria.         Objective:   Blood pressure 128/63, pulse 74, temperature 97.7 °F (36. Plan:   Principal Problem:    Abdominal pain  Active Problems:    Multiple myeloma (HCC)    HTN (hypertension)    ESRD (end stage renal disease) ([facility]    Lymphedema    Myeloma cast nephropathy ([facility]    ESRD (end stage renal disease) ([facility]    Type 2 diabete 10/04/2017   PLT 93 10/04/2017   CREATSERUM 4.98 10/04/2017   BUN 31 10/04/2017    10/04/2017   K 4.4 10/04/2017   CL 98 10/04/2017   CO2 29 10/04/2017   GLU 99 10/04/2017   CA 9.2 10/04/2017   ALB 3.0 10/04/2017   PHOS 5.9 10/04/2017         Us Ang visualization of left kidney and shoulder gallbladder sludge. We'll have GI evaluate. May need to proceed with HIDA scan and/or possible colonoscopy for further workup.   GI has been notified.     We'll see if MRI can be done for further evaluation of ramez

## 2017-10-04 NOTE — DIETARY NOTE
ADULT NUTRITION INITIAL ASSESSMENT    Pt is at moderate nutrition risk. Pt does not meet malnutrition criteria.       RECOMMENDATIONS TO MD:  Progress to solid diet as able--least restrictive (pt is diabetic and ESRD on HD, but in past was on general diet) than 40 kg/m2 - morbid obesity class III  IBW: 135 lbs        247% IBW  Usual Body Wt: greater than 500 lbs with loss to 355# mostly intentionally, but recently 23# loss due to abd pain (unintentional)       94% UBW    WEIGHT HISTORY:  Patient Weight(s) fo Liliana 183 296* 289   Labs consistent with ESRD with HD  A1C 6.4 4/25/17--improved.     NUTRITION RELATED PHYSICAL FINDINGS:  - Body Fat/Muscle Mass: morbidly obese per visual exam per visual exam.    - Fluid Accumulation: fluid status up and down with dial

## 2017-10-04 NOTE — OCCUPATIONAL THERAPY NOTE
OCCUPATIONAL THERAPY EVALUATION - INPATIENT     Room Number: 441/441-A  Evaluation Date: 10/4/2017  Type of Evaluation: Initial  Presenting Problem:  (Abdominal pain; multiple myeloma; ESRD on dialysis)    Physician Order: IP Consult to Darling Betancur Diabetes ([facility]    • Dialysis patient ([facility]    • DM (diabetes mellitus screen)    • High blood pressure    • HTN (hypertension)    • Lymphedema of lower extremity    • Multiple myeloma (HCC)    • Obesity, diabetes, and hypertension syndrome ([facility]    • Osteo Motor: WFL   Fine Motor: WFL     ACTIVITIES OF DAILY LIVING ASSESSMENT  AM-PAC ‘6-Clicks’ Inpatient Daily Activity Short Form  How much help from another person does the patient currently need…  -   Putting on and taking off regular lower body clothing?: A

## 2017-10-04 NOTE — CONSULTS
[facility]    Report of Consultation    Cory Jose Patient Status:  Inpatient    3/7/1955 MRN [id number]   Location [facility] 4W/SW/SE Attending Deargianfranco Haddad MD   Hosp Day # 1 PCP Yvonne Estarda MD     Date o HISTORY      Comment: ?RSO for cyst  No date: OTHER SURGICAL HISTORY      Comment: lymphedema surgery  No date: TONSILLECTOMY  Family History   Problem Relation Age of Onset   • Diabetes Father    • Heart Disease Father    • Pulmonary Disease Mother    • H morphINE sulfate (PF) 2 MG/ML injection 1 mg, 1 mg, Intravenous, Q2H PRN **OR** morphINE sulfate (PF) 2 MG/ML injection 2 mg, 2 mg, Intravenous, Q2H PRN **OR** morphINE sulfate (PF) 4 MG/ML injection 4 mg, 4 mg, Intravenous, Q2H PRN  •  dextrose 50% inject 10 point review of systems was completed. Pertinent positives and negatives noted in the the HPI. Physical Exam:  Blood pressure 128/62, pulse 77, temperature (!) 97.3 °F (36.3 °C), temperature source Oral, resp.  rate 19, height 170.2 cm (5' 7\"), weig Impression and Plan:  Problem List Items Addressed This Visit     None      Visit Diagnoses    None. Can use avf tomorrow one needle.  AVF marked        Carlos Brar MD  10/3/2017  11:41 PM

## 2017-10-05 ENCOUNTER — APPOINTMENT (OUTPATIENT)
Dept: NUCLEAR MEDICINE | Facility: HOSPITAL | Age: 62
DRG: 391 | End: 2017-10-05
Attending: INTERNAL MEDICINE
Payer: COMMERCIAL

## 2017-10-05 PROCEDURE — 80048 BASIC METABOLIC PNL TOTAL CA: CPT | Performed by: INTERNAL MEDICINE

## 2017-10-05 PROCEDURE — 82962 GLUCOSE BLOOD TEST: CPT

## 2017-10-05 PROCEDURE — 78227 HEPATOBIL SYST IMAGE W/DRUG: CPT | Performed by: INTERNAL MEDICINE

## 2017-10-05 PROCEDURE — 85025 COMPLETE CBC W/AUTO DIFF WBC: CPT | Performed by: INTERNAL MEDICINE

## 2017-10-05 PROCEDURE — 36592 COLLECT BLOOD FROM PICC: CPT

## 2017-10-05 PROCEDURE — 90935 HEMODIALYSIS ONE EVALUATION: CPT

## 2017-10-05 RX ORDER — 0.9 % SODIUM CHLORIDE 0.9 %
VIAL (ML) INJECTION
Status: COMPLETED
Start: 2017-10-05 | End: 2017-10-05

## 2017-10-05 RX ORDER — SODIUM CHLORIDE 9 MG/ML
INJECTION, SOLUTION INTRAVENOUS
Status: COMPLETED
Start: 2017-10-05 | End: 2017-10-05

## 2017-10-05 NOTE — PROGRESS NOTES
[facility]    Progress Note    Heidi Vivas Patient Status:  Inpatient    3/7/1955 MRN [id number]   Location [facility] 4W/SW/SE Attending Steve Calles MD   Hosp Day # 3 PCP Annette Rudolph MD       Subjective:   Landrum Merlin visualization of the gallbladder. 2. Decreased gallbladder ejection fraction of 21%. This raises the possibility of biliary dyskinesia or chronic cholecystitis.                Adele Becerril MD  10/5/2017

## 2017-10-05 NOTE — PROGRESS NOTES
[facility]    Progress Note    Kristilupe Colon Patient Status:  Inpatient    3/7/1955 MRN [id number]   Location [facility] 4W/SW/SE Attending Anh King MD   Hosp Day # 3 PCP Eliceo Wilson MD     Subjective: (hypertension)    ESRD (end stage renal disease) ([facility]    Lymphedema    Myeloma cast nephropathy (HCC)    ESRD (end stage renal disease) ([facility]    Type 2 diabetes mellitus without complication, with long-term current use of insulin ([facility]    Episode of recur

## 2017-10-05 NOTE — PLAN OF CARE
Diabetes/Glucose Control    • Glucose maintained within prescribed range Progressing        DISCHARGE PLANNING    • Discharge to home or other facility with appropriate resources Progressing    Patient plan to d/c home    PAIN - ADULT    • Verbalizes/displ

## 2017-10-05 NOTE — PROGRESS NOTES
GI  PROGRESS NOTE    SUBJECTIVE: relates had worsening of RUQ pain after chicken broth last night. Still with some RUQ pain. Diarrhea sl better.        OBJECTIVE:  Temp:  [97.4 °F (36.3 °C)-97.9 °F (36.6 °C)] 97.9 °F (36.6 °C)  Pulse:  [66-67] 66  Resp: days and RUQ pain x 1 day. CT abd suggestive of \"Equivocal wall thickening of the descending colon although this segment is underdistended\"; tender RUQ. Syncopal episode yest 6 am. Stool negative for C diff PCR. No prior colonoscopy.  HIDA suggestive of b

## 2017-10-05 NOTE — CONSULTS
[facility]    Report of Consultation    Misty May Patient Status:  Inpatient    3/7/1955 MRN [id number]   Location [facility] 4W/SW/SE Attending Sarika Penn MD   Hosp Day # 3 PCP Cintia Elliott MD     Date o failure    • S/P     • S/P tonsillectomy      Past Surgical History:  No date:   No date: OTHER      Comment: T and A  No date: OTHER      Comment: C section x 2  No date: OTHER      Comment: ovarian cyst  No date: OTHER SURGICAL HISTORY rectal suppository 10 mg, 10 mg, Rectal, Daily PRN  •  FLEET ENEMA (FLEET) 7-19 GM/118ML enema 133 mL, 1 enema, Rectal, Once PRN  •  ondansetron HCl (ZOFRAN) injection 4 mg, 4 mg, Intravenous, Q6H PRN  •  morphINE sulfate (PF) 2 MG/ML injection 1 mg, 1 mg, bilaterally. Cardiac: Regular rate and rhythm. No murmur. Abdomen: Patient is very obese. She has a well-healed midline scar without hernia with the patient supine. Some mild nonspecific tenderness upper abdomen.     Extremities: Very obese extremities Multiple myeloma, remission status unspecified (HCC)     Acute renal failure, unspecified acute renal failure type ([facility]     ESRD (end stage renal disease) ([facility]     Type 2 diabetes mellitus without complication, with long-term current use of insulin ([facility] previous abdominal surgery.   We will follow along with you thank you very much      Sandhya Glaser MD [address] [place] [address]  10/5/2017  6:25 PM

## 2017-10-05 NOTE — PROGRESS NOTES
Hematology/Oncology  Progress Note        NAME: Heidi Vivas - ROOM: 821/709-G - MRN: [id number] - Age: 63year old - : 3/7/1955    Subjective:  No acute events overnight.  Still having right sided pain     Medications:  • Heparin Sodium (Porci edema   Skin: No rashes or lesions.     Recent Labs   Lab  10/05/17   0434   RBC  3.55*   HGB  11.1*   HCT  33.1*   MCV  93.5   MCH  31.4   MCHC  33.5   RDW  14.0   WBC  7.2   PLT  126*     Recent Labs   Lab  10/03/17   0445  10/04/17   0441  10/05/17   043

## 2017-10-06 PROCEDURE — 97116 GAIT TRAINING THERAPY: CPT

## 2017-10-06 PROCEDURE — 80053 COMPREHEN METABOLIC PANEL: CPT | Performed by: INTERNAL MEDICINE

## 2017-10-06 PROCEDURE — 85025 COMPLETE CBC W/AUTO DIFF WBC: CPT | Performed by: INTERNAL MEDICINE

## 2017-10-06 PROCEDURE — 82962 GLUCOSE BLOOD TEST: CPT

## 2017-10-06 RX ORDER — ALBUMIN (HUMAN) 12.5 G/50ML
100 SOLUTION INTRAVENOUS
Status: DISCONTINUED | OUTPATIENT
Start: 2017-10-07 | End: 2017-10-10

## 2017-10-06 RX ORDER — DICYCLOMINE HYDROCHLORIDE 10 MG/1
10 CAPSULE ORAL EVERY 6 HOURS
Status: DISCONTINUED | OUTPATIENT
Start: 2017-10-06 | End: 2017-10-10

## 2017-10-06 RX ORDER — HEPARIN SODIUM 1000 [USP'U]/ML
1.5 INJECTION, SOLUTION INTRAVENOUS; SUBCUTANEOUS
Status: DISCONTINUED | OUTPATIENT
Start: 2017-10-07 | End: 2017-10-10

## 2017-10-06 RX ORDER — 0.9 % SODIUM CHLORIDE 0.9 %
VIAL (ML) INJECTION
Status: DISPENSED
Start: 2017-10-06 | End: 2017-10-06

## 2017-10-06 RX ORDER — URSODIOL 300 MG/1
300 CAPSULE ORAL 2 TIMES DAILY
Status: DISCONTINUED | OUTPATIENT
Start: 2017-10-06 | End: 2017-10-10

## 2017-10-06 NOTE — PROGRESS NOTES
Hematology/Oncology  Progress Note        NAME: Mindy Escobedo - ROOM: 336/433-Y - MRN: [id number] - Age: 63year old - : 3/7/1955    Subjective:  Still having abdominal pain and afraid to eat.      Medications:  • Sodium Chloride       • dicyclo murmur. Abdomen: soft, non-tender, non-distended, positive BS. Extremity: no edema   Skin: No rashes or lesions.     Recent Labs   Lab  10/06/17   0520   RBC  3.65*   HGB  11.5*   HCT  34.1*   MCV  93.5   MCH  31.5   MCHC  33.7   RDW  13.8   WBC  5.8

## 2017-10-06 NOTE — PROGRESS NOTES
[facility]    Progress Note    Zarina Salmeron Patient Status:  Inpatient    3/7/1955 MRN [id number]   Location [facility] 4W/SW/SE Attending John Vaca MD   Hosp Day # 4 PCP Angel Barcenas MD     Subjective:     Hansel Laura ordered Bentyl later in morning, I agree.   Will follow closely      Jaqui Read MD FACS  General Surgery  [facility]          Meds:     • Sodium Chloride       • dicyclomine  10 mg Oral Q6H   • ursodiol  300 mg Oral BID   • Miconazole Nitrate

## 2017-10-06 NOTE — PROGRESS NOTES
[facility]    Progress Note    Cem Hung Patient Status:  Inpatient    3/7/1955 MRN [id number]   Location [facility] 4W/SW/SE Attending Suha Barrios MD   Hosp Day # 4 PCP Lay Good MD     Subjective: chronic cholecystitis.                Assessment and Plan:   Principal Problem:    Abdominal pain  Active Problems:    Multiple myeloma (HCC)    HTN (hypertension)    ESRD (end stage renal disease) ([facility]    Lymphedema    [facility]    ES

## 2017-10-06 NOTE — PROGRESS NOTES
GI  PROGRESS NOTE    SUBJECTIVE: hungry, wishes to eat. Still c/o RUQ pain. Diarrhea resolved.        OBJECTIVE:  Temp:  [97.4 °F (36.3 °C)-97.6 °F (36.4 °C)] 97.6 °F (36.4 °C)  Pulse:  [67-90] 90  Resp:  [16-18] 18  BP: (115-135)/(63-76) 115/76  Exam  Ge multiple myeloma presently admitted with 5 days bd discomfort/pain x 5 days, diarrhea x 2 days and RUQ pain x 1 day. CT abd suggestive of \"Equivocal wall thickening of the descending colon although this segment is underdistended\"; No prior colonoscopy.  H

## 2017-10-06 NOTE — PHYSICAL THERAPY NOTE
PHYSICAL THERAPY TREATMENT NOTE - INPATIENT    Room Number: 382/694-W       Presenting Problem: Abdominal pain    Problem List  Principal Problem:    Abdominal pain  Active Problems:    Multiple myeloma (HCC)    HTN (hypertension)    ESRD (end stage renal OBJECTIVE  Precautions: Bed/chair alarm    WEIGHT BEARING RESTRICTION  Weight Bearing Restriction: None                PAIN ASSESSMENT   Ratin  Location: no pain  Management Techniques: Body mechanics; Activity promotion;Repositioning    BALANCE independent     Goal #1   Current Status Supervision    Goal #2 Patient is able to demonstrate transfers Sit to/from Stand at assistance level: supervision with cane - straight     Goal #2  Current Status Supervision    Goal #3 Patient is able to ambulate

## 2017-10-07 PROCEDURE — 82962 GLUCOSE BLOOD TEST: CPT

## 2017-10-07 PROCEDURE — 5A1D70Z PERFORMANCE OF URINARY FILTRATION, INTERMITTENT, LESS THAN 6 HOURS PER DAY: ICD-10-PCS | Performed by: INTERNAL MEDICINE

## 2017-10-07 PROCEDURE — 90935 HEMODIALYSIS ONE EVALUATION: CPT

## 2017-10-07 NOTE — PROGRESS NOTES
[address] Kenna Burciaga Patient Status:  Inpatient    3/7/1955 MRN [id number]   Location [facility] 4W/SW/SE Attending Aleta Moss MD   Hosp Day # 5 PCP Mitchael Haley, MD Glenna Crigler is a 63 yea marrow failure type [facility])         Date Noted: 06/26/2017      Obesity, Class II, BMI 35-39.9, with comorbidity         Date Noted: 06/26/2017      Myeloma cast nephropathy [facility])         Date Noted: 06/26/2017      Depression, unspecified depression type major depressive disorder, unspecified depression episode severity ([facility]    Dialysis AV fistula malfunction (HCC)      Blood pressure 128/62, pulse 74, temperature (!) 97 °F (36.1 °C), temperature source Axillary, resp.  rate 18, height 67\", weight (!) 323 she has increased her oral intake. Ultrasound of gallbladder indicates sludge and HIDA scan indicates biliary dyskinesia.   Patient has been started on Actigall by surgery in an attempt to improve biliary circulation and possibly forestall acute cholecyste

## 2017-10-07 NOTE — PROGRESS NOTES
GI  PROGRESS NOTE    SUBJECTIVE: tolerating diet; relates abd pain much improved but persists;        OBJECTIVE:  Temp:  [97 °F (36.1 °C)-98.1 °F (36.7 °C)] 98.1 °F (36.7 °C)  Pulse:  [64-75] 64  Resp:  [18] 18  BP: (124-128)/(47-64) 124/47  Exam  Gen: N underdistended\"; No prior colonoscopy. HIDA suggestive of biliary dyskinesia. Recalls a \"hard fall\" on abdomen 1 mo ago. Appears improved overall. Mild RUQ pain persists. PLAN: will follow.      Dacia Xie MD  [facility]enter[facility]   _______________

## 2017-10-08 PROCEDURE — 82962 GLUCOSE BLOOD TEST: CPT

## 2017-10-08 NOTE — PROGRESS NOTES
[facility]    Progress Note    Zarina Sees Patient Status:  Inpatient    3/7/1955 MRN [id number]   Location [facility] 4W/SW/SE Attending John Vaca MD   Hosp Day # 6 PCP Angel Barcenas MD            Subjective 10/06/17   0520   GLU  95   BUN  31*   CREATSERUM  4.66*   GFRAA  12*   GFRNAA  10*   CA  9.2   ALB  3.1*   NA  135*   K  4.0   CL  95   CO2  27   ALKPHO  53   AST  13*   ALT  8*   BILT  0.9   TP  6.7                         Assessment and Plan:       Abd

## 2017-10-08 NOTE — PROGRESS NOTES
GI  PROGRESS NOTE    SUBJECTIVE: tolerating diet; relates abd pain much improved. Being careful with diet.          OBJECTIVE:  Temp:  [97.5 °F (36.4 °C)-97.8 °F (36.6 °C)] 97.8 °F (36.6 °C)  Pulse:  [70-84] 83  Resp:  [16-19] 18  BP: ()/(64-73) 108 \"hard fall\" on abdomen 1 mo ago. Appears improved overall. Mild RUQ pain persists. Wants to avoid surg. PLAN: recommend colonoscopy after recovery. please call us as needed.      Leanna Castleman, MD  [facility]enter[facility]   _______________________________

## 2017-10-08 NOTE — PROGRESS NOTES
[facility]    Progress Note    [place] Lsieth Patient Status:  Inpatient    3/7/1955 MRN [id number]   Location [facility] 4W/SW/SE Attending Yanet Villafana MD   Hosp Day # 5 PCP Delanna Holter, MD       Subjective:   Sury Rios GLU  98  95   BUN  47*  31*   CREATSERUM  6.06*  4.66*   GFRAA  9*  12*   GFRNAA  7*  10*   CA  9.0  9.2   NA  134*  135*   K  4.2  4.0   CL  96  95   CO2  26  27                    Johnny Hernandes MD  10/5/2017

## 2017-10-08 NOTE — PROGRESS NOTES
[facility] - [facility]    Progress Note    Inna Smiley Patient Status:  Inpatient    3/7/1955 MRN [id number]   Location [facility] 4W/SW/SE Attending Garland Hodgkin, MD   Hosp Day # 5 PCP Carol Massey MD            Subjective BUN  47*  31*   CREATSERUM  6.06*  4.66*   GFRAA  9*  12*   GFRNAA  7*  10*   CA  9.0  9.2   ALB   --   3.1*   NA  134*  135*   K  4.2  4.0   CL  96  95   CO2  26  27   ALKPHO   --   53   AST   --   13*   ALT   --   8*   BILT   --   0.9   TP   --   6.7

## 2017-10-08 NOTE — PROGRESS NOTES
[address] Kenna Burciaga Patient Status:  Inpatient    3/7/1955 MRN [id number]   Location [facility] 4W/SW/SE Attending Evelina Parker MD   Hosp Day # 6 PCP MD Alejandra Bloom is a 63 yea marrow failure type [facility])         Date Noted: 06/26/2017      Obesity, Class II, BMI 35-39.9, with comorbidity         Date Noted: 06/26/2017      Myeloma cast nephropathy [facility])         Date Noted: 06/26/2017      Depression, unspecified depression type major depressive disorder, unspecified depression episode severity ([facility] [address]    Dialysis AV fistula malfunction (HCC)      Blood pressure 120/66, pulse 70, temperature (!) 97.5 °F (36.4 °C), temperature source Oral, resp.  rate 19, height 67\", weight (!) 327 l her biliary dyskinesia conservatively  Will continue to observe for improvement  Decision to recommend surgery has not been made at this time).

## 2017-10-09 ENCOUNTER — APPOINTMENT (OUTPATIENT)
Dept: GENERAL RADIOLOGY | Facility: HOSPITAL | Age: 62
DRG: 391 | End: 2017-10-09
Attending: INTERNAL MEDICINE
Payer: COMMERCIAL

## 2017-10-09 PROCEDURE — 85025 COMPLETE CBC W/AUTO DIFF WBC: CPT | Performed by: INTERNAL MEDICINE

## 2017-10-09 PROCEDURE — 80053 COMPREHEN METABOLIC PANEL: CPT | Performed by: INTERNAL MEDICINE

## 2017-10-09 PROCEDURE — 82962 GLUCOSE BLOOD TEST: CPT

## 2017-10-09 PROCEDURE — 85007 BL SMEAR W/DIFF WBC COUNT: CPT | Performed by: INTERNAL MEDICINE

## 2017-10-09 PROCEDURE — 85027 COMPLETE CBC AUTOMATED: CPT | Performed by: INTERNAL MEDICINE

## 2017-10-09 PROCEDURE — 71100 X-RAY EXAM RIBS UNI 2 VIEWS: CPT | Performed by: INTERNAL MEDICINE

## 2017-10-09 PROCEDURE — 97110 THERAPEUTIC EXERCISES: CPT

## 2017-10-09 PROCEDURE — 85060 BLOOD SMEAR INTERPRETATION: CPT | Performed by: INTERNAL MEDICINE

## 2017-10-09 PROCEDURE — 97116 GAIT TRAINING THERAPY: CPT

## 2017-10-09 RX ORDER — LIDOCAINE 50 MG/G
1 PATCH TOPICAL EVERY 24 HOURS
Status: DISCONTINUED | OUTPATIENT
Start: 2017-10-09 | End: 2017-10-10

## 2017-10-09 NOTE — PHYSICAL THERAPY NOTE
PHYSICAL THERAPY TREATMENT NOTE - INPATIENT    Room Number: 955/510-C       Presenting Problem: Abdominal pain    Problem List  Principal Problem:    Abdominal pain  Active Problems:    Multiple myeloma (HCC)    HTN (hypertension)    ESRD (end stage renal mechanics; Activity promotion;Repositioning    BALANCE                                                                                                                     Static Sitting: Good  Dynamic Sitting: Good           Static Standing: Fair +  Dynamic straight     Goal #2  Current Status Min A by son   Goal #3 Patient is able to ambulate 50 feet with assist device: cane - straight at assistance level: supervision   Goal #3   Current Status 150 ft with SC with supervision   Goal #4 Patient will negotiate

## 2017-10-09 NOTE — PAYOR COMM NOTE
Request ID - [id number] - APPROVED 10/2/17 - 10/6/17- REQUESTING ADDITIONAL DAYS    10/6/17    Subjective:      Constitutional: Negative for chills and fever. Did not require any pain medications through the night.   However this am is noting continu ([facility] [address]    HTN (hypertension)    ESRD (end stage renal disease) ([facility] [address]    Lymphedema    Myeloma cast nephropathy ([facility] [address]    ESRD (end stage renal disease) ([facility] [address]    Type 2 diabetes mellitus without complication, with long-term current use of insulin ([facility] [address]    Epi distress and ill-appearing. Vital signs: (most recent): Blood pressure 128/62, pulse 74, temperature (!) 97 °F (36.1 °C), temperature source Axillary, resp.  rate 18, height 67\", weight (!) 323 lb 14.4 oz (146.9 kg), SpO2 97 %, not currently breastfeedi shortness of breath. Cardiovascular: Negative for chest pain and palpitations. Gastrointestinal: Positive for abdominal pain.    Genitourinary: Negative for dysuria.         Objective:   Blood pressure 117/58, pulse 69, temperature 98 °F (36.7 °C), tem regular dialysis schedule.

## 2017-10-09 NOTE — PHYSICAL THERAPY NOTE
Pt re-schedule session for afternoon d/t complain tired after walking to bathroom and x-ray. Will see pt later.

## 2017-10-09 NOTE — PROGRESS NOTES
[facility]    Progress Note    Shan Huff Patient Status:  Inpatient    3/7/1955 MRN [id number]   Location [facility] 4W/SW/SE Attending Colin Nelson MD   Hosp Day # 7 PCP Alicia Khalil MD     Subjective: has persistent right upper quadrant abdominal pain/right lower chest wall pain. Patient did have traumatic injury right lower chest wall approximately 1 month ago. Pain seems to be positionally related, but at times is also related to food intake.   She c

## 2017-10-10 VITALS
HEIGHT: 67 IN | OXYGEN SATURATION: 96 % | HEART RATE: 72 BPM | BODY MASS INDEX: 45.99 KG/M2 | RESPIRATION RATE: 18 BRPM | TEMPERATURE: 98 F | DIASTOLIC BLOOD PRESSURE: 56 MMHG | SYSTOLIC BLOOD PRESSURE: 115 MMHG | WEIGHT: 293 LBS

## 2017-10-10 PROCEDURE — 80069 RENAL FUNCTION PANEL: CPT | Performed by: INTERNAL MEDICINE

## 2017-10-10 PROCEDURE — 85007 BL SMEAR W/DIFF WBC COUNT: CPT | Performed by: INTERNAL MEDICINE

## 2017-10-10 PROCEDURE — 85025 COMPLETE CBC W/AUTO DIFF WBC: CPT | Performed by: INTERNAL MEDICINE

## 2017-10-10 PROCEDURE — 85027 COMPLETE CBC AUTOMATED: CPT | Performed by: INTERNAL MEDICINE

## 2017-10-10 PROCEDURE — 90935 HEMODIALYSIS ONE EVALUATION: CPT

## 2017-10-10 PROCEDURE — 82962 GLUCOSE BLOOD TEST: CPT

## 2017-10-10 RX ORDER — LIDOCAINE 50 MG/G
1 PATCH TOPICAL EVERY 24 HOURS
Qty: 30 PATCH | Refills: 3 | Status: ON HOLD | OUTPATIENT
Start: 2017-10-11 | End: 2017-11-29

## 2017-10-10 RX ORDER — DICYCLOMINE HYDROCHLORIDE 10 MG/1
10 CAPSULE ORAL EVERY 6 HOURS
Qty: 30 CAPSULE | Refills: 1 | Status: SHIPPED | OUTPATIENT
Start: 2017-10-10 | End: 2018-03-06

## 2017-10-10 NOTE — DISCHARGE SUMMARY
[facility]    Discharge Summary    Keyana Reid Patient Status:  Inpatient    3/7/1955 MRN [id number]   Location [facility] 4W/SW/SE Attending Delaney Moser MD   Hosp Day # 8 PCP Makeda Reyes MD     Date of Adm showed gallbladder/biliary sludge without distinct findings of cholecystitis. Ultrasound did not reveal any gallstones. HIDA scan did show visualization of gallbladder with diminished ejection fraction of around 20-24%.   Patient was seen in consultation Instructions Prescription details   ACCU-CHEK AFIA SMARTVIEW w/Device Kit      1 strip by Does not apply route 2 (two) times daily.    Quantity:  1 kit  Refills:  6     Glucose Blood Strp  Commonly known as:  ACCU-CHEK DEBORAH PLUS      1 each by Other route

## 2017-10-10 NOTE — PROGRESS NOTES
[facility]    Progress Note    Krystyna Gutierrez Patient Status:  Inpatient    3/7/1955 MRN [id number]   Location [facility] 4W/SW/SE Attending Elizabeth Mark MD   Hosp Day # 7 PCP Freddy Wong MD            Subjective RDW  14.0   WBC  9.1   PLT  179       Lab Results  Component Value Date   INR 1.1 07/17/2017       Recent Labs   Lab  10/09/17   1059   GLU  228*   BUN  52*   CREATSERUM  6.33*   GFRAA  8*   GFRNAA  7*   CA  9.5   ALB  3.3*   NA  135*   K  4.0   CL  99

## 2017-10-10 NOTE — PROGRESS NOTES
[facility]    Progress Note    Charo Mayer Patient Status:  Inpatient    3/7/1955 MRN [id number]   Location [facility] 4W/SW/SE Attending Deisy Vickers MD   Hosp Day # 8 PCP Perry Hyde MD       Subjective:   Suellen Chwe (cpt=71100)    Result Date: 10/9/2017  CONCLUSION:  1. Old healed right  9th rib fracture deformity. 2. No acute fracture dislocation.                Alison Bryant MD  10/10/2017

## 2017-10-10 NOTE — PROGRESS NOTES
[facility]    Progress Note    Jesusra Essex Patient Status:  Inpatient    3/7/1955 MRN [id number]   Location [facility] 4W/SW/SE Attending Kendall Dandy, MD   Hosp Day # 8 PCP Valeria Teran MD            Subjective CO2  23  23   ALKPHO  51   --    AST  16   --    ALT  9*   --    BILT  0.5   --    TP  6.8   --              Xr Ribs, Unilateral (2 Views), Right (cpt=71100)    Result Date: 10/9/2017  CONCLUSION:  1. Old healed right  9th rib fracture deformity.  2. No a

## 2017-10-10 NOTE — PROGRESS NOTES
[facility]    Progress Note    Shan Huff Patient Status:  Inpatient    3/7/1955 MRN [id number]   Location [facility] 4W/SW/SE Attending Colin Nelson MD   Hosp Day # 8 PCP Alicia Khalil MD     Subjective: and Plan:   Principal Problem:    Abdominal pain  Active Problems:    Multiple myeloma (HCC)    HTN (hypertension)    ESRD (end stage renal disease) (HCC)    Lymphedema    Myeloma cast nephropathy ([facility] [address]    ESRD (end stage renal disease) (HCC)    Type 2 juhi

## 2017-10-11 ENCOUNTER — TELEPHONE (OUTPATIENT)
Dept: MEDSURG UNIT | Facility: HOSPITAL | Age: 62
End: 2017-10-11

## 2017-11-08 PROCEDURE — 83883 ASSAY NEPHELOMETRY NOT SPEC: CPT | Performed by: NURSE PRACTITIONER

## 2017-11-08 PROCEDURE — 86334 IMMUNOFIX E-PHORESIS SERUM: CPT | Performed by: NURSE PRACTITIONER

## 2017-11-08 PROCEDURE — 84165 PROTEIN E-PHORESIS SERUM: CPT | Performed by: NURSE PRACTITIONER

## 2017-11-27 PROBLEM — K82.9 GALLBLADDER PAIN: Status: ACTIVE | Noted: 2017-11-27

## 2017-11-27 PROBLEM — D61.9 ANEMIA DUE TO BONE MARROW FAILURE, UNSPECIFIED BONE MARROW FAILURE TYPE (HCC): Status: ACTIVE | Noted: 2017-11-27

## 2017-11-27 PROBLEM — C90.00 MYELOMA CAST NEPHROPATHY (HCC): Status: ACTIVE | Noted: 2017-11-27

## 2017-11-27 PROBLEM — N08 MYELOMA CAST NEPHROPATHY: Status: ACTIVE | Noted: 2017-11-27

## 2017-11-27 PROBLEM — C90.00: Status: ACTIVE | Noted: 2017-11-27

## 2017-11-27 PROBLEM — C90.01 MULTIPLE MYELOMA IN REMISSION (HCC): Status: ACTIVE | Noted: 2017-11-27

## 2017-11-27 PROBLEM — N18.6 ESRD (END STAGE RENAL DISEASE) (HCC): Status: ACTIVE | Noted: 2017-11-27

## 2017-11-27 PROBLEM — N08 MYELOMA CAST NEPHROPATHY (HCC): Status: ACTIVE | Noted: 2017-11-27

## 2017-11-27 PROBLEM — N08: Status: ACTIVE | Noted: 2017-11-27

## 2017-11-27 PROBLEM — C90.00 MYELOMA CAST NEPHROPATHY: Status: ACTIVE | Noted: 2017-11-27

## 2017-11-27 PROCEDURE — 83883 ASSAY NEPHELOMETRY NOT SPEC: CPT | Performed by: INTERNAL MEDICINE

## 2017-11-27 PROCEDURE — 84165 PROTEIN E-PHORESIS SERUM: CPT | Performed by: INTERNAL MEDICINE

## 2017-11-27 PROCEDURE — 86334 IMMUNOFIX E-PHORESIS SERUM: CPT | Performed by: INTERNAL MEDICINE

## 2017-11-29 ENCOUNTER — HOSPITAL ENCOUNTER (OUTPATIENT)
Dept: INTERVENTIONAL RADIOLOGY/VASCULAR | Facility: HOSPITAL | Age: 62
Discharge: HOME | End: 2017-11-29
Attending: INTERNAL MEDICINE | Admitting: INTERNAL MEDICINE
Payer: COMMERCIAL

## 2017-11-29 VITALS
RESPIRATION RATE: 12 BRPM | OXYGEN SATURATION: 97 % | DIASTOLIC BLOOD PRESSURE: 30 MMHG | HEART RATE: 73 BPM | SYSTOLIC BLOOD PRESSURE: 126 MMHG

## 2017-11-29 DIAGNOSIS — N18.6 ESRD (END STAGE RENAL DISEASE) (HCC): ICD-10-CM

## 2017-11-29 PROCEDURE — 36589 REMOVAL TUNNELED CV CATH: CPT

## 2017-11-29 PROCEDURE — 99152 MOD SED SAME PHYS/QHP 5/>YRS: CPT

## 2017-11-29 PROCEDURE — 05PYX3Z REMOVAL OF INFUSION DEVICE FROM UPPER VEIN, EXTERNAL APPROACH: ICD-10-PCS | Performed by: RADIOLOGY

## 2017-11-29 RX ORDER — SODIUM CHLORIDE 9 MG/ML
INJECTION, SOLUTION INTRAVENOUS
Status: DISCONTINUED
Start: 2017-11-29 | End: 2017-11-29

## 2017-11-29 RX ORDER — MIDAZOLAM HYDROCHLORIDE 1 MG/ML
INJECTION INTRAMUSCULAR; INTRAVENOUS
Status: COMPLETED
Start: 2017-11-29 | End: 2017-11-29

## 2017-11-29 RX ORDER — LIDOCAINE HYDROCHLORIDE 20 MG/ML
INJECTION, SOLUTION EPIDURAL; INFILTRATION; INTRACAUDAL; PERINEURAL
Status: COMPLETED
Start: 2017-11-29 | End: 2017-11-29

## 2017-11-29 NOTE — PROCEDURES
[facility] - [facility]  Procedure Note    Alanis Parent Patient Status:  Outpatient in a Bed    3/7/1955 MRN [id number]   Location [facility] Attending Yao Pérez MD   Hosp Day # 0 PCP Alec Worrell MD

## 2017-12-26 PROCEDURE — 83883 ASSAY NEPHELOMETRY NOT SPEC: CPT | Performed by: INTERNAL MEDICINE

## 2017-12-26 PROCEDURE — 84165 PROTEIN E-PHORESIS SERUM: CPT | Performed by: INTERNAL MEDICINE

## 2017-12-26 PROCEDURE — 86334 IMMUNOFIX E-PHORESIS SERUM: CPT | Performed by: INTERNAL MEDICINE

## 2018-01-23 PROCEDURE — 84165 PROTEIN E-PHORESIS SERUM: CPT | Performed by: INTERNAL MEDICINE

## 2018-01-23 PROCEDURE — 83883 ASSAY NEPHELOMETRY NOT SPEC: CPT | Performed by: INTERNAL MEDICINE

## 2018-01-23 PROCEDURE — 86334 IMMUNOFIX E-PHORESIS SERUM: CPT | Performed by: INTERNAL MEDICINE

## 2018-02-12 PROCEDURE — 84165 PROTEIN E-PHORESIS SERUM: CPT | Performed by: INTERNAL MEDICINE

## 2018-02-12 PROCEDURE — 83883 ASSAY NEPHELOMETRY NOT SPEC: CPT | Performed by: INTERNAL MEDICINE

## 2018-02-12 PROCEDURE — 86334 IMMUNOFIX E-PHORESIS SERUM: CPT | Performed by: INTERNAL MEDICINE

## 2018-02-19 ENCOUNTER — HOSPITAL ENCOUNTER (OUTPATIENT)
Facility: HOSPITAL | Age: 63
Setting detail: HOSPITAL OUTPATIENT SURGERY
Discharge: HOME | End: 2018-02-19
Attending: INTERNAL MEDICINE | Admitting: INTERNAL MEDICINE
Payer: COMMERCIAL

## 2018-02-19 ENCOUNTER — SURGERY (OUTPATIENT)
Age: 63
End: 2018-02-19

## 2018-02-19 ENCOUNTER — ANESTHESIA EVENT (OUTPATIENT)
Dept: ENDOSCOPY | Facility: HOSPITAL | Age: 63
End: 2018-02-19
Payer: COMMERCIAL

## 2018-02-19 ENCOUNTER — ANESTHESIA (OUTPATIENT)
Dept: ENDOSCOPY | Facility: HOSPITAL | Age: 63
End: 2018-02-19
Payer: COMMERCIAL

## 2018-02-19 DIAGNOSIS — Z12.11 COLON CANCER SCREENING: ICD-10-CM

## 2018-02-19 DIAGNOSIS — R10.9 ABDOMINAL PAIN, UNSPECIFIED ABDOMINAL LOCATION: ICD-10-CM

## 2018-02-19 LAB
GLUCOSE BLDC GLUCOMTR-MCNC: 82 MG/DL (ref 70–99)
POTASSIUM SERPL-SCNC: 4 MMOL/L (ref 3.3–5.1)

## 2018-02-19 PROCEDURE — 0DB98ZX EXCISION OF DUODENUM, VIA NATURAL OR ARTIFICIAL OPENING ENDOSCOPIC, DIAGNOSTIC: ICD-10-PCS | Performed by: INTERNAL MEDICINE

## 2018-02-19 PROCEDURE — 0DBE8ZX EXCISION OF LARGE INTESTINE, VIA NATURAL OR ARTIFICIAL OPENING ENDOSCOPIC, DIAGNOSTIC: ICD-10-PCS | Performed by: INTERNAL MEDICINE

## 2018-02-19 PROCEDURE — 82962 GLUCOSE BLOOD TEST: CPT

## 2018-02-19 PROCEDURE — 84132 ASSAY OF SERUM POTASSIUM: CPT | Performed by: INTERNAL MEDICINE

## 2018-02-19 PROCEDURE — 0DBC8ZX EXCISION OF ILEOCECAL VALVE, VIA NATURAL OR ARTIFICIAL OPENING ENDOSCOPIC, DIAGNOSTIC: ICD-10-PCS | Performed by: INTERNAL MEDICINE

## 2018-02-19 PROCEDURE — 88305 TISSUE EXAM BY PATHOLOGIST: CPT | Performed by: INTERNAL MEDICINE

## 2018-02-19 PROCEDURE — 88312 SPECIAL STAINS GROUP 1: CPT | Performed by: INTERNAL MEDICINE

## 2018-02-19 PROCEDURE — 0DB68ZX EXCISION OF STOMACH, VIA NATURAL OR ARTIFICIAL OPENING ENDOSCOPIC, DIAGNOSTIC: ICD-10-PCS | Performed by: INTERNAL MEDICINE

## 2018-02-19 RX ORDER — HALOPERIDOL 5 MG/ML
0.25 INJECTION INTRAMUSCULAR ONCE AS NEEDED
Status: DISCONTINUED | OUTPATIENT
Start: 2018-02-19 | End: 2018-02-19

## 2018-02-19 RX ORDER — ONDANSETRON 2 MG/ML
4 INJECTION INTRAMUSCULAR; INTRAVENOUS ONCE AS NEEDED
Status: DISCONTINUED | OUTPATIENT
Start: 2018-02-19 | End: 2018-02-19

## 2018-02-19 RX ORDER — DEXTROSE MONOHYDRATE 25 G/50ML
50 INJECTION, SOLUTION INTRAVENOUS
Status: DISCONTINUED | OUTPATIENT
Start: 2018-02-19 | End: 2018-02-19

## 2018-02-19 RX ORDER — MORPHINE SULFATE 10 MG/ML
6 INJECTION, SOLUTION INTRAMUSCULAR; INTRAVENOUS EVERY 10 MIN PRN
Status: DISCONTINUED | OUTPATIENT
Start: 2018-02-19 | End: 2018-02-19

## 2018-02-19 RX ORDER — HYDROCODONE BITARTRATE AND ACETAMINOPHEN 5; 325 MG/1; MG/1
1 TABLET ORAL AS NEEDED
Status: DISCONTINUED | OUTPATIENT
Start: 2018-02-19 | End: 2018-02-19

## 2018-02-19 RX ORDER — MORPHINE SULFATE 4 MG/ML
2 INJECTION, SOLUTION INTRAMUSCULAR; INTRAVENOUS EVERY 10 MIN PRN
Status: DISCONTINUED | OUTPATIENT
Start: 2018-02-19 | End: 2018-02-19

## 2018-02-19 RX ORDER — SODIUM CHLORIDE 9 MG/ML
INJECTION, SOLUTION INTRAVENOUS CONTINUOUS
Status: DISCONTINUED | OUTPATIENT
Start: 2018-02-19 | End: 2018-02-19

## 2018-02-19 RX ORDER — MORPHINE SULFATE 4 MG/ML
4 INJECTION, SOLUTION INTRAMUSCULAR; INTRAVENOUS EVERY 10 MIN PRN
Status: DISCONTINUED | OUTPATIENT
Start: 2018-02-19 | End: 2018-02-19

## 2018-02-19 RX ORDER — SODIUM CHLORIDE, SODIUM LACTATE, POTASSIUM CHLORIDE, CALCIUM CHLORIDE 600; 310; 30; 20 MG/100ML; MG/100ML; MG/100ML; MG/100ML
INJECTION, SOLUTION INTRAVENOUS CONTINUOUS
Status: DISCONTINUED | OUTPATIENT
Start: 2018-02-19 | End: 2018-02-19

## 2018-02-19 RX ORDER — NALOXONE HYDROCHLORIDE 0.4 MG/ML
80 INJECTION, SOLUTION INTRAMUSCULAR; INTRAVENOUS; SUBCUTANEOUS AS NEEDED
Status: DISCONTINUED | OUTPATIENT
Start: 2018-02-19 | End: 2018-02-19

## 2018-02-19 RX ORDER — HYDROCODONE BITARTRATE AND ACETAMINOPHEN 5; 325 MG/1; MG/1
2 TABLET ORAL AS NEEDED
Status: DISCONTINUED | OUTPATIENT
Start: 2018-02-19 | End: 2018-02-19

## 2018-02-19 RX ADMIN — SODIUM CHLORIDE: 9 INJECTION, SOLUTION INTRAVENOUS at 08:57:00

## 2018-02-19 NOTE — PROCEDURES
ESOPHAGOGASTRODUODENOSCOPY AND COLONOSCOPY REPORT    Patient Name:  Manoj Toth, Tressa Neofonie Record #: [id number]  YOB: 1955  Date of Procedure: 2/19/2018    Referring physician: Jorge Blandon MD    Surgeon:  Aram Barrett MD minutes. Findings: There was a 2mm sessile polyp at ileocecal valve. Polyp was removed with a cold biopsy forceps. Resection and retrieval were complete.  There was otherwise no evidence of ulcerations, colitis, vascular anomalies, diverticulosis, or m

## 2018-02-19 NOTE — ANESTHESIA PREPROCEDURE EVALUATION
Anesthesia PreOp Note    HPI:     Ranulfo Mckeon is a 63year old female who presents for preoperative consultation requested by: Aamir Lassiter MD    Date of Surgery: 2/19/2018    Procedure(s):  COLONOSCOPY  ESOPHAGOGASTRODUODENOSCOPY (EGD)  Krissy Noted: 06/26/2017      Depression, unspecified depression type         Date Noted: 06/26/2017      Tearfulness         Date Noted: 06/26/2017      Anxiety         Date Noted: 06/26/2017      Rash         Date Noted: 06/15/2017      Anemia         Date Note TONSILLECTOMY      Prescriptions Prior to Admission:  TRAMADOL HCL 50 MG Oral Tab TAKE 1 TABLET BY MOUTH EVERY 6 HOURS AS NEEDED Disp: 30 tablet Rfl: 1 2/17/2018   Dicyclomine HCl 20 MG Oral Tab Take 1 tablet (20 mg total) by mouth 4 (four) times daily as No    Drug use: No    Sexual activity: Yes     Other Topics Concern   None on file     Social History Narrative   None on file       Available pre-op labs reviewed.     Lab Results  Component Value Date   WBC 8.30 02/12/2018   RBC 3.74 (L) 02/12/2018   HGB planned.   Kehinde Adan  2/19/2018 8:25 AM

## 2018-02-19 NOTE — H&P
PRE-PROCEDURE UPDATE    HPI: Danica Lopez is a 63year old female. 3/7/1955. Patient presents for a colonoscopy and gastroscopy. Patient with abdominal pain, irregular bowel habits. See clinic note for details.  No prior colonoscopy either (scr procedure, indications, risks, benefits and post-op precautions were  discussed and questions were answered in the pre-operative area.      Rachel Tate MD

## 2018-02-19 NOTE — ANESTHESIA POSTPROCEDURE EVALUATION
Patient: Sheryle Royals    Procedure Summary     Date:  02/19/18 Room / Location:  [address] [place] [address] ENDOSCOPY 05 / [address] [place] [address] ENDOSCOPY    Anesthesia Start:  3168 Anesthesia Stop:      Procedures:       COLONOSCOPY (N/A )      ESOPHAGOGASTRODUODENOSCOPY (EGD) (N/A ) D

## 2018-02-20 VITALS
BODY MASS INDEX: 45.99 KG/M2 | DIASTOLIC BLOOD PRESSURE: 74 MMHG | HEART RATE: 78 BPM | OXYGEN SATURATION: 98 % | SYSTOLIC BLOOD PRESSURE: 116 MMHG | RESPIRATION RATE: 22 BRPM | HEIGHT: 67 IN | WEIGHT: 293 LBS

## 2018-03-06 ENCOUNTER — HOSPITAL ENCOUNTER (OUTPATIENT)
Dept: GENERAL RADIOLOGY | Facility: HOSPITAL | Age: 63
Discharge: HOME | End: 2018-03-06
Attending: INTERNAL MEDICINE
Payer: COMMERCIAL

## 2018-03-06 DIAGNOSIS — R06.00 DYSPNEA, UNSPECIFIED TYPE: ICD-10-CM

## 2018-03-06 PROCEDURE — 71046 X-RAY EXAM CHEST 2 VIEWS: CPT | Performed by: INTERNAL MEDICINE

## 2018-03-07 NOTE — PROGRESS NOTES
Chest x-ray is unremarkable. Await results of stress test.  Continue with inhaler as prescribed yesterday.     Edd Baez M.D.

## 2018-03-13 PROCEDURE — 84165 PROTEIN E-PHORESIS SERUM: CPT | Performed by: INTERNAL MEDICINE

## 2018-03-13 PROCEDURE — 86334 IMMUNOFIX E-PHORESIS SERUM: CPT | Performed by: INTERNAL MEDICINE

## 2018-03-13 PROCEDURE — 83883 ASSAY NEPHELOMETRY NOT SPEC: CPT | Performed by: INTERNAL MEDICINE

## 2018-03-14 PROBLEM — A04.8 HELICOBACTER PYLORI (H. PYLORI) INFECTION: Status: ACTIVE | Noted: 2018-03-14

## 2018-03-22 ENCOUNTER — HOSPITAL ENCOUNTER (OUTPATIENT)
Dept: NUCLEAR MEDICINE | Facility: HOSPITAL | Age: 63
Discharge: HOME | End: 2018-03-22
Attending: INTERNAL MEDICINE
Payer: COMMERCIAL

## 2018-03-22 ENCOUNTER — HOSPITAL ENCOUNTER (OUTPATIENT)
Dept: CV DIAGNOSTICS | Facility: HOSPITAL | Age: 63
Discharge: HOME | End: 2018-03-22
Attending: INTERNAL MEDICINE
Payer: COMMERCIAL

## 2018-03-22 DIAGNOSIS — R06.00 DYSPNEA, UNSPECIFIED TYPE: ICD-10-CM

## 2018-03-22 PROCEDURE — A4216 STERILE WATER/SALINE, 10 ML: HCPCS

## 2018-03-22 PROCEDURE — 93018 CV STRESS TEST I&R ONLY: CPT | Performed by: INTERNAL MEDICINE

## 2018-03-22 PROCEDURE — 78452 HT MUSCLE IMAGE SPECT MULT: CPT | Performed by: INTERNAL MEDICINE

## 2018-03-22 PROCEDURE — 93016 CV STRESS TEST SUPVJ ONLY: CPT | Performed by: INTERNAL MEDICINE

## 2018-03-22 PROCEDURE — 93017 CV STRESS TEST TRACING ONLY: CPT | Performed by: INTERNAL MEDICINE

## 2018-03-22 RX ORDER — 0.9 % SODIUM CHLORIDE 0.9 %
VIAL (ML) INJECTION
Status: COMPLETED
Start: 2018-03-22 | End: 2018-03-22

## 2018-03-22 RX ADMIN — 0.9 % SODIUM CHLORIDE 10 ML: 0.9 % VIAL (ML) INJECTION at 08:52:00

## 2018-03-22 NOTE — PROGRESS NOTES
Stress test neg for reversible ischemia. Previous cxr is neg. I spoke with pt about above. Will continue with current inhaler. Pt has f/u appt in May.   She will come sooner if dyspnea symptons should worsen--may then need further eval.  Pt understands

## 2018-04-10 PROCEDURE — 84165 PROTEIN E-PHORESIS SERUM: CPT | Performed by: INTERNAL MEDICINE

## 2018-04-10 PROCEDURE — 83883 ASSAY NEPHELOMETRY NOT SPEC: CPT | Performed by: INTERNAL MEDICINE

## 2018-04-10 PROCEDURE — 86334 IMMUNOFIX E-PHORESIS SERUM: CPT | Performed by: INTERNAL MEDICINE

## 2018-04-23 ENCOUNTER — HOSPITAL ENCOUNTER (OUTPATIENT)
Dept: RESPIRATORY THERAPY | Facility: HOSPITAL | Age: 63
Discharge: HOME | End: 2018-04-23
Attending: INTERNAL MEDICINE
Payer: COMMERCIAL

## 2018-04-23 DIAGNOSIS — R06.00 DYSPNEA, UNSPECIFIED TYPE: ICD-10-CM

## 2018-04-23 PROCEDURE — 94729 DIFFUSING CAPACITY: CPT | Performed by: INTERNAL MEDICINE

## 2018-04-23 PROCEDURE — 94060 EVALUATION OF WHEEZING: CPT | Performed by: INTERNAL MEDICINE

## 2018-04-23 PROCEDURE — 94726 PLETHYSMOGRAPHY LUNG VOLUMES: CPT | Performed by: INTERNAL MEDICINE

## 2018-04-23 NOTE — PROCEDURES
[facility]    Patient's Name Vito Potts MRN [id number]    3/7/1955 Pulmonologist Megan Lynch MD   Location [facility] PCP Elian Taveras MD     IMPRESSION:    The PFTs are Normal.    No change

## 2018-04-30 PROCEDURE — 84165 PROTEIN E-PHORESIS SERUM: CPT | Performed by: INTERNAL MEDICINE

## 2018-04-30 PROCEDURE — 83883 ASSAY NEPHELOMETRY NOT SPEC: CPT | Performed by: INTERNAL MEDICINE

## 2018-04-30 PROCEDURE — 86334 IMMUNOFIX E-PHORESIS SERUM: CPT | Performed by: INTERNAL MEDICINE

## 2018-06-18 ENCOUNTER — APPOINTMENT (OUTPATIENT)
Dept: GENERAL RADIOLOGY | Facility: HOSPITAL | Age: 63
End: 2018-06-18
Attending: EMERGENCY MEDICINE
Payer: COMMERCIAL

## 2018-06-18 ENCOUNTER — HOSPITAL ENCOUNTER (EMERGENCY)
Facility: HOSPITAL | Age: 63
Discharge: HOME | End: 2018-06-18
Attending: EMERGENCY MEDICINE
Payer: COMMERCIAL

## 2018-06-18 VITALS
HEIGHT: 67 IN | TEMPERATURE: 99 F | BODY MASS INDEX: 45.99 KG/M2 | OXYGEN SATURATION: 93 % | RESPIRATION RATE: 20 BRPM | SYSTOLIC BLOOD PRESSURE: 114 MMHG | WEIGHT: 293 LBS | HEART RATE: 81 BPM | DIASTOLIC BLOOD PRESSURE: 57 MMHG

## 2018-06-18 DIAGNOSIS — S70.01XA CONTUSION OF RIGHT HIP, INITIAL ENCOUNTER: ICD-10-CM

## 2018-06-18 DIAGNOSIS — S39.012A STRAIN OF LUMBAR REGION, INITIAL ENCOUNTER: Primary | ICD-10-CM

## 2018-06-18 PROCEDURE — 72100 X-RAY EXAM L-S SPINE 2/3 VWS: CPT | Performed by: EMERGENCY MEDICINE

## 2018-06-18 PROCEDURE — 99284 EMERGENCY DEPT VISIT MOD MDM: CPT

## 2018-06-18 PROCEDURE — 73502 X-RAY EXAM HIP UNI 2-3 VIEWS: CPT | Performed by: EMERGENCY MEDICINE

## 2018-06-18 RX ORDER — TRAMADOL HYDROCHLORIDE 50 MG/1
50 TABLET ORAL EVERY 12 HOURS PRN
Qty: 14 TABLET | Refills: 0 | Status: SHIPPED | OUTPATIENT
Start: 2018-06-18 | End: 2018-06-25

## 2018-06-18 RX ORDER — ACETAMINOPHEN 500 MG
1000 TABLET ORAL ONCE
Status: COMPLETED | OUTPATIENT
Start: 2018-06-18 | End: 2018-06-18

## 2018-06-18 RX ORDER — CYCLOBENZAPRINE HCL 10 MG
10 TABLET ORAL EVERY 12 HOURS PRN
Qty: 10 TABLET | Refills: 0 | Status: SHIPPED | OUTPATIENT
Start: 2018-06-18 | End: 2018-06-25

## 2018-06-18 RX ORDER — TRAMADOL HYDROCHLORIDE 50 MG/1
50 TABLET ORAL ONCE
Status: COMPLETED | OUTPATIENT
Start: 2018-06-18 | End: 2018-06-18

## 2018-06-18 NOTE — ED NOTES
Patient slipped and fell on some water at home today. Now complaining of lower back and left hip pain.

## 2018-06-18 NOTE — ED INITIAL ASSESSMENT (HPI)
Pt reports a slip and fall on the floor and report lower back and left hip pain. Pt denies LOC or head trauma. Pt reports being at [facility] and left the ed to come here.  Ice pack given to pt for pain

## 2018-06-19 NOTE — ED PROVIDER NOTES
Patient Seen in: [facility] Emergency Department    History   Patient presents with:  Fall (musculoskeletal, neurologic)    Stated Complaint: fall    HPI    Patient complains of mechanical fall that occurred  Today at home slipped walking into emerson Fumarate (SYMBICORT IN),  Inhale into the lungs. Pantoprazole Sodium 40 MG Oral Tab EC,  Take 1 tablet (40 mg total) by mouth 2 (two) times daily before meals.    insulin glargine (BASAGLAR KWIKPEN) 100 UNIT/ML Subcutaneous Solution Pen-injector,  Inject tender over r prox lat hip and r lower lumbar region  NEURO: alert and oiented *3, 2-12 intact, no focal deficit noted  SKIN: good skin turgor, no  rashes  PSYCH: calm, cooperative,    Differential includes:contusion vs. fx of hip with lumbar strain    ED Disp-14 tablet, R-0    !! - Potential duplicate medications found. Please discuss with provider.                       Disposition and Plan     Clinical Impression:  Strain of lumbar region, initial encounter  (primary encounter diagnosis)    Disposition:

## 2018-07-12 PROCEDURE — 86334 IMMUNOFIX E-PHORESIS SERUM: CPT | Performed by: INTERNAL MEDICINE

## 2018-07-12 PROCEDURE — 83883 ASSAY NEPHELOMETRY NOT SPEC: CPT | Performed by: INTERNAL MEDICINE

## 2018-07-12 PROCEDURE — 84165 PROTEIN E-PHORESIS SERUM: CPT | Performed by: INTERNAL MEDICINE

## 2018-10-09 PROCEDURE — 86334 IMMUNOFIX E-PHORESIS SERUM: CPT | Performed by: INTERNAL MEDICINE

## 2018-10-09 PROCEDURE — 84165 PROTEIN E-PHORESIS SERUM: CPT | Performed by: INTERNAL MEDICINE

## 2018-10-09 PROCEDURE — 83883 ASSAY NEPHELOMETRY NOT SPEC: CPT | Performed by: INTERNAL MEDICINE

## 2018-10-11 PROCEDURE — 82656 EL-1 FECAL QUAL/SEMIQ: CPT | Performed by: INTERNAL MEDICINE

## 2018-10-11 PROCEDURE — 87338 HPYLORI STOOL AG IA: CPT | Performed by: INTERNAL MEDICINE

## 2018-10-11 PROCEDURE — 82705 FATS/LIPIDS FECES QUAL: CPT | Performed by: INTERNAL MEDICINE

## 2018-11-29 PROCEDURE — 83993 ASSAY FOR CALPROTECTIN FECAL: CPT | Performed by: INTERNAL MEDICINE

## 2018-11-29 PROCEDURE — 82656 EL-1 FECAL QUAL/SEMIQ: CPT | Performed by: INTERNAL MEDICINE

## 2019-02-14 PROCEDURE — 81256 HFE GENE: CPT | Performed by: INTERNAL MEDICINE

## 2019-02-14 PROCEDURE — 36415 COLL VENOUS BLD VENIPUNCTURE: CPT | Performed by: INTERNAL MEDICINE

## 2019-02-18 ENCOUNTER — HOSPITAL ENCOUNTER (OUTPATIENT)
Dept: INTERVENTIONAL RADIOLOGY/VASCULAR | Facility: HOSPITAL | Age: 64
Discharge: HOME | End: 2019-02-18
Attending: INTERNAL MEDICINE | Admitting: INTERNAL MEDICINE
Payer: COMMERCIAL

## 2019-02-18 VITALS
BODY MASS INDEX: 52 KG/M2 | SYSTOLIC BLOOD PRESSURE: 121 MMHG | OXYGEN SATURATION: 97 % | DIASTOLIC BLOOD PRESSURE: 75 MMHG | HEART RATE: 79 BPM | RESPIRATION RATE: 14 BRPM | WEIGHT: 293 LBS

## 2019-02-18 DIAGNOSIS — N18.6 ESRD (END STAGE RENAL DISEASE) (HCC): ICD-10-CM

## 2019-02-18 LAB
GLUCOSE BLDC GLUCOMTR-MCNC: 282 MG/DL (ref 70–99)
GLUCOSE BLDC GLUCOMTR-MCNC: 302 MG/DL (ref 70–99)

## 2019-02-18 PROCEDURE — 82962 GLUCOSE BLOOD TEST: CPT

## 2019-02-18 PROCEDURE — 36901 INTRO CATH DIALYSIS CIRCUIT: CPT

## 2019-02-18 PROCEDURE — B51W1ZZ FLUOROSCOPY OF DIALYSIS SHUNT/FISTULA USING LOW OSMOLAR CONTRAST: ICD-10-PCS | Performed by: RADIOLOGY

## 2019-02-18 RX ORDER — MIDAZOLAM HYDROCHLORIDE 1 MG/ML
INJECTION INTRAMUSCULAR; INTRAVENOUS
Status: DISCONTINUED
Start: 2019-02-18 | End: 2019-02-18 | Stop reason: WASHOUT

## 2019-02-18 RX ORDER — INSULIN ASPART 100 [IU]/ML
INJECTION, SOLUTION INTRAVENOUS; SUBCUTANEOUS
Status: DISCONTINUED
Start: 2019-02-18 | End: 2019-02-18

## 2019-02-18 RX ORDER — DEXTROSE MONOHYDRATE 25 G/50ML
50 INJECTION, SOLUTION INTRAVENOUS
Status: DISCONTINUED | OUTPATIENT
Start: 2019-02-18 | End: 2019-02-18

## 2019-02-18 RX ORDER — SODIUM CHLORIDE 9 MG/ML
INJECTION, SOLUTION INTRAVENOUS
Status: COMPLETED
Start: 2019-02-18 | End: 2019-02-18

## 2019-02-18 RX ORDER — DIPHENHYDRAMINE HCL 25 MG
CAPSULE ORAL
Status: DISCONTINUED
Start: 2019-02-18 | End: 2019-02-18

## 2019-02-18 RX ORDER — LIDOCAINE HYDROCHLORIDE 20 MG/ML
INJECTION, SOLUTION EPIDURAL; INFILTRATION; INTRACAUDAL; PERINEURAL
Status: COMPLETED
Start: 2019-02-18 | End: 2019-02-18

## 2019-02-18 RX ORDER — DIPHENHYDRAMINE HCL 25 MG
50 CAPSULE ORAL ONCE
Status: COMPLETED | OUTPATIENT
Start: 2019-02-18 | End: 2019-02-18

## 2019-02-18 RX ORDER — INSULIN ASPART 100 [IU]/ML
INJECTION, SOLUTION INTRAVENOUS; SUBCUTANEOUS ONCE
Status: COMPLETED | OUTPATIENT
Start: 2019-02-18 | End: 2019-02-18

## 2019-02-18 RX ORDER — DEXTROSE MONOHYDRATE 50 MG/ML
INJECTION, SOLUTION INTRAVENOUS CONTINUOUS
Status: DISCONTINUED | OUTPATIENT
Start: 2019-02-18 | End: 2019-02-18

## 2019-02-18 RX ORDER — HEPARIN SODIUM 1000 [USP'U]/ML
INJECTION, SOLUTION INTRAVENOUS; SUBCUTANEOUS
Status: COMPLETED
Start: 2019-02-18 | End: 2019-02-18

## 2019-02-18 RX ORDER — SODIUM CHLORIDE 9 MG/ML
INJECTION, SOLUTION INTRAVENOUS
Status: DISCONTINUED
Start: 2019-02-18 | End: 2019-02-18

## 2019-02-18 RX ADMIN — INSULIN ASPART 10 UNITS: 100 INJECTION, SOLUTION INTRAVENOUS; SUBCUTANEOUS at 14:15:00

## 2019-02-18 RX ADMIN — DIPHENHYDRAMINE HCL 50 MG: 25 MG CAPSULE ORAL at 14:21:00

## 2019-02-18 NOTE — PRE-SEDATION ASSESSMENT
[facility]  IR Pre-Procedure Sedation Assessment    History of snoring or sleep or apnea?    Yes    History of previous problems with anesthesia or sedation  No    Physical Findings:  Neck: nl ROM  CV: RRR  PULM: normal respiratory rate/effo

## 2019-02-18 NOTE — INTERVAL H&P NOTE
The above referenced H&P was reviewed by Brett Banks MD on 2/18/2019, the patient was examined and no significant changes have occurred in the patient's condition since the H&P was performed.   Risks, benefits, alternative treatments and consequences

## 2019-02-18 NOTE — PROCEDURES
[facility]OSP - [facility]  Procedure Note    Vito Potts Patient Status:  Outpatient in a Bed    3/7/1955 MRN [id number]   Location [facility] Attending Sarmad Peraza MD   Hosp Day # 0 PCP Elian Taveras MD

## 2019-02-21 PROCEDURE — 86334 IMMUNOFIX E-PHORESIS SERUM: CPT | Performed by: INTERNAL MEDICINE

## 2019-02-21 PROCEDURE — 84165 PROTEIN E-PHORESIS SERUM: CPT | Performed by: INTERNAL MEDICINE

## 2019-02-21 PROCEDURE — 83883 ASSAY NEPHELOMETRY NOT SPEC: CPT | Performed by: INTERNAL MEDICINE

## 2019-04-18 ENCOUNTER — OFFICE VISIT (OUTPATIENT)
Dept: INTERNAL MEDICINE CLINIC | Facility: CLINIC | Age: 64
End: 2019-04-18
Payer: COMMERCIAL

## 2019-04-18 VITALS
OXYGEN SATURATION: 96 % | DIASTOLIC BLOOD PRESSURE: 70 MMHG | WEIGHT: 293 LBS | SYSTOLIC BLOOD PRESSURE: 120 MMHG | BODY MASS INDEX: 54 KG/M2 | HEART RATE: 95 BPM | TEMPERATURE: 99 F

## 2019-04-18 DIAGNOSIS — Z79.4 TYPE 2 DIABETES MELLITUS WITHOUT COMPLICATION, WITH LONG-TERM CURRENT USE OF INSULIN (HCC): Primary | ICD-10-CM

## 2019-04-18 DIAGNOSIS — N18.6 ESRD (END STAGE RENAL DISEASE) (HCC): ICD-10-CM

## 2019-04-18 DIAGNOSIS — D61.9 ANEMIA DUE TO BONE MARROW FAILURE, UNSPECIFIED BONE MARROW FAILURE TYPE (HCC): ICD-10-CM

## 2019-04-18 DIAGNOSIS — I10 ESSENTIAL HYPERTENSION: ICD-10-CM

## 2019-04-18 DIAGNOSIS — M54.2 NECK PAIN: ICD-10-CM

## 2019-04-18 DIAGNOSIS — C90.01 MULTIPLE MYELOMA IN REMISSION (HCC): ICD-10-CM

## 2019-04-18 DIAGNOSIS — E11.9 TYPE 2 DIABETES MELLITUS WITHOUT COMPLICATION, WITH LONG-TERM CURRENT USE OF INSULIN (HCC): Primary | ICD-10-CM

## 2019-04-18 PROCEDURE — 99213 OFFICE O/P EST LOW 20 MIN: CPT | Performed by: INTERNAL MEDICINE

## 2019-04-18 PROCEDURE — 99212 OFFICE O/P EST SF 10 MIN: CPT | Performed by: INTERNAL MEDICINE

## 2019-04-18 RX ORDER — OMEPRAZOLE 40 MG/1
40 CAPSULE, DELAYED RELEASE ORAL DAILY PRN
Qty: 90 CAPSULE | Refills: 3 | Status: SHIPPED | OUTPATIENT
Start: 2019-04-18 | End: 2019-06-26

## 2019-04-18 RX ORDER — DICYCLOMINE HCL 20 MG
20 TABLET ORAL 2 TIMES DAILY
Qty: 180 TABLET | Refills: 3 | Status: SHIPPED | OUTPATIENT
Start: 2019-04-18 | End: 2020-04-14

## 2019-04-18 RX ORDER — TRAMADOL HYDROCHLORIDE 50 MG/1
TABLET ORAL
Qty: 60 TABLET | Refills: 0 | Status: SHIPPED
Start: 2019-04-18 | End: 2019-06-26

## 2019-04-18 NOTE — PROGRESS NOTES
HPI:    Patient ID: Danica Lopez is a 60year old female. Patient presents for follow-up. She has been tolerating dialysis well but notes extreme fatigue and weakness on days of dialysis that recovers by the next day.   She continues to note i strip Rfl: 3   Blood Glucose Monitoring Suppl (ACCU-CHEK AFIA SMARTVIEW) W/DEVICE Does not apply Kit 1 strip by Does not apply route 2 (two) times daily.  Disp: 1 kit Rfl: 6     Allergies:  Iodine (Topical)        HIVES  Radiology Contrast *    HIVES  Sulfa However she is able to work through this. She does have anemia secondary to renal failure and does receive erythropoietin shots by nephrology    Multiple myeloma is currently in remission.   Patient is noting occasional tingling sensation in lower extrem

## 2019-06-26 ENCOUNTER — OFFICE VISIT (OUTPATIENT)
Dept: INTERNAL MEDICINE CLINIC | Facility: CLINIC | Age: 64
End: 2019-06-26
Payer: COMMERCIAL

## 2019-06-26 ENCOUNTER — LAB ENCOUNTER (OUTPATIENT)
Dept: LAB | Age: 64
End: 2019-06-26
Attending: INTERNAL MEDICINE
Payer: COMMERCIAL

## 2019-06-26 VITALS
SYSTOLIC BLOOD PRESSURE: 120 MMHG | HEART RATE: 74 BPM | OXYGEN SATURATION: 93 % | HEIGHT: 67 IN | BODY MASS INDEX: 45.99 KG/M2 | TEMPERATURE: 98 F | DIASTOLIC BLOOD PRESSURE: 60 MMHG | WEIGHT: 293 LBS

## 2019-06-26 DIAGNOSIS — N18.6 ESRD (END STAGE RENAL DISEASE) (HCC): ICD-10-CM

## 2019-06-26 DIAGNOSIS — E11.9 TYPE 2 DIABETES MELLITUS WITHOUT COMPLICATION, WITH LONG-TERM CURRENT USE OF INSULIN (HCC): ICD-10-CM

## 2019-06-26 DIAGNOSIS — R10.9 ABDOMINAL PAIN, UNSPECIFIED ABDOMINAL LOCATION: Primary | ICD-10-CM

## 2019-06-26 DIAGNOSIS — Z79.4 TYPE 2 DIABETES MELLITUS WITHOUT COMPLICATION, WITH LONG-TERM CURRENT USE OF INSULIN (HCC): ICD-10-CM

## 2019-06-26 DIAGNOSIS — I10 ESSENTIAL HYPERTENSION: ICD-10-CM

## 2019-06-26 DIAGNOSIS — C90.01 MULTIPLE MYELOMA IN REMISSION (HCC): ICD-10-CM

## 2019-06-26 DIAGNOSIS — R10.9 ABDOMINAL PAIN, UNSPECIFIED ABDOMINAL LOCATION: ICD-10-CM

## 2019-06-26 PROCEDURE — 83735 ASSAY OF MAGNESIUM: CPT

## 2019-06-26 PROCEDURE — 99212 OFFICE O/P EST SF 10 MIN: CPT | Performed by: INTERNAL MEDICINE

## 2019-06-26 PROCEDURE — 85025 COMPLETE CBC W/AUTO DIFF WBC: CPT

## 2019-06-26 PROCEDURE — 80053 COMPREHEN METABOLIC PANEL: CPT

## 2019-06-26 PROCEDURE — 36415 COLL VENOUS BLD VENIPUNCTURE: CPT

## 2019-06-26 PROCEDURE — 83690 ASSAY OF LIPASE: CPT

## 2019-06-26 PROCEDURE — 83036 HEMOGLOBIN GLYCOSYLATED A1C: CPT

## 2019-06-26 PROCEDURE — 84439 ASSAY OF FREE THYROXINE: CPT

## 2019-06-26 PROCEDURE — 99214 OFFICE O/P EST MOD 30 MIN: CPT | Performed by: INTERNAL MEDICINE

## 2019-06-26 PROCEDURE — 84443 ASSAY THYROID STIM HORMONE: CPT

## 2019-06-26 RX ORDER — TRAMADOL HYDROCHLORIDE 50 MG/1
TABLET ORAL
Qty: 60 TABLET | Refills: 0 | Status: SHIPPED
Start: 2019-06-26 | End: 2019-08-27

## 2019-06-26 RX ORDER — CYCLOBENZAPRINE HCL 10 MG
10 TABLET ORAL DAILY
Qty: 30 TABLET | Refills: 1 | Status: SHIPPED | OUTPATIENT
Start: 2019-06-26 | End: 2019-07-16

## 2019-06-26 NOTE — PROGRESS NOTES
HPI:    Patient ID: Whitney Toure is a 60year old female. Patient presents with complaints of increasing abdominal pain that is progressively getting worse over the last 2 months.   Patient initially only noted the symptoms after completing juhi Musculoskeletal: Negative for back pain and neck pain. Neurological: Negative for dizziness, light-headedness and headaches. Psychiatric/Behavioral: Negative for agitation.               Current Outpatient Medications:  traMADol HCl 50 MG Oral Tab [facility] distress. Neck: Neck supple. No JVD present. Cardiovascular: Normal rate, regular rhythm and normal heart sounds. Pulmonary/Chest: Effort normal and breath sounds normal. No respiratory distress. She has no wheezes. She has no rales.    Abdominal: Sof glycohemoglobin which will be drawn today. Patient has not been checking home Accu-Cheks and I urged her to start doing so. Patient will continue with dialysis for now 3 times weekly. Multiple myeloma is currently in remission.   Patient will continu

## 2019-07-05 ENCOUNTER — TELEPHONE (OUTPATIENT)
Dept: INTERNAL MEDICINE CLINIC | Facility: CLINIC | Age: 64
End: 2019-07-05

## 2019-07-05 RX ORDER — NYSTATIN 100000 U/G
1 CREAM TOPICAL 2 TIMES DAILY
Qty: 30 G | Refills: 0 | Status: ON HOLD | OUTPATIENT
Start: 2019-07-05 | End: 2020-12-09

## 2019-07-18 ENCOUNTER — TELEPHONE (OUTPATIENT)
Dept: INTERNAL MEDICINE CLINIC | Facility: CLINIC | Age: 64
End: 2019-07-18

## 2019-07-18 NOTE — TELEPHONE ENCOUNTER
Completed and signed \"Persons with Disabilities Certification for Parking Placard/License Plates\" form from Dr. Cesar Friend mailed to [place] [facility] [place]. Copy of form sent to scanning.

## 2019-08-27 ENCOUNTER — TELEPHONE (OUTPATIENT)
Dept: INTERNAL MEDICINE CLINIC | Facility: CLINIC | Age: 64
End: 2019-08-27

## 2019-08-28 ENCOUNTER — TELEPHONE (OUTPATIENT)
Dept: INTERNAL MEDICINE CLINIC | Facility: CLINIC | Age: 64
End: 2019-08-28

## 2019-08-28 RX ORDER — TRAMADOL HYDROCHLORIDE 50 MG/1
TABLET ORAL
Qty: 60 TABLET | Refills: 0 | Status: SHIPPED
Start: 2019-08-28 | End: 2019-10-09

## 2019-08-28 NOTE — TELEPHONE ENCOUNTER
Patient called about her Insulin refill from today which was denied by his old office. Patient needs this refilled (Brenna Leyva)    Script says 24 units, but patient now uses 27 units.         Any questions, call patient at:  [phone]

## 2019-08-29 NOTE — TELEPHONE ENCOUNTER
Per 6/2019 result note: You currently are taking insulin glargine 21 units daily. We should increase this up to 23 units and keep increasing this by 2 units every 4 days until your a.m. Accu-Cheks are consistently between 80 and 120.    Send me your Accu-C

## 2019-08-30 NOTE — TELEPHONE ENCOUNTER
Spoke to patient who reports she is now taking 27 units of insulin at nighttime. She reports her blood sugar was 180 this morning but she did drink some gatorade during the night.  She does not have any specific numbers to provide me but states her AM sugar

## 2019-08-30 NOTE — TELEPHONE ENCOUNTER
Okay to continue with the 27 units of long-acting insulin at nighttime. The abdominal pain occurring during dialysis may be secondary to fluid shifts occurring during dialysis.   Patient needs to discuss this more with nephrologist--should make appointment

## 2019-08-30 NOTE — TELEPHONE ENCOUNTER
Relayed MD's message to patient---verbalized understanding  Refill request is for a maintenance medication and has met the criteria specified in the Ambulatory Medication Refill Standing Order for eligibility, visits, laboratory, alerts and was sent to the

## 2019-09-04 ENCOUNTER — OFFICE VISIT (OUTPATIENT)
Dept: INTERNAL MEDICINE CLINIC | Facility: CLINIC | Age: 64
End: 2019-09-04
Payer: COMMERCIAL

## 2019-09-04 VITALS
HEIGHT: 67 IN | OXYGEN SATURATION: 97 % | SYSTOLIC BLOOD PRESSURE: 128 MMHG | TEMPERATURE: 98 F | BODY MASS INDEX: 45.99 KG/M2 | WEIGHT: 293 LBS | DIASTOLIC BLOOD PRESSURE: 80 MMHG | HEART RATE: 76 BPM

## 2019-09-04 DIAGNOSIS — R10.9 ABDOMINAL PAIN, UNSPECIFIED ABDOMINAL LOCATION: Primary | ICD-10-CM

## 2019-09-04 DIAGNOSIS — E11.9 TYPE 2 DIABETES MELLITUS WITHOUT COMPLICATION, WITH LONG-TERM CURRENT USE OF INSULIN (HCC): ICD-10-CM

## 2019-09-04 DIAGNOSIS — N08 MYELOMA CAST NEPHROPATHY (HCC): ICD-10-CM

## 2019-09-04 DIAGNOSIS — N18.6 ESRD (END STAGE RENAL DISEASE) (HCC): ICD-10-CM

## 2019-09-04 DIAGNOSIS — Z79.4 TYPE 2 DIABETES MELLITUS WITHOUT COMPLICATION, WITH LONG-TERM CURRENT USE OF INSULIN (HCC): ICD-10-CM

## 2019-09-04 DIAGNOSIS — I10 ESSENTIAL HYPERTENSION: ICD-10-CM

## 2019-09-04 DIAGNOSIS — C90.00 MYELOMA CAST NEPHROPATHY (HCC): ICD-10-CM

## 2019-09-04 DIAGNOSIS — R21 RASH: ICD-10-CM

## 2019-09-04 PROCEDURE — 99214 OFFICE O/P EST MOD 30 MIN: CPT | Performed by: INTERNAL MEDICINE

## 2019-09-04 NOTE — PROGRESS NOTES
HPI:    Patient ID: Zarina Salmeron is a 60year old female. Continues to note abd discomfort that only occurs with dialysis. .  No associated nausea/emesis. Notes constipation since starting renvela 2 wks ago--now stopped.   Has tried muscle rela 100 strip Rfl: 3   Blood Glucose Monitoring Suppl (ACCU-CHEK AFIA SMARTVIEW) W/DEVICE Does not apply Kit 1 strip by Does not apply route 2 (two) times daily.  Disp: 1 kit Rfl: 6     Allergies:  Iodine (Topical)        HIVES  Radiology Contrast *    HIVES  S talk to radiology to see if this can be set up. Patient will continue same dialysis schedule for now. Dialysis will try to prevent hypotension which may exacerbate condition. Blood pressure is currently under control.     Patient's blood sugars are t

## 2019-09-05 ENCOUNTER — PATIENT MESSAGE (OUTPATIENT)
Dept: INTERNAL MEDICINE CLINIC | Facility: CLINIC | Age: 64
End: 2019-09-05

## 2019-09-05 DIAGNOSIS — R10.9 ABDOMINAL PAIN, UNSPECIFIED ABDOMINAL LOCATION: Primary | ICD-10-CM

## 2019-09-12 RX ORDER — PREDNISONE 50 MG/1
TABLET ORAL
Qty: 3 TABLET | Refills: 0 | Status: SHIPPED | OUTPATIENT
Start: 2019-09-12 | End: 2019-10-09 | Stop reason: ALTCHOICE

## 2019-09-12 NOTE — TELEPHONE ENCOUNTER
Pt is scheduled for CT test tomorrow at 1pm  Is allergic to the dye & needs prescription for this sent to [facility] in [place]   Please call pt to advise [phone]

## 2019-09-12 NOTE — TELEPHONE ENCOUNTER
Please call Sara/out-patient scheduling at [phone]  Has questions about Prednisone pt is requesting prior to test

## 2019-09-12 NOTE — TELEPHONE ENCOUNTER
Discussed with Alexandra Harding, radiology; We will send in Prednisone order;  Alexandra Harding will let the pt know    Discussed with  in DR. SAMSON abschucky ;   Rx sent

## 2019-09-13 ENCOUNTER — HOSPITAL ENCOUNTER (OUTPATIENT)
Dept: CT IMAGING | Facility: HOSPITAL | Age: 64
Discharge: HOME | End: 2019-09-13
Attending: INTERNAL MEDICINE
Payer: COMMERCIAL

## 2019-09-13 DIAGNOSIS — R10.9 ABDOMINAL PAIN, UNSPECIFIED ABDOMINAL LOCATION: ICD-10-CM

## 2019-09-13 PROCEDURE — 74175 CTA ABDOMEN W/CONTRAST: CPT | Performed by: INTERNAL MEDICINE

## 2019-09-17 ENCOUNTER — TELEPHONE (OUTPATIENT)
Dept: INTERNAL MEDICINE CLINIC | Facility: CLINIC | Age: 64
End: 2019-09-17

## 2019-09-17 NOTE — TELEPHONE ENCOUNTER
To nursing, please tell patient the CTA of the abdomen 9/13/19 showed no narrowing of the blood vessels to the bowel (major mesenteric vessels are widely patent without significant stenosis or occlusion).   Please ask her to see Dr. Barrett Viveros on his return next

## 2019-09-17 NOTE — TELEPHONE ENCOUNTER
Pt is calling for results of CT of Abdomin & Pelvis done 9/13 at [address] [place] [address]  She would like to have the Dr on read the results, please call pt [phone]   Tasked to nursing

## 2019-09-17 NOTE — TELEPHONE ENCOUNTER
Routed to Dr. Hilton Cristina who is covering MD for Dr. Cesar Friend today. Please see message below.

## 2019-09-18 NOTE — TELEPHONE ENCOUNTER
Spoke to pt and advised in MD message below; patient verbalized understanding    Scheduled appt with MD for 10/2/19 for f/u on CT results.

## 2019-09-26 ENCOUNTER — TELEPHONE (OUTPATIENT)
Dept: INTERNAL MEDICINE CLINIC | Facility: CLINIC | Age: 64
End: 2019-09-26

## 2019-09-26 DIAGNOSIS — R10.9 ABDOMINAL PAIN, UNSPECIFIED ABDOMINAL LOCATION: ICD-10-CM

## 2019-09-26 DIAGNOSIS — N28.89 NODULE OF KIDNEY: Primary | ICD-10-CM

## 2019-09-26 NOTE — TELEPHONE ENCOUNTER
Pt is reporting \"pain during dialysis is terrible when treatment is finishing and they are unhooking me. .. If I sneeze or cough its horrible. .. the nursing staff is just as confused as me\".   She has two more treatments: Sat & Tues and it is to the point

## 2019-09-26 NOTE — TELEPHONE ENCOUNTER
Pt is scheduled for an appt next Wed, 10/2/19  Pt requesting a GI recommendation from Dr Chanda Romero. Pt having stomach problems. Previous physician no longer in practice as GI pt was told.   Tasked to nursing

## 2019-09-27 ENCOUNTER — TELEPHONE (OUTPATIENT)
Dept: GASTROENTEROLOGY | Facility: CLINIC | Age: 64
End: 2019-09-27

## 2019-09-27 NOTE — TELEPHONE ENCOUNTER
I spoke with patient and explained CT abdomen findings. Please call to see if patient can get in sooner with gastroenterologist or nurse practitioner at gastroenterologist practice--it does not have to be Dr. Chuyita Baldwin. --Hopefully within the next week or 2.

## 2019-09-27 NOTE — TELEPHONE ENCOUNTER
Pt. Is calling Dr. Jesica Phillips doesn't have an opening until Jan ph.  # [phone]   Routed to clinical

## 2019-09-27 NOTE — TELEPHONE ENCOUNTER
Dr Anthony Rai,    Based on pt's CT scan Dr Jacob Callejas would like this pt seen sooner than first available. Dr Jacob Callejas would like pt seen by first available provider. New to GI    Is it appropriate for Glendy to see this pt?

## 2019-09-27 NOTE — TELEPHONE ENCOUNTER
Marilyn/Dr. Richard's office requesting appt sooner thank first available.  Please call [phone]

## 2019-09-30 NOTE — TELEPHONE ENCOUNTER
Future Appointments   Date Time Provider Nessa Huyen   10/2/2019 11:40 AM MD ROMERO Perez   10/2/2019  5:45 PM [facility] RM1 (1.5T WIDE) [facility] EM [facility] [place]   10/16/2019 11:30 AM THA Collier Do [facility]

## 2019-10-02 ENCOUNTER — HOSPITAL ENCOUNTER (OUTPATIENT)
Dept: MRI IMAGING | Facility: HOSPITAL | Age: 64
Discharge: HOME | End: 2019-10-02
Attending: INTERNAL MEDICINE
Payer: COMMERCIAL

## 2019-10-02 DIAGNOSIS — N28.89 NODULE OF KIDNEY: ICD-10-CM

## 2019-10-02 PROCEDURE — 74181 MRI ABDOMEN W/O CONTRAST: CPT | Performed by: INTERNAL MEDICINE

## 2019-10-09 ENCOUNTER — OFFICE VISIT (OUTPATIENT)
Dept: INTERNAL MEDICINE CLINIC | Facility: CLINIC | Age: 64
End: 2019-10-09
Payer: COMMERCIAL

## 2019-10-09 VITALS
DIASTOLIC BLOOD PRESSURE: 68 MMHG | BODY MASS INDEX: 45.99 KG/M2 | TEMPERATURE: 98 F | OXYGEN SATURATION: 94 % | WEIGHT: 293 LBS | HEIGHT: 67 IN | HEART RATE: 81 BPM | SYSTOLIC BLOOD PRESSURE: 130 MMHG

## 2019-10-09 DIAGNOSIS — C90.01 MULTIPLE MYELOMA IN REMISSION (HCC): ICD-10-CM

## 2019-10-09 DIAGNOSIS — N18.5 CHRONIC RENAL FAILURE, STAGE 5 (HCC): ICD-10-CM

## 2019-10-09 DIAGNOSIS — I10 ESSENTIAL HYPERTENSION: ICD-10-CM

## 2019-10-09 DIAGNOSIS — R10.9 ABDOMINAL PAIN, UNSPECIFIED ABDOMINAL LOCATION: Primary | ICD-10-CM

## 2019-10-09 DIAGNOSIS — Z79.4 TYPE 2 DIABETES MELLITUS WITHOUT COMPLICATION, WITH LONG-TERM CURRENT USE OF INSULIN (HCC): ICD-10-CM

## 2019-10-09 DIAGNOSIS — N28.89 RIGHT KIDNEY MASS: ICD-10-CM

## 2019-10-09 DIAGNOSIS — E11.9 TYPE 2 DIABETES MELLITUS WITHOUT COMPLICATION, WITH LONG-TERM CURRENT USE OF INSULIN (HCC): ICD-10-CM

## 2019-10-09 PROCEDURE — 99214 OFFICE O/P EST MOD 30 MIN: CPT | Performed by: INTERNAL MEDICINE

## 2019-10-09 RX ORDER — TRAMADOL HYDROCHLORIDE 50 MG/1
TABLET ORAL
Qty: 60 TABLET | Refills: 0 | Status: SHIPPED | OUTPATIENT
Start: 2019-10-09 | End: 2019-12-06

## 2019-10-09 NOTE — H&P
[address] [facility] [address] Gastroenterology                                                                                                  Clinic History and Physical     Pa this.    Trials of cholestyramine and Xifaxan ineffective. Dairy is a known trigger. September 2019 CTA abdomen pelvis demonstrates atherosclerosis with major mesenteric vessels widely patent without evidence of hypo-perfusion or ischemic change.   + patient was on pancreatic enzymes. She did not feel that these were helpful and stopped taking them. She has been instructed to follow-up with her oncologist due to her history of multiple myeloma to rule out any recurrence.     Undergoes dialysis 3 susi MAIN OR   •      • COLONOSCOPY N/A 2018    Performed by Daphnie Leigh MD at [facility] ENDOSCOPY   • ESOPHAGOGASTRODUODENOSCOPY (EGD) N/A 2018    Performed by Daphnie Leigh MD at [facility] ENDOSCOPY   •      • OTHER      T and A AFIA SMARTVIEW) W/DEVICE Does not apply Kit, 1 strip by Does not apply route 2 (two) times daily. , Disp: 1 kit, Rfl: 6        Allergies:    Iodine (Topical)        HIVES  Radiology Contrast *    HIVES  Sulfa Antibiotics       RASH  Tetanus Toxoid Annabelle Jensen   ASSESSMENT/PLAN:   Cory Jose is a 60year old year-old female pt of Dr. Savage Calvert with history of H. pylori, obesity, end-stage renal disease on dialysis, anemia of chronic disease, diabetes type 2, hypertension, depression, anxiety, OA, wh recommended. The patient has had multiple abdominal imaging studies since that time. The most recent MRI did not demonstrate any pancreatic findings. Possible consideration for repeat MRCP to follow-up on the pancreatic finding.         AddendumRuthie Fat

## 2019-10-09 NOTE — PROGRESS NOTES
HPI:    Patient ID: Shawanda Roland is a 60year old female. Patient presents for follow-up. Patient continues to note abdominal pain associated with dialysis.   Apparently the rate of dialysis was slowed and patient noted improvement in symptoms g Rfl: 0   Dicyclomine HCl 20 MG Oral Tab Take 1 tablet (20 mg total) by mouth 2 (two) times daily. Disp: 180 tablet Rfl: 3   Insulin Pen Needle (BD PEN NEEDLE AFIA U/F) 32G X 4 MM Does not apply Misc Use with Basaglar at directed.  Disp: 100 each Rfl: 3 (hcc)  Type 2 diabetes mellitus without complication, with long-term current use of insulin (hcc)    Patient continues to have abdominal pain associated with dialysis. MRI did not show any stenotic lesions mesenteric arteries. .  Symptoms seem to be relate

## 2019-10-16 ENCOUNTER — OFFICE VISIT (OUTPATIENT)
Dept: GASTROENTEROLOGY | Facility: CLINIC | Age: 64
End: 2019-10-16
Payer: COMMERCIAL

## 2019-10-16 VITALS — HEART RATE: 80 BPM | SYSTOLIC BLOOD PRESSURE: 136 MMHG | DIASTOLIC BLOOD PRESSURE: 68 MMHG

## 2019-10-16 DIAGNOSIS — K86.2 PANCREATIC CYST: ICD-10-CM

## 2019-10-16 DIAGNOSIS — R10.84 GENERALIZED ABDOMINAL PAIN: Primary | ICD-10-CM

## 2019-10-16 DIAGNOSIS — R19.8 IRREGULAR BOWEL HABITS: ICD-10-CM

## 2019-10-16 PROCEDURE — 99244 OFF/OP CNSLTJ NEW/EST MOD 40: CPT | Performed by: NURSE PRACTITIONER

## 2019-11-25 ENCOUNTER — TELEPHONE (OUTPATIENT)
Dept: INTERNAL MEDICINE CLINIC | Facility: CLINIC | Age: 64
End: 2019-11-25

## 2019-11-25 DIAGNOSIS — R10.9 ABDOMINAL PAIN, UNSPECIFIED ABDOMINAL LOCATION: Primary | ICD-10-CM

## 2019-11-25 NOTE — TELEPHONE ENCOUNTER
Patient calling. Saw gastroenterologist.  Has been suggested to see specialist at [facility]  Asking Dr. Chanda Romero for name of gastroenterologist at [facility].     Best number to contact patient at  [phone]

## 2019-11-25 NOTE — TELEPHONE ENCOUNTER
Spoke to patient regarding previous phone call. Pt saw Jada Harding NP for GI (refer to OV note). Pt presented with symptoms of abdominal pain 10/10 pre/post hemodialysis on T/TH/SAT.  States she tolerates 2 hours of HD well, but when dialysis reaches into the 3r

## 2019-11-30 NOTE — TELEPHONE ENCOUNTER
I do not know of anyone at [facility]. But if we need tertiary GI opinion, would recommend Dr. May Reese at [address] [place] [address].

## 2019-12-02 NOTE — TELEPHONE ENCOUNTER
Spoke to pt and advised on MD message below; pt verbalized understanding.     Referral placed per MD written order

## 2019-12-04 ENCOUNTER — TELEPHONE (OUTPATIENT)
Dept: INTERNAL MEDICINE CLINIC | Facility: CLINIC | Age: 64
End: 2019-12-04

## 2019-12-04 NOTE — TELEPHONE ENCOUNTER
To MD:  The above refill request is for a controlled substance. Please review pended medication order. Print and sign for staff to fax to pharmacy or prescribe electronically. Last written 10/9/19 #60 with 0 refills.

## 2019-12-05 NOTE — TELEPHONE ENCOUNTER
Pt is calling she called to schedule her ron with Dr Nancy Mathews, she can't get in until 2/3  Is there anything Dr Sharon Koroma can do to get her in sooner   Tasked to nursing  She is aware Dr Sharon Koroma is out of office

## 2019-12-06 RX ORDER — TRAMADOL HYDROCHLORIDE 50 MG/1
TABLET ORAL
Qty: 60 TABLET | Refills: 0 | Status: SHIPPED | OUTPATIENT
Start: 2019-12-06 | End: 2020-02-10

## 2019-12-09 NOTE — TELEPHONE ENCOUNTER
I believe Dr. Delia Maradiaga sees patient both at [facility] [place] at [facility]--perhaps there may be more availability at one institution as opposed to the other.   Patient could also see an associate of Dr. Delia Maradiaga in the same practice if there is better availabili

## 2019-12-09 NOTE — TELEPHONE ENCOUNTER
Pt verbalized understanding; pt wants to know what she can do for her pain until her appt on 2/3    Dr. Kim Marshall please advise

## 2019-12-10 RX ORDER — GABAPENTIN 100 MG/1
100 CAPSULE ORAL 3 TIMES DAILY
Qty: 60 CAPSULE | Refills: 3 | Status: SHIPPED | OUTPATIENT
Start: 2019-12-10 | End: 2019-12-30

## 2019-12-10 NOTE — TELEPHONE ENCOUNTER
I spoke with patient. We will keep current appointment with gastroenterologist.  We will also start gabapentin 100 mg p.o. twice daily to hopefully prevent recurrent symptoms. Patient is willing to give this a try.   She will notify me if no significant i

## 2019-12-30 ENCOUNTER — OFFICE VISIT (OUTPATIENT)
Dept: INTERNAL MEDICINE CLINIC | Facility: CLINIC | Age: 64
End: 2019-12-30
Payer: COMMERCIAL

## 2019-12-30 VITALS
SYSTOLIC BLOOD PRESSURE: 134 MMHG | OXYGEN SATURATION: 95 % | DIASTOLIC BLOOD PRESSURE: 72 MMHG | HEIGHT: 67 IN | TEMPERATURE: 99 F | HEART RATE: 91 BPM | BODY MASS INDEX: 45.99 KG/M2 | WEIGHT: 293 LBS

## 2019-12-30 DIAGNOSIS — E11.9 TYPE 2 DIABETES MELLITUS WITHOUT COMPLICATION, WITH LONG-TERM CURRENT USE OF INSULIN (HCC): ICD-10-CM

## 2019-12-30 DIAGNOSIS — N18.6 ESRD (END STAGE RENAL DISEASE) (HCC): ICD-10-CM

## 2019-12-30 DIAGNOSIS — R10.9 ABDOMINAL PAIN, UNSPECIFIED ABDOMINAL LOCATION: ICD-10-CM

## 2019-12-30 DIAGNOSIS — Z79.4 TYPE 2 DIABETES MELLITUS WITHOUT COMPLICATION, WITH LONG-TERM CURRENT USE OF INSULIN (HCC): ICD-10-CM

## 2019-12-30 DIAGNOSIS — I10 ESSENTIAL HYPERTENSION: ICD-10-CM

## 2019-12-30 DIAGNOSIS — R05.9 COUGH: Primary | ICD-10-CM

## 2019-12-30 DIAGNOSIS — C90.01 MULTIPLE MYELOMA IN REMISSION (HCC): ICD-10-CM

## 2019-12-30 PROCEDURE — 99213 OFFICE O/P EST LOW 20 MIN: CPT | Performed by: INTERNAL MEDICINE

## 2019-12-30 RX ORDER — AZITHROMYCIN 250 MG/1
TABLET, FILM COATED ORAL
Qty: 6 TABLET | Refills: 0 | Status: SHIPPED | OUTPATIENT
Start: 2019-12-30 | End: 2020-06-17 | Stop reason: ALTCHOICE

## 2019-12-30 NOTE — PROGRESS NOTES
HPI:    Patient ID: Kristi Colon is a 60year old female. Patient presents with complaints of cough productive of thick white sputum that started approximately 5 days ago.   She feels that her symptoms are getting worse  The cough is making it apply Misc Use with Basaglar at directed. 100 each 3   • lidocaine 5 % External Patch Place 1 patch onto the skin daily.  (Patient taking differently: Place 1 patch onto the skin as needed.  ) 30 patch 0   • Glucose Blood (BLOOD GLUCOSE TEST) In Vitro Strip (hcc)  Multiple myeloma in remission (hcc)  Abdominal pain, unspecified abdominal location    Patient has cough consistent with possible bronchitis  Empirically treat with Z-Kranthi x1  Patient may also use Robitussin-DM--sugar-free as needed  She will call if

## 2020-01-16 ENCOUNTER — TELEPHONE (OUTPATIENT)
Dept: INTERNAL MEDICINE CLINIC | Facility: CLINIC | Age: 65
End: 2020-01-16

## 2020-01-16 RX ORDER — DULOXETIN HYDROCHLORIDE 30 MG/1
30 CAPSULE, DELAYED RELEASE ORAL DAILY
Qty: 30 CAPSULE | Refills: 3 | Status: SHIPPED | OUTPATIENT
Start: 2020-01-16 | End: 2021-08-23

## 2020-01-16 NOTE — TELEPHONE ENCOUNTER
Pt asked that Dr Sanchez Reason please call her  Pt really struggling with dialysis  Pt is at dialysis right now, having a lot of pain during dialysis  Feels like she is a wits end, can Dr Sanchez Reason offer any help?   Pt was very emotional  Tasked to nursing

## 2020-01-16 NOTE — TELEPHONE ENCOUNTER
I spoke with pt. She continue to have pain with dialysis. She has seen GI specialist at [facility] who feels that symptons may be muculoskeltal related and has ordered CT scan. Will try to add small dose cymbalta to help with chronic pain  Await CT results.   Pt understands and agrees

## 2020-01-16 NOTE — TELEPHONE ENCOUNTER
To Dr. Mariella Mckeon - see below - pain states pain level is 10/10 - ER advised. Pt states pain has lasted for over 1 year. Pt would like to go to ER but \"by the time they get to me in the ER, the pain will be gone\". Does not want to waste her time/energy to go to ER. Pt states she cannot keep this up - \"someone has to help me - I am very frustrated with everything\". Pt denies thoughts of hurting herself or others. Pt wants to speak to you.

## 2020-02-07 NOTE — TELEPHONE ENCOUNTER
To Dr. Neha Mao: The above refill request is for a controlled substance. Please review pended medication order. Print and sign for staff to fax to pharmacy or prescribe electronically.

## 2020-02-10 RX ORDER — TRAMADOL HYDROCHLORIDE 50 MG/1
TABLET ORAL
Qty: 60 TABLET | Refills: 0 | Status: SHIPPED | OUTPATIENT
Start: 2020-02-10 | End: 2020-04-14

## 2020-02-26 ENCOUNTER — MED REC SCAN ONLY (OUTPATIENT)
Dept: INTERNAL MEDICINE CLINIC | Facility: CLINIC | Age: 65
End: 2020-02-26

## 2020-03-26 ENCOUNTER — TELEPHONE (OUTPATIENT)
Dept: INTERNAL MEDICINE CLINIC | Facility: CLINIC | Age: 65
End: 2020-03-26

## 2020-03-26 NOTE — TELEPHONE ENCOUNTER
Patient left a message with the service    She has had a cold for a week,  For the last 3 days she has had diarrhea     She is concerned she has the virus, please call pt [phone]

## 2020-03-26 NOTE — TELEPHONE ENCOUNTER
To Dr. Neha Mao - see pt message below. Pt  listening to the news that diarrhea is one of the new sx of virus. Pt states she does have some underlying IBS issues but pt does not feel related to IBS. Sx onset: 3/18/20.     Respiratory infection triage:    Fe

## 2020-03-26 NOTE — TELEPHONE ENCOUNTER
Probable viral gastroenteritis. Very unlikely to be related to COVID 19  Remember to push fluids, stick with bland diet including toast, banana, rice. Usually episodes of gastroenteritis are self-limiting and resolve anywhere between 3 to 7 days.   If dev

## 2020-04-02 ENCOUNTER — TELEPHONE (OUTPATIENT)
Dept: INTERNAL MEDICINE CLINIC | Facility: CLINIC | Age: 65
End: 2020-04-02

## 2020-04-13 ENCOUNTER — TELEPHONE (OUTPATIENT)
Dept: INTERNAL MEDICINE CLINIC | Facility: CLINIC | Age: 65
End: 2020-04-13

## 2020-04-14 ENCOUNTER — TELEPHONE (OUTPATIENT)
Dept: INTERNAL MEDICINE CLINIC | Facility: CLINIC | Age: 65
End: 2020-04-14

## 2020-04-14 RX ORDER — TRAMADOL HYDROCHLORIDE 50 MG/1
TABLET ORAL
Qty: 60 TABLET | Refills: 0 | Status: SHIPPED | OUTPATIENT
Start: 2020-04-14 | End: 2020-06-16

## 2020-04-14 RX ORDER — DICYCLOMINE HCL 20 MG
TABLET ORAL
Qty: 180 TABLET | Refills: 3 | Status: ON HOLD | OUTPATIENT
Start: 2020-04-14 | End: 2020-12-09

## 2020-04-14 NOTE — TELEPHONE ENCOUNTER
To MD:  The above refill request is for a controlled substance. Please review pended medication order. Print and sign for staff to fax to pharmacy or prescribe electronically. To DR. SAMSON

## 2020-05-12 ENCOUNTER — TELEPHONE (OUTPATIENT)
Dept: INTERNAL MEDICINE CLINIC | Facility: CLINIC | Age: 65
End: 2020-05-12

## 2020-05-12 DIAGNOSIS — R06.02 SHORTNESS OF BREATH: Primary | ICD-10-CM

## 2020-05-21 ENCOUNTER — TELEPHONE (OUTPATIENT)
Dept: INTERNAL MEDICINE CLINIC | Facility: CLINIC | Age: 65
End: 2020-05-21

## 2020-05-21 RX ORDER — DEXLANSOPRAZOLE 60 MG/1
60 CAPSULE, DELAYED RELEASE ORAL DAILY
Qty: 30 CAPSULE | Refills: 3 | Status: SHIPPED | OUTPATIENT
Start: 2020-05-21 | End: 2020-08-17

## 2020-05-21 NOTE — TELEPHONE ENCOUNTER
Called and Relayed MD's message to patient---verbalized understanding  She will call in 2 weeks if no improvement

## 2020-05-21 NOTE — TELEPHONE ENCOUNTER
Please call pt, experiencing terrible indigestion  Omeprazole does not work for pt  Pt was wondering if something could be prescribed?   [facility] uses [facility]  Tasked to nursing

## 2020-05-21 NOTE — TELEPHONE ENCOUNTER
We can try a stronger proton pump inhibitor.   Since omeprazole does not work, will try Dexilant 60 mg daily  Prescription sent to pharmacy  Call if no improvement over the next 2 weeks

## 2020-05-21 NOTE — TELEPHONE ENCOUNTER
To Dr. Zohreh Henriquez - see pt message below. Pt states she constantly has a problem with epigastric discomfort. Stopped omeprazole due to no improvement. OTC meds have not helped.   \"The last month I feel like I have fire in my throat and I have indigestion jama

## 2020-06-15 ENCOUNTER — TELEPHONE (OUTPATIENT)
Dept: INTERNAL MEDICINE CLINIC | Facility: CLINIC | Age: 65
End: 2020-06-15

## 2020-06-15 NOTE — TELEPHONE ENCOUNTER
To MD:  The above refill request is for a controlled substance. Please review pended medication order. Print and sign for staff to fax to pharmacy or prescribe electronically.   Last RX 4/14/20 #60

## 2020-06-15 NOTE — TELEPHONE ENCOUNTER
Pt requesting refill for:  Tramadol  Pt uses [facility] as pharmacy  Pt is low on medication  Tasked to [employer]

## 2020-06-16 RX ORDER — TRAMADOL HYDROCHLORIDE 50 MG/1
TABLET ORAL
Qty: 60 TABLET | Refills: 0 | Status: SHIPPED | OUTPATIENT
Start: 2020-06-16 | End: 2020-08-10

## 2020-06-17 ENCOUNTER — LAB ENCOUNTER (OUTPATIENT)
Dept: LAB | Age: 65
End: 2020-06-17
Attending: INTERNAL MEDICINE
Payer: MEDICARE

## 2020-06-17 ENCOUNTER — OFFICE VISIT (OUTPATIENT)
Dept: INTERNAL MEDICINE CLINIC | Facility: CLINIC | Age: 65
End: 2020-06-17
Payer: MEDICARE

## 2020-06-17 VITALS
WEIGHT: 293 LBS | SYSTOLIC BLOOD PRESSURE: 150 MMHG | OXYGEN SATURATION: 98 % | HEART RATE: 89 BPM | BODY MASS INDEX: 45.99 KG/M2 | HEIGHT: 67 IN | DIASTOLIC BLOOD PRESSURE: 80 MMHG | TEMPERATURE: 98 F

## 2020-06-17 DIAGNOSIS — I89.0 LYMPHEDEMA: ICD-10-CM

## 2020-06-17 DIAGNOSIS — Z79.4 TYPE 2 DIABETES MELLITUS WITHOUT COMPLICATION, WITH LONG-TERM CURRENT USE OF INSULIN (HCC): Primary | ICD-10-CM

## 2020-06-17 DIAGNOSIS — C90.01 MULTIPLE MYELOMA IN REMISSION (HCC): ICD-10-CM

## 2020-06-17 DIAGNOSIS — F33.9 EPISODE OF RECURRENT MAJOR DEPRESSIVE DISORDER, UNSPECIFIED DEPRESSION EPISODE SEVERITY (HCC): ICD-10-CM

## 2020-06-17 DIAGNOSIS — E11.9 TYPE 2 DIABETES MELLITUS WITHOUT COMPLICATION, WITH LONG-TERM CURRENT USE OF INSULIN (HCC): ICD-10-CM

## 2020-06-17 DIAGNOSIS — N18.6 ESRD (END STAGE RENAL DISEASE) (HCC): ICD-10-CM

## 2020-06-17 DIAGNOSIS — E11.9 TYPE 2 DIABETES MELLITUS WITHOUT COMPLICATION, WITH LONG-TERM CURRENT USE OF INSULIN (HCC): Primary | ICD-10-CM

## 2020-06-17 DIAGNOSIS — Z79.4 TYPE 2 DIABETES MELLITUS WITHOUT COMPLICATION, WITH LONG-TERM CURRENT USE OF INSULIN (HCC): ICD-10-CM

## 2020-06-17 DIAGNOSIS — E66.8 EXTREME OBESITY: ICD-10-CM

## 2020-06-17 PROCEDURE — 80053 COMPREHEN METABOLIC PANEL: CPT

## 2020-06-17 PROCEDURE — 84165 PROTEIN E-PHORESIS SERUM: CPT

## 2020-06-17 PROCEDURE — 36415 COLL VENOUS BLD VENIPUNCTURE: CPT

## 2020-06-17 PROCEDURE — 86334 IMMUNOFIX E-PHORESIS SERUM: CPT

## 2020-06-17 PROCEDURE — 83036 HEMOGLOBIN GLYCOSYLATED A1C: CPT

## 2020-06-17 PROCEDURE — 84443 ASSAY THYROID STIM HORMONE: CPT

## 2020-06-17 PROCEDURE — 83883 ASSAY NEPHELOMETRY NOT SPEC: CPT

## 2020-06-17 PROCEDURE — 85025 COMPLETE CBC W/AUTO DIFF WBC: CPT

## 2020-06-17 PROCEDURE — 82607 VITAMIN B-12: CPT

## 2020-06-17 PROCEDURE — 80061 LIPID PANEL: CPT

## 2020-06-17 PROCEDURE — 99214 OFFICE O/P EST MOD 30 MIN: CPT | Performed by: INTERNAL MEDICINE

## 2020-06-17 RX ORDER — FUROSEMIDE 80 MG
80 TABLET ORAL DAILY
Status: ON HOLD | COMMUNITY
Start: 2020-04-21 | End: 2020-12-09

## 2020-06-17 NOTE — PROGRESS NOTES
HPI:    Patient ID: Shan Huff is a 63year old female. Presents for f/u    Back pain better with lidoderm patch    Abdominal pain with dialysis much better since starting Dexilant. Patient has been very compliant with dialysis.     She ha SMARTVIEW) W/DEVICE Does not apply Kit 1 strip by Does not apply route 2 (two) times daily. 1 kit 6   • furosemide 80 MG Oral Tab Take 80 mg by mouth 2 (two) times daily.      • Dexlansoprazole (DEXILANT) 60 MG Oral Capsule Delayed Release Take 60 mg by kristine not checking Accu-Cheks at home and I urged her to start taking this so that we can better assess current disease. Patient continues with regular dialysis 3 times weekly. She is tolerating this well.     Abdominal pain is much better since starting Dexi

## 2020-06-19 DIAGNOSIS — E78.5 HYPERLIPIDEMIA, UNSPECIFIED HYPERLIPIDEMIA TYPE: Primary | ICD-10-CM

## 2020-06-19 RX ORDER — ROSUVASTATIN CALCIUM 5 MG/1
5 TABLET, COATED ORAL NIGHTLY
Qty: 90 TABLET | Refills: 3 | Status: SHIPPED | OUTPATIENT
Start: 2020-06-19 | End: 2021-08-23

## 2020-06-29 ENCOUNTER — TELEPHONE (OUTPATIENT)
Dept: INTERNAL MEDICINE CLINIC | Facility: CLINIC | Age: 65
End: 2020-06-29

## 2020-06-29 RX ORDER — LIDOCAINE 50 MG/G
1 PATCH TOPICAL
Qty: 30 PATCH | Refills: 5 | Status: ON HOLD | OUTPATIENT
Start: 2020-06-29 | End: 2020-12-09

## 2020-06-29 NOTE — TELEPHONE ENCOUNTER
Per OV 6/17/20  Her back pain is also better with Lidoderm patch which she will continue on an as needed basis.

## 2020-06-29 NOTE — TELEPHONE ENCOUNTER
Pt requesting refill for:  Lidocaine Patch   [facility] as pharmacy  Tasked to [employer]

## 2020-06-30 ENCOUNTER — TELEPHONE (OUTPATIENT)
Dept: INTERNAL MEDICINE CLINIC | Facility: CLINIC | Age: 65
End: 2020-06-30

## 2020-06-30 NOTE — TELEPHONE ENCOUNTER
Patient saw the NP at her Oncologist's office she recommends patient has a Bone Marrow test  [place] is the closest location they gave her does Dr Vargas Irizarry know of a closer location    Please call patient [phone]

## 2020-06-30 NOTE — TELEPHONE ENCOUNTER
The bone marrow would need to be done where the oncologist is approved to have it done--therefore patient should check with Dr. Cash Boards office to see if there is any different location they can do it at

## 2020-07-06 RX ORDER — NALOXONE HYDROCHLORIDE 0.4 MG/ML
80 INJECTION, SOLUTION INTRAMUSCULAR; INTRAVENOUS; SUBCUTANEOUS AS NEEDED
Status: DISCONTINUED | OUTPATIENT
Start: 2020-07-10 | End: 2020-07-12

## 2020-07-06 RX ORDER — MIDAZOLAM HYDROCHLORIDE 1 MG/ML
1 INJECTION INTRAMUSCULAR; INTRAVENOUS EVERY 5 MIN PRN
Status: DISCONTINUED | OUTPATIENT
Start: 2020-07-10 | End: 2020-07-12

## 2020-07-06 RX ORDER — FLUMAZENIL 0.1 MG/ML
0.2 INJECTION, SOLUTION INTRAVENOUS AS NEEDED
Status: DISCONTINUED | OUTPATIENT
Start: 2020-07-10 | End: 2020-07-12

## 2020-07-06 RX ORDER — SODIUM CHLORIDE 9 MG/ML
INJECTION, SOLUTION INTRAVENOUS CONTINUOUS
Status: DISCONTINUED | OUTPATIENT
Start: 2020-07-10 | End: 2020-07-12

## 2020-07-06 NOTE — IMAGING NOTE
PT CALLED, INSTRUCTIONS GIVEN. PT ON HEPARIN FOR DIALYSIS, PT HAS DIALYSIS 1 DAY PRIOR. PINK SHEET SIGN OFF MD AWARE.   ORDER PLACED FOR CBC, PT & INR.

## 2020-07-08 ENCOUNTER — LAB ENCOUNTER (OUTPATIENT)
Dept: LAB | Facility: HOSPITAL | Age: 65
End: 2020-07-08
Attending: NURSE PRACTITIONER
Payer: MEDICARE

## 2020-07-08 DIAGNOSIS — Z11.59 ENCOUNTER FOR SCREENING FOR OTHER VIRAL DISEASES: ICD-10-CM

## 2020-07-09 LAB — SARS-COV-2 RNA RESP QL NAA+PROBE: NOT DETECTED

## 2020-07-10 ENCOUNTER — HOSPITAL ENCOUNTER (OUTPATIENT)
Dept: CT IMAGING | Facility: HOSPITAL | Age: 65
Discharge: HOME | End: 2020-07-10
Attending: NURSE PRACTITIONER
Payer: MEDICARE

## 2020-07-10 VITALS
DIASTOLIC BLOOD PRESSURE: 64 MMHG | RESPIRATION RATE: 16 BRPM | BODY MASS INDEX: 45.99 KG/M2 | HEART RATE: 76 BPM | SYSTOLIC BLOOD PRESSURE: 160 MMHG | HEIGHT: 67 IN | OXYGEN SATURATION: 96 % | WEIGHT: 293 LBS

## 2020-07-10 DIAGNOSIS — C90.01 MULTIPLE MYELOMA IN REMISSION (HCC): ICD-10-CM

## 2020-07-10 LAB
DEPRECATED RDW RBC AUTO: 45.2 FL (ref 35.1–46.3)
ERYTHROCYTE [DISTWIDTH] IN BLOOD BY AUTOMATED COUNT: 12.5 % (ref 11–15)
HCT VFR BLD AUTO: 33 % (ref 35–48)
HGB BLD-MCNC: 10.6 G/DL (ref 12–16)
INR BLD: 1.13 (ref 0.9–1.2)
MCH RBC QN AUTO: 31.9 PG (ref 26–34)
MCHC RBC AUTO-ENTMCNC: 32.1 G/DL (ref 31–37)
MCV RBC AUTO: 99.4 FL (ref 80–100)
PLATELET # BLD AUTO: 198 10(3)UL (ref 150–450)
PROTHROMBIN TIME: 14.3 SECONDS (ref 11.8–14.5)
RBC # BLD AUTO: 3.32 X10(6)UL (ref 3.8–5.3)
WBC # BLD AUTO: 12.8 X10(3) UL (ref 4–11)

## 2020-07-10 PROCEDURE — 88341 IMHCHEM/IMCYTCHM EA ADD ANTB: CPT | Performed by: NURSE PRACTITIONER

## 2020-07-10 PROCEDURE — 85610 PROTHROMBIN TIME: CPT | Performed by: RADIOLOGY

## 2020-07-10 PROCEDURE — 88275 CYTOGENETICS 100-300: CPT | Performed by: RADIOLOGY

## 2020-07-10 PROCEDURE — 38222 DX BONE MARROW BX & ASPIR: CPT | Performed by: NURSE PRACTITIONER

## 2020-07-10 PROCEDURE — 88342 IMHCHEM/IMCYTCHM 1ST ANTB: CPT | Performed by: NURSE PRACTITIONER

## 2020-07-10 PROCEDURE — 88291 CYTO/MOLECULAR REPORT: CPT | Performed by: RADIOLOGY

## 2020-07-10 PROCEDURE — 88237 TISSUE CULTURE BONE MARROW: CPT | Performed by: RADIOLOGY

## 2020-07-10 PROCEDURE — 88184 FLOWCYTOMETRY/ TC 1 MARKER: CPT | Performed by: RADIOLOGY

## 2020-07-10 PROCEDURE — 88313 SPECIAL STAINS GROUP 2: CPT | Performed by: NURSE PRACTITIONER

## 2020-07-10 PROCEDURE — 85097 BONE MARROW INTERPRETATION: CPT | Performed by: NURSE PRACTITIONER

## 2020-07-10 PROCEDURE — 88264 CHROMOSOME ANALYSIS 20-25: CPT | Performed by: RADIOLOGY

## 2020-07-10 PROCEDURE — 77012 CT SCAN FOR NEEDLE BIOPSY: CPT | Performed by: NURSE PRACTITIONER

## 2020-07-10 PROCEDURE — 96360 HYDRATION IV INFUSION INIT: CPT

## 2020-07-10 PROCEDURE — 85027 COMPLETE CBC AUTOMATED: CPT | Performed by: RADIOLOGY

## 2020-07-10 PROCEDURE — 99152 MOD SED SAME PHYS/QHP 5/>YRS: CPT | Performed by: NURSE PRACTITIONER

## 2020-07-10 PROCEDURE — 88311 DECALCIFY TISSUE: CPT | Performed by: NURSE PRACTITIONER

## 2020-07-10 PROCEDURE — 88189 FLOWCYTOMETRY/READ 16 & >: CPT | Performed by: RADIOLOGY

## 2020-07-10 PROCEDURE — 88360 TUMOR IMMUNOHISTOCHEM/MANUAL: CPT | Performed by: NURSE PRACTITIONER

## 2020-07-10 PROCEDURE — 88305 TISSUE EXAM BY PATHOLOGIST: CPT | Performed by: NURSE PRACTITIONER

## 2020-07-10 PROCEDURE — 36415 COLL VENOUS BLD VENIPUNCTURE: CPT | Performed by: RADIOLOGY

## 2020-07-10 PROCEDURE — 88185 FLOWCYTOMETRY/TC ADD-ON: CPT | Performed by: RADIOLOGY

## 2020-07-10 PROCEDURE — 88271 CYTOGENETICS DNA PROBE: CPT | Performed by: RADIOLOGY

## 2020-07-10 RX ORDER — MIDAZOLAM HYDROCHLORIDE 1 MG/ML
INJECTION INTRAMUSCULAR; INTRAVENOUS
Status: COMPLETED | OUTPATIENT
Start: 2020-07-10 | End: 2020-07-10

## 2020-07-10 RX ORDER — MIDAZOLAM HYDROCHLORIDE 1 MG/ML
INJECTION INTRAMUSCULAR; INTRAVENOUS
Status: DISPENSED
Start: 2020-07-10 | End: 2020-07-10

## 2020-07-10 RX ADMIN — MIDAZOLAM HYDROCHLORIDE 2 MG: 1 INJECTION INTRAMUSCULAR; INTRAVENOUS at 10:09:00

## 2020-07-10 RX ADMIN — SODIUM CHLORIDE 10 ML/HR: 9 INJECTION, SOLUTION INTRAVENOUS at 09:50:00

## 2020-07-10 RX ADMIN — MIDAZOLAM HYDROCHLORIDE 1 MG: 1 INJECTION INTRAMUSCULAR; INTRAVENOUS at 10:15:11

## 2020-07-10 NOTE — PROGRESS NOTES
Pt in Rad holding for post procedure care /assessment. NO PT C.O. SITE D/I.  VSS, COLOR PINK. M.A.E.

## 2020-07-10 NOTE — BRIEF PROCEDURE NOTE
[facility] HOSP - [facility]  Procedure Note    Alejandra Drilling Patient Status:  Outpatient    3/7/1955 MRN [id number]   Location [facility] Attending Melita Chacko NP   Hosp Day # 0 PCP Maricruz Walsh MD     Procedure: Percutaneous

## 2020-07-10 NOTE — H&P
History & Physical Examination    Patient Name: Cheng Lopez  MRN: [id number]  [facility]: [id number]  YOB: 1955    Diagnosis: History of multiple myeloma    Present Illness: History of multiple myeloma    (Not in a hospital admission)    M OTHER      C section x 2   • OTHER      ovarian cyst   • OTHER SURGICAL HISTORY      ? RSO for cyst   • OTHER SURGICAL HISTORY      lymphedema surgery   • TONSILLECTOMY     • TUBAL LIGATION  11/89     Family History   Problem Relation Age of Onset   • Diabe

## 2020-07-15 LAB
CD10 CELLS NFR SPEC: 3 %
CD11C CELLS NFR SPEC: 8 %
CD14 CELLS NFR SPEC: 1 %
CD19 CELLS NFR SPEC: 27 %
CD19/CD10 CELLS: 3 %
CD20 CELLS NFR SPEC: 28 %
CD22 CELLS NFR SPEC: 24 %
CD23 CELLS NFR SPEC: 18 %
CD3 CELLS NFR SPEC: 58 %
CD3+CD4+ CELLS NFR SPEC: 36 %
CD3+CD4+ CELLS/CD3+CD8+ CLL SPEC: 1.6
CD3+CD8+ CELLS NFR SPEC: 22 %
CD45 CELLS NFR SPEC: 100 %
CD45-/CD38+ KAPPA: 60 %
CD45-/CD38+ LAMBDA: 30 %
CD5 CELLS NFR SPEC: 58 %
CD5/CD19 CELLS: 1 %
CD56 CELLS NFR SPEC: 12 %
CD56 CELLS NFR SPEC: 12 %
CD7 CELLS NFR SPEC: 60 %
CELL SURF KAPPA/LAMBDA RATIO: 1.4
CELL SURF LAMBDA LIGHT CHAIN: 11 %
CELL SURFACE KAPPA LIGHT CHAIN: 15 %
FMC7 CELLS NFR SPEC: 19 %

## 2020-07-17 NOTE — PROGRESS NOTES
Bone marrow results reviewed--a minute monoclonal plasma cell population is detected. Overall no evidence of relapse at this time and continued monitoring is reasonable.  No need to start any treatment at this time

## 2020-08-10 ENCOUNTER — TELEPHONE (OUTPATIENT)
Dept: INTERNAL MEDICINE CLINIC | Facility: CLINIC | Age: 65
End: 2020-08-10

## 2020-08-10 RX ORDER — TRAMADOL HYDROCHLORIDE 50 MG/1
TABLET ORAL
Qty: 60 TABLET | Refills: 0 | Status: SHIPPED | OUTPATIENT
Start: 2020-08-10 | End: 2020-09-08

## 2020-08-10 NOTE — TELEPHONE ENCOUNTER
Pt requesting refill for:  Tramadol  [facility] [place] [facility]  Pt is low on medication  Tasked to [employer]

## 2020-08-10 NOTE — TELEPHONE ENCOUNTER
Please call pt  Experiencing pain since bone marrow biopsy two weeks ago  pain in back right below shoulder blade, across back and from shoulder down to elbow on right hand  Does pt need to be seen?   Tasked to nursing

## 2020-08-10 NOTE — TELEPHONE ENCOUNTER
Doubt that this pain would be from bone marrow aspirate. However may have resulted from positioning during bone marrow aspirate/biopsy.   Continue with heating pad and tramadol as needed  Can schedule follow-up appointment later this week if no improvement

## 2020-08-10 NOTE — TELEPHONE ENCOUNTER
To Dr. Shannon Kong---    Patient is calling to report that she has been experiencing increased pain after her bone marrow bx 2 weeks ago. States biopsy was on L hip.  Reports shortly after she started developing pain in the L shoulder that radiates down to her L elbo

## 2020-08-16 ENCOUNTER — TELEPHONE (OUTPATIENT)
Dept: INTERNAL MEDICINE CLINIC | Facility: CLINIC | Age: 65
End: 2020-08-16

## 2020-08-17 NOTE — TELEPHONE ENCOUNTER
Per OV 6/2020  Abdominal pain is much better since starting Dexilant which was changed from previous omeprazole. Previous GI evaluation has been unremarkable.   She will continue with same Dexilant    Requested Prescriptions     Pending Prescriptions Disp

## 2020-08-17 NOTE — TELEPHONE ENCOUNTER
[facility] [place] [facility] for:  Ciera Forde  \"alternative requested: insurance will only cover 90 per year.  Medication requires a prior auth to continue on it\"  Fax placed in purple folder  Tasked to [employer]

## 2020-08-19 NOTE — TELEPHONE ENCOUNTER
ambrose'l fax rec'd from [facility]  For Prior Authorization call [phone], pt NC#[id number]  Fax placed in purple folder  Tasked to [employer]

## 2020-08-20 ENCOUNTER — OFFICE VISIT (OUTPATIENT)
Dept: INTERNAL MEDICINE CLINIC | Facility: CLINIC | Age: 65
End: 2020-08-20
Payer: MEDICARE

## 2020-08-20 VITALS
WEIGHT: 293 LBS | SYSTOLIC BLOOD PRESSURE: 118 MMHG | DIASTOLIC BLOOD PRESSURE: 80 MMHG | TEMPERATURE: 97 F | HEIGHT: 67 IN | OXYGEN SATURATION: 96 % | BODY MASS INDEX: 45.99 KG/M2 | HEART RATE: 95 BPM

## 2020-08-20 DIAGNOSIS — R10.9 ABDOMINAL PAIN, UNSPECIFIED ABDOMINAL LOCATION: ICD-10-CM

## 2020-08-20 DIAGNOSIS — N18.6 ESRD (END STAGE RENAL DISEASE) (HCC): ICD-10-CM

## 2020-08-20 DIAGNOSIS — E11.9 TYPE 2 DIABETES MELLITUS WITHOUT COMPLICATION, WITH LONG-TERM CURRENT USE OF INSULIN (HCC): ICD-10-CM

## 2020-08-20 DIAGNOSIS — Z79.4 TYPE 2 DIABETES MELLITUS WITHOUT COMPLICATION, WITH LONG-TERM CURRENT USE OF INSULIN (HCC): ICD-10-CM

## 2020-08-20 DIAGNOSIS — M77.8 TENDINITIS OF RIGHT FOREARM: ICD-10-CM

## 2020-08-20 DIAGNOSIS — C90.01 MULTIPLE MYELOMA IN REMISSION (HCC): ICD-10-CM

## 2020-08-20 DIAGNOSIS — L72.0 INCLUSION CYST: Primary | ICD-10-CM

## 2020-08-20 DIAGNOSIS — I10 ESSENTIAL HYPERTENSION: ICD-10-CM

## 2020-08-20 PROCEDURE — 99214 OFFICE O/P EST MOD 30 MIN: CPT | Performed by: INTERNAL MEDICINE

## 2020-08-20 PROCEDURE — G0463 HOSPITAL OUTPT CLINIC VISIT: HCPCS | Performed by: INTERNAL MEDICINE

## 2020-08-20 NOTE — PROGRESS NOTES
HPI:    Patient ID: Cory Jose is a 63year old female. Patient presents with complaints of nodule that she notes on left thumb.     Patient also notes that her right hip and shoulder pain is better after developing some muscle strain followi 20 MG Oral Tab TAKE 1 TABLET BY MOUTH TWICE A  tablet 3   • DULoxetine HCl 30 MG Oral Cap DR Particles Take 1 capsule (30 mg total) by mouth daily.  30 capsule 3   • nystatin 388181 UNIT/GM External Cream Apply 1 Application topically 2 (two) times d complication, with long-term current use of insulin (hcc)  Esrd (end stage renal disease) (hcc)  Multiple myeloma in remission (hcc)  Abdominal pain, unspecified abdominal location  Tendinitis of right forearm    Patient has probable small inclusion cyst l

## 2020-09-08 ENCOUNTER — TELEPHONE (OUTPATIENT)
Dept: INTERNAL MEDICINE CLINIC | Facility: CLINIC | Age: 65
End: 2020-09-08

## 2020-09-08 DIAGNOSIS — L72.0 INCLUSION CYST: Primary | ICD-10-CM

## 2020-09-08 DIAGNOSIS — M79.645 THUMB PAIN, LEFT: ICD-10-CM

## 2020-09-08 RX ORDER — TRAMADOL HYDROCHLORIDE 50 MG/1
TABLET ORAL
Qty: 60 TABLET | Refills: 0 | Status: SHIPPED | OUTPATIENT
Start: 2020-09-08 | End: 2020-10-07

## 2020-09-08 NOTE — TELEPHONE ENCOUNTER
Patient calling, saw Dr. Whitney Max 2 weeks ago for  lump on thumb. Has been getting larger and painful when she bumps it.   Asking for referral to hand specialist.    Best number to contact patient at [phone]

## 2020-09-14 ENCOUNTER — TELEPHONE (OUTPATIENT)
Dept: INTERNAL MEDICINE CLINIC | Facility: CLINIC | Age: 65
End: 2020-09-14

## 2020-09-14 NOTE — TELEPHONE ENCOUNTER
Would schedule appointment for evaluation of leg issues. Would continue to use heating pad to upper extremity pain as this appeared to be a tendinitis when patient examined at last visit.   Also continue to not take blood pressure in that arm  If symptom

## 2020-09-14 NOTE — TELEPHONE ENCOUNTER
Spoke with pt. She has already addressed arm pain with Dr. Lilia Mancuso. Dialysis nurses have tried putting the BP cuff on different areas of her arm, but this does not seem to be making any difference in her elbow pain.      Leg pain on the medial side of her rig

## 2020-09-14 NOTE — TELEPHONE ENCOUNTER
Called patient and relayed DR. SAMSON message - verbalized understanding .  She will call to schedule ron for leg issue

## 2020-09-14 NOTE — TELEPHONE ENCOUNTER
Pt. Has been having right arm pain for about three weeks and right leg pain for a week please advise ph.  # [phone]  Routed to clinical

## 2020-09-16 ENCOUNTER — OFFICE VISIT (OUTPATIENT)
Dept: INTERNAL MEDICINE CLINIC | Facility: CLINIC | Age: 65
End: 2020-09-16
Payer: MEDICARE

## 2020-09-16 VITALS
SYSTOLIC BLOOD PRESSURE: 110 MMHG | OXYGEN SATURATION: 94 % | TEMPERATURE: 98 F | DIASTOLIC BLOOD PRESSURE: 80 MMHG | HEART RATE: 83 BPM | HEIGHT: 67 IN | BODY MASS INDEX: 57 KG/M2

## 2020-09-16 DIAGNOSIS — I10 ESSENTIAL HYPERTENSION: ICD-10-CM

## 2020-09-16 DIAGNOSIS — C90.01 MULTIPLE MYELOMA IN REMISSION (HCC): ICD-10-CM

## 2020-09-16 DIAGNOSIS — M79.604 ACUTE PAIN OF RIGHT LOWER EXTREMITY: ICD-10-CM

## 2020-09-16 DIAGNOSIS — Z79.4 TYPE 2 DIABETES MELLITUS WITHOUT COMPLICATION, WITH LONG-TERM CURRENT USE OF INSULIN (HCC): ICD-10-CM

## 2020-09-16 DIAGNOSIS — E11.9 TYPE 2 DIABETES MELLITUS WITHOUT COMPLICATION, WITH LONG-TERM CURRENT USE OF INSULIN (HCC): ICD-10-CM

## 2020-09-16 DIAGNOSIS — M79.601 DIFFUSE PAIN IN RIGHT UPPER EXTREMITY: Primary | ICD-10-CM

## 2020-09-16 DIAGNOSIS — I89.0 LYMPHEDEMA: ICD-10-CM

## 2020-09-16 DIAGNOSIS — N18.5 CHRONIC RENAL FAILURE, STAGE 5 (HCC): ICD-10-CM

## 2020-09-16 PROCEDURE — 99213 OFFICE O/P EST LOW 20 MIN: CPT | Performed by: INTERNAL MEDICINE

## 2020-09-16 PROCEDURE — G0463 HOSPITAL OUTPT CLINIC VISIT: HCPCS | Performed by: INTERNAL MEDICINE

## 2020-09-16 RX ORDER — MELOXICAM 15 MG/1
15 TABLET ORAL DAILY
Qty: 30 TABLET | Refills: 0 | Status: ON HOLD | OUTPATIENT
Start: 2020-09-16 | End: 2020-12-09

## 2020-09-16 NOTE — PROGRESS NOTES
HPI:    Patient ID: Cheng Lopez is a 63year old female. Presents with continued discomfort in right elbow region. .  At dialysis, nurses have altered position of blood pressure cuff, but this has not made a difference.   Patient notes increase  tablet 3   • DULoxetine HCl 30 MG Oral Cap DR Particles Take 1 capsule (30 mg total) by mouth daily. 30 capsule 3   • nystatin 111388 UNIT/GM External Cream Apply 1 Application topically 2 (two) times daily.  30 g 0   • Insulin Pen Needle (BD PEN NE hypertension  Type 2 diabetes mellitus without complication, with long-term current use of insulin (hcc)  Multiple myeloma in remission (hcc)  Lymphedema  Chronic renal failure, stage 5 (hcc)    Patient is having pain in right olecranon area--again consist

## 2020-09-17 ENCOUNTER — TELEPHONE (OUTPATIENT)
Dept: INTERNAL MEDICINE CLINIC | Facility: CLINIC | Age: 65
End: 2020-09-17

## 2020-09-17 NOTE — TELEPHONE ENCOUNTER
Order adjusted for Tibia + Fibula (2 views), right from [facility] to [facility] per MD written order.

## 2020-09-17 NOTE — TELEPHONE ENCOUNTER
Patient can have x-ray at any [place] site--she should be able to have them all at the same place.

## 2020-09-17 NOTE — TELEPHONE ENCOUNTER
[facility] / [facility] Scheduling [phone]    Patient is calling to schedule her X-Ray of the Tibia  The class is In-Site  She is questioning where Dr Unique Holt wants the patient to have the X-Ray done  Patient wants to go to [place]    There were 2 other orders ent

## 2020-09-21 ENCOUNTER — HOSPITAL ENCOUNTER (OUTPATIENT)
Dept: GENERAL RADIOLOGY | Facility: HOSPITAL | Age: 65
Discharge: HOME | End: 2020-09-21
Attending: INTERNAL MEDICINE
Payer: MEDICARE

## 2020-09-21 ENCOUNTER — HOSPITAL ENCOUNTER (OUTPATIENT)
Dept: ULTRASOUND IMAGING | Facility: HOSPITAL | Age: 65
Discharge: HOME | End: 2020-09-21
Attending: INTERNAL MEDICINE
Payer: MEDICARE

## 2020-09-21 DIAGNOSIS — I89.0 LYMPHEDEMA: ICD-10-CM

## 2020-09-21 DIAGNOSIS — M79.601 DIFFUSE PAIN IN RIGHT UPPER EXTREMITY: ICD-10-CM

## 2020-09-21 DIAGNOSIS — E11.9 TYPE 2 DIABETES MELLITUS WITHOUT COMPLICATION, WITH LONG-TERM CURRENT USE OF INSULIN (HCC): ICD-10-CM

## 2020-09-21 DIAGNOSIS — I10 ESSENTIAL HYPERTENSION: ICD-10-CM

## 2020-09-21 DIAGNOSIS — C90.01 MULTIPLE MYELOMA IN REMISSION (HCC): ICD-10-CM

## 2020-09-21 DIAGNOSIS — M79.604 ACUTE PAIN OF RIGHT LOWER EXTREMITY: ICD-10-CM

## 2020-09-21 DIAGNOSIS — N18.5 CHRONIC RENAL FAILURE, STAGE 5 (HCC): ICD-10-CM

## 2020-09-21 DIAGNOSIS — Z79.4 TYPE 2 DIABETES MELLITUS WITHOUT COMPLICATION, WITH LONG-TERM CURRENT USE OF INSULIN (HCC): ICD-10-CM

## 2020-09-21 PROCEDURE — 73590 X-RAY EXAM OF LOWER LEG: CPT | Performed by: INTERNAL MEDICINE

## 2020-09-21 PROCEDURE — 73080 X-RAY EXAM OF ELBOW: CPT | Performed by: INTERNAL MEDICINE

## 2020-09-21 PROCEDURE — 93971 EXTREMITY STUDY: CPT | Performed by: INTERNAL MEDICINE

## 2020-10-05 PROBLEM — C90.02 MULTIPLE MYELOMA IN RELAPSE (HCC): Status: ACTIVE | Noted: 2020-10-05

## 2020-10-06 ENCOUNTER — TELEPHONE (OUTPATIENT)
Dept: INTERNAL MEDICINE CLINIC | Facility: CLINIC | Age: 65
End: 2020-10-06

## 2020-10-06 NOTE — TELEPHONE ENCOUNTER
[facility]  Dexilant DR 60 mg   Plan requires a PA to cover more than 90 days per year    ID [id number], [phone]     Placed in purple folder

## 2020-10-14 ENCOUNTER — TELEPHONE (OUTPATIENT)
Dept: INTERNAL MEDICINE CLINIC | Facility: CLINIC | Age: 65
End: 2020-10-14

## 2020-10-14 NOTE — TELEPHONE ENCOUNTER
Spoke to [facility]. Her cancer has been in remission for year and a half, just found out on 10/1 that it has come back. She went for a PET scan this morning but they were unable to complete due to elevated blood sugar. Must be 200 or below.   Patient stating

## 2020-10-14 NOTE — TELEPHONE ENCOUNTER
Patient is calling to speak with a nurse. Patient was informed she had to get a P. E.T scan this morning at 0900. Patient was unable to take the test because her sugar was too high. Test was canceled.  Patient was informed that the office was going to get a

## 2020-10-14 NOTE — TELEPHONE ENCOUNTER
Spoke to [facility]. Relayed Dr Tone Bailey message to her. She verbalized understanding and will increase to 52 units of basaglar tonight. Patient not really wanting to see endocrinologist, see documentation from below.     To Deena---are you able to give any

## 2020-10-18 ENCOUNTER — HOSPITAL ENCOUNTER (EMERGENCY)
Facility: HOSPITAL | Age: 65
Discharge: HOME | End: 2020-10-18
Attending: EMERGENCY MEDICINE
Payer: MEDICARE

## 2020-10-18 ENCOUNTER — APPOINTMENT (OUTPATIENT)
Dept: GENERAL RADIOLOGY | Facility: HOSPITAL | Age: 65
End: 2020-10-18
Attending: EMERGENCY MEDICINE
Payer: MEDICARE

## 2020-10-18 VITALS
WEIGHT: 293 LBS | TEMPERATURE: 99 F | HEART RATE: 79 BPM | DIASTOLIC BLOOD PRESSURE: 128 MMHG | OXYGEN SATURATION: 96 % | BODY MASS INDEX: 45.99 KG/M2 | RESPIRATION RATE: 20 BRPM | SYSTOLIC BLOOD PRESSURE: 150 MMHG | HEIGHT: 67 IN

## 2020-10-18 DIAGNOSIS — S52.021A CLOSED FRACTURE OF OLECRANON PROCESS OF RIGHT ULNA, INITIAL ENCOUNTER: Primary | ICD-10-CM

## 2020-10-18 PROCEDURE — 29105 APPLICATION LONG ARM SPLINT: CPT

## 2020-10-18 PROCEDURE — 73060 X-RAY EXAM OF HUMERUS: CPT | Performed by: EMERGENCY MEDICINE

## 2020-10-18 PROCEDURE — 73090 X-RAY EXAM OF FOREARM: CPT | Performed by: EMERGENCY MEDICINE

## 2020-10-18 PROCEDURE — 99284 EMERGENCY DEPT VISIT MOD MDM: CPT

## 2020-10-18 RX ORDER — HYDROCODONE BITARTRATE AND ACETAMINOPHEN 5; 325 MG/1; MG/1
1 TABLET ORAL ONCE
Status: COMPLETED | OUTPATIENT
Start: 2020-10-18 | End: 2020-10-18

## 2020-10-18 NOTE — ED PROVIDER NOTES
Patient Seen in: [facility] AND [facility] Emergency Department      History   Patient presents with:  Pain    Stated Complaint: Elbow pain.      HPI    Patient is a 59-year-old female on dialysis and history of multiple myeloma who presents with significant righ and vital signs reviewed. All other systems reviewed and negative except as noted above.     Physical Exam     ED Triage Vitals   BP 10/18/20 1008 (!) 150/128   Pulse 10/18/20 0819 80   Resp 10/18/20 0819 18   Temp 10/18/20 0819 99.1 °F (37.3 °C)   Tem on this exam.   Dictated by (CST): Gwen Agee MD on 10/18/2020 at 9:23 AM     Finalized by (CST): Gwen Agee MD on 10/18/2020 at 9:25 AM          A long arm splint was applied to the right arm.   After application of the splint I returned and re-e

## 2020-10-18 NOTE — ED INITIAL ASSESSMENT (HPI)
Right elbow pain radiating up right arm. She states this pain has been going on x 2 months, but yesterday she thinks she twisted her arm wrong which exacerbated the pain. Hx of multiple myeloma with lactic lesions per patient.  Taking Norco without relie

## 2020-10-22 PROBLEM — C90.00 MYELOMA KIDNEY (HCC): Status: ACTIVE | Noted: 2020-10-22

## 2020-10-22 PROBLEM — M89.9 LYTIC BONE LESIONS ON XRAY: Status: ACTIVE | Noted: 2020-10-22

## 2020-10-22 PROBLEM — C90.02 MULTIPLE MYELOMA IN RELAPSE (HCC): Status: ACTIVE | Noted: 2020-10-22

## 2020-10-22 PROBLEM — C90.00 MYELOMA KIDNEY: Status: ACTIVE | Noted: 2020-10-22

## 2020-10-22 PROBLEM — N08 MYELOMA KIDNEY (HCC): Status: ACTIVE | Noted: 2020-10-22

## 2020-10-22 PROBLEM — C90.00: Status: ACTIVE | Noted: 2020-10-22

## 2020-10-22 PROBLEM — N08 MYELOMA KIDNEY: Status: ACTIVE | Noted: 2020-10-22

## 2020-10-22 PROBLEM — N08: Status: ACTIVE | Noted: 2020-10-22

## 2020-10-26 ENCOUNTER — TELEPHONE (OUTPATIENT)
Dept: INTERNAL MEDICINE CLINIC | Facility: CLINIC | Age: 65
End: 2020-10-26

## 2020-10-26 NOTE — TELEPHONE ENCOUNTER
Patient is calling to leave a message for Dr Unique Holt. Patient states she broke her right elbow a week ago Saturday. Saw Dr Chanel Eldridge (ortho) on Wednesday, 10/21/2020.  Patient was to supposed to get chemo therapy recently and Dr Chanel Eldridge did talk to patient's oncolo

## 2020-10-30 NOTE — TELEPHONE ENCOUNTER
I left message for patient. I see that patient was already able to contact orthopedics and in fact saw nurse practitioner yesterday.   Therefore I suggested that patient continue to follow through with current orthopedics and to notify our office if she ha

## 2020-11-09 ENCOUNTER — TELEPHONE (OUTPATIENT)
Dept: INTERNAL MEDICINE CLINIC | Facility: CLINIC | Age: 65
End: 2020-11-09

## 2020-11-09 RX ORDER — INSULIN GLARGINE 100 [IU]/ML
52 INJECTION, SOLUTION SUBCUTANEOUS EVERY EVENING
Qty: 15 PEN | Refills: 3 | Status: ON HOLD | OUTPATIENT
Start: 2020-11-09 | End: 2020-12-09

## 2020-11-09 RX ORDER — TRAMADOL HYDROCHLORIDE 50 MG/1
50 TABLET ORAL EVERY 6 HOURS PRN
Qty: 60 TABLET | Refills: 1 | Status: ON HOLD | OUTPATIENT
Start: 2020-11-09 | End: 2020-12-09

## 2020-11-09 NOTE — TELEPHONE ENCOUNTER
Pt. Called asking for refill on Tramadol. Also, she stated she was prescribed  32 units daily but she has been using 52 units. So she knows her insulin will not last her for the 3 months that is was originally prescribed for.    [facility]

## 2020-11-09 NOTE — TELEPHONE ENCOUNTER
Spoke to patient who reports she increased her basaglar insulin per Dr. Cleo Cavazos to 52 units/nightly. Dr. Cleo Cavazos advised she increase to 52 units because she needed her blood glucose at a certain level to complete the PET scan.  She reports she is doi

## 2020-11-13 ENCOUNTER — HOSPITAL ENCOUNTER (EMERGENCY)
Facility: HOSPITAL | Age: 65
Discharge: HOME | End: 2020-11-13
Attending: EMERGENCY MEDICINE
Payer: MEDICARE

## 2020-11-13 ENCOUNTER — APPOINTMENT (OUTPATIENT)
Dept: CT IMAGING | Facility: HOSPITAL | Age: 65
End: 2020-11-13
Attending: EMERGENCY MEDICINE
Payer: MEDICARE

## 2020-11-13 VITALS
WEIGHT: 293 LBS | SYSTOLIC BLOOD PRESSURE: 115 MMHG | TEMPERATURE: 99 F | RESPIRATION RATE: 20 BRPM | BODY MASS INDEX: 45.99 KG/M2 | HEART RATE: 86 BPM | OXYGEN SATURATION: 91 % | DIASTOLIC BLOOD PRESSURE: 61 MMHG | HEIGHT: 67 IN

## 2020-11-13 DIAGNOSIS — Z99.2 ESRD ON HEMODIALYSIS (HCC): ICD-10-CM

## 2020-11-13 DIAGNOSIS — M84.58XA PATHOLOGICAL FRACTURE OF OTHER SITE DUE TO NEOPLASTIC DISEASE, INITIAL ENCOUNTER: Primary | ICD-10-CM

## 2020-11-13 DIAGNOSIS — Z79.4 TYPE 2 DIABETES MELLITUS WITH HYPERGLYCEMIA, WITH LONG-TERM CURRENT USE OF INSULIN (HCC): ICD-10-CM

## 2020-11-13 DIAGNOSIS — E11.65 TYPE 2 DIABETES MELLITUS WITH HYPERGLYCEMIA, WITH LONG-TERM CURRENT USE OF INSULIN (HCC): ICD-10-CM

## 2020-11-13 DIAGNOSIS — N18.6 ESRD ON HEMODIALYSIS (HCC): ICD-10-CM

## 2020-11-13 PROCEDURE — 96374 THER/PROPH/DIAG INJ IV PUSH: CPT

## 2020-11-13 PROCEDURE — 85025 COMPLETE CBC W/AUTO DIFF WBC: CPT

## 2020-11-13 PROCEDURE — 96375 TX/PRO/DX INJ NEW DRUG ADDON: CPT

## 2020-11-13 PROCEDURE — 80048 BASIC METABOLIC PNL TOTAL CA: CPT

## 2020-11-13 PROCEDURE — 74176 CT ABD & PELVIS W/O CONTRAST: CPT | Performed by: EMERGENCY MEDICINE

## 2020-11-13 PROCEDURE — 80048 BASIC METABOLIC PNL TOTAL CA: CPT | Performed by: EMERGENCY MEDICINE

## 2020-11-13 PROCEDURE — 85025 COMPLETE CBC W/AUTO DIFF WBC: CPT | Performed by: EMERGENCY MEDICINE

## 2020-11-13 PROCEDURE — 71250 CT THORAX DX C-: CPT | Performed by: EMERGENCY MEDICINE

## 2020-11-13 PROCEDURE — 99285 EMERGENCY DEPT VISIT HI MDM: CPT

## 2020-11-13 RX ORDER — HYDROCODONE BITARTRATE AND ACETAMINOPHEN 10; 325 MG/1; MG/1
1-2 TABLET ORAL EVERY 6 HOURS PRN
Qty: 20 TABLET | Refills: 0 | Status: SHIPPED | OUTPATIENT
Start: 2020-11-13 | End: 2020-11-20

## 2020-11-13 RX ORDER — MORPHINE SULFATE 4 MG/ML
4 INJECTION, SOLUTION INTRAMUSCULAR; INTRAVENOUS ONCE
Status: COMPLETED | OUTPATIENT
Start: 2020-11-13 | End: 2020-11-13

## 2020-11-13 RX ORDER — ORPHENADRINE CITRATE 100 MG/1
100 TABLET, EXTENDED RELEASE ORAL 2 TIMES DAILY
Qty: 20 TABLET | Refills: 0 | Status: SHIPPED | OUTPATIENT
Start: 2020-11-13 | End: 2020-11-20

## 2020-11-13 RX ORDER — ONDANSETRON 2 MG/ML
4 INJECTION INTRAMUSCULAR; INTRAVENOUS ONCE
Status: COMPLETED | OUTPATIENT
Start: 2020-11-13 | End: 2020-11-13

## 2020-11-13 NOTE — ED NOTES
Pt started on oral chemo taken daily for 21 days currently on day 4 as treatment for multiple myeolma. Pt reports also on hemodialysis with fistula left lower arm with last dialysis Thursday 11/12.  Pt c/o right sided flank pain but also right leg and arm w

## 2020-11-15 NOTE — ED PROVIDER NOTES
Patient Seen in: [facility] Emergency Department      History   Patient presents with:  Abdomen/Flank Pain    Stated Complaint: R sided abd pain, sent by oncologist     HPI    63year old female with h/o dm, esrd, htn, lymphedema LE, obesity, and stated complaint: R sided abd pain, sent by oncologist   Other systems are as noted in HPI. Constitutional and vital signs reviewed. All other systems reviewed and negative except as noted above.     Physical Exam     ED Triage Vitals   BP 11/13/20 10 - Abnormal; Notable for the following components:       Result Value    Glucose 219 (*)     Sodium 135 (*)     BUN 41 (*)     Creatinine 5.85 (*)     BUN/CREA Ratio 7.0 (*)     Calcium, Total 7.1 (*)     Calculated Osmolality 297 (*)     GFR, Non- A left hemidiaphragm with atelectasis and/or scar in the lingula and left lower lobe. No evidence of pneumonia or other acute finding. 5. Enlarged main pulmonary artery compatible with pulmonary hypertension.  6. Atherosclerotic vascular calcification includ possible for a visit  Call for next available appointment    We recommend that you schedule follow up care with a primary care provider within the next three months to obtain basic health screening including reassessment of your blood pressure.       [facility]

## 2020-11-16 ENCOUNTER — TELEPHONE (OUTPATIENT)
Dept: INTERNAL MEDICINE CLINIC | Facility: CLINIC | Age: 65
End: 2020-11-16

## 2020-11-16 NOTE — TELEPHONE ENCOUNTER
I spoke with daughter,. Pt was in ER on 11/13--sent home with pain meds  Pt taken to [facility] secondary to extreme pain from multiple myeloma. Suggested that pt await evaluation by hospital oncologist.  Daughter understands and agrees.

## 2020-11-16 NOTE — TELEPHONE ENCOUNTER
Patient daughter Aidan Spine calling. Asking for Dr. Anayeli Toro to call her back. Patient has been brought to [facility] [place], operated by [facility] ED by ambulance. Called ambulance because patient unable to get up due to multiple broken bones, hasn't used restroom for 72 hours.   Hasn't had d

## 2020-11-17 ENCOUNTER — MED REC SCAN ONLY (OUTPATIENT)
Dept: INTERNAL MEDICINE CLINIC | Facility: CLINIC | Age: 65
End: 2020-11-17

## 2020-11-20 NOTE — TELEPHONE ENCOUNTER
Patient's daughter, Fiona Saucedo, is calling to speak with Dr Osvaldo Flood. Fiona Saucedo is hoping to get some information on the patient, as the patient is at [facility] still and the daughter is not getting all the information at this time.      Best call back: [phone]

## 2020-11-30 ENCOUNTER — TELEPHONE (OUTPATIENT)
Dept: INTERNAL MEDICINE CLINIC | Facility: CLINIC | Age: 65
End: 2020-11-30

## 2020-11-30 DIAGNOSIS — N18.6 ESRD (END STAGE RENAL DISEASE) (HCC): ICD-10-CM

## 2020-11-30 DIAGNOSIS — C90.01 MULTIPLE MYELOMA IN REMISSION (HCC): Primary | ICD-10-CM

## 2020-11-30 NOTE — TELEPHONE ENCOUNTER
Please call pt daughter, Stella Bragg  Pt was in hospital last week and then transferred to rehab, [facility] been seen by a physician there yet  Not sharing anything with daughter  Please call Milla to discuss/advise  Tasked to nursing

## 2020-11-30 NOTE — TELEPHONE ENCOUNTER
S.w patients daughter Brett Camarena. Brett Camarena states that patient was discharged from [facility] 11/24 and transferred to [address] [place] [address] for PT. Daughter is unhappy with services at [address] [place] [address].  Reports lack in communication, unclear plan, failure to schedule/arrange appt wi

## 2020-11-30 NOTE — TELEPHONE ENCOUNTER
Rox Mcclure is in assisted living home from what I understand that is a locked facility with substance abuse patients. Most the time psych issues and drug use patients, they will not exchange information with any family members.   I do not know how we can assist,

## 2020-11-30 NOTE — TELEPHONE ENCOUNTER
Relayed MDs message to daughter Memo Guzman understanding. She will reach out to [facility] to have them fax over home health orders to be signed.

## 2020-12-01 ENCOUNTER — TELEPHONE (OUTPATIENT)
Dept: INTERNAL MEDICINE CLINIC | Facility: CLINIC | Age: 65
End: 2020-12-01

## 2020-12-01 NOTE — TELEPHONE ENCOUNTER
Leisa @ [facility]     Patient being discharged, will Dr Ashley Godfrey sign orders.      Best call back: [phone]

## 2020-12-01 NOTE — TELEPHONE ENCOUNTER
Attempted to reach Leisa/[facility] but was disconnected x3. LM for Manager at [facility] to call back.

## 2020-12-02 ENCOUNTER — HOSPITAL ENCOUNTER (INPATIENT)
Facility: HOSPITAL | Age: 65
LOS: 7 days | Discharge: NURSING, REHAB OR LONG-TERM CARE | DRG: 640 | End: 2020-12-09
Attending: EMERGENCY MEDICINE | Admitting: INTERNAL MEDICINE
Payer: MEDICARE

## 2020-12-02 ENCOUNTER — HOSPITAL ENCOUNTER (INPATIENT)
Facility: HOSPITAL | Age: 65
LOS: 7 days | Discharge: NURSING, REHAB OR LONG-TERM CARE | DRG: 682 | End: 2020-12-09
Attending: EMERGENCY MEDICINE | Admitting: INTERNAL MEDICINE
Payer: MEDICARE

## 2020-12-02 DIAGNOSIS — Z99.2 DIALYSIS PATIENT (HCC): ICD-10-CM

## 2020-12-02 DIAGNOSIS — D64.9 ANEMIA, UNSPECIFIED TYPE: ICD-10-CM

## 2020-12-02 DIAGNOSIS — R62.51 FAILURE TO THRIVE (0-17): Primary | ICD-10-CM

## 2020-12-02 LAB
ANION GAP SERPL CALC-SCNC: 10 MMOL/L (ref 0–18)
BASOPHILS # BLD AUTO: 0.18 X10(3) UL (ref 0–0.2)
BASOPHILS NFR BLD AUTO: 1.6 %
BUN BLD-MCNC: 53 MG/DL (ref 7–18)
BUN/CREAT SERPL: 5.7 (ref 10–20)
CALCIUM BLD-MCNC: 6.2 MG/DL (ref 8.5–10.1)
CHLORIDE SERPL-SCNC: 99 MMOL/L (ref 98–112)
CO2 SERPL-SCNC: 25 MMOL/L (ref 21–32)
CREAT BLD-MCNC: 9.24 MG/DL
DEPRECATED RDW RBC AUTO: 56.6 FL (ref 35.1–46.3)
EOSINOPHIL # BLD AUTO: 0.72 X10(3) UL (ref 0–0.7)
EOSINOPHIL NFR BLD AUTO: 6.4 %
ERYTHROCYTE [DISTWIDTH] IN BLOOD BY AUTOMATED COUNT: 16.5 % (ref 11–15)
EST. AVERAGE GLUCOSE BLD GHB EST-MCNC: 209 MG/DL (ref 68–126)
GLUCOSE BLD-MCNC: 260 MG/DL (ref 70–99)
GLUCOSE BLDC GLUCOMTR-MCNC: 189 MG/DL (ref 70–99)
GLUCOSE BLDC GLUCOMTR-MCNC: 216 MG/DL (ref 70–99)
HAV IGM SER QL: 2.2 MG/DL (ref 1.6–2.6)
HBA1C MFR BLD HPLC: 8.9 % (ref ?–5.7)
HCT VFR BLD AUTO: 25.3 %
HGB BLD-MCNC: 8 G/DL
IMM GRANULOCYTES # BLD AUTO: 0.26 X10(3) UL (ref 0–1)
IMM GRANULOCYTES NFR BLD: 2.3 %
LYMPHOCYTES # BLD AUTO: 1.29 X10(3) UL (ref 1–4)
LYMPHOCYTES NFR BLD AUTO: 11.4 %
MCH RBC QN AUTO: 31.5 PG (ref 26–34)
MCHC RBC AUTO-ENTMCNC: 31.6 G/DL (ref 31–37)
MCV RBC AUTO: 99.6 FL
MONOCYTES # BLD AUTO: 1.53 X10(3) UL (ref 0.1–1)
MONOCYTES NFR BLD AUTO: 13.5 %
NEUTROPHILS # BLD AUTO: 7.32 X10 (3) UL (ref 1.5–7.7)
NEUTROPHILS # BLD AUTO: 7.32 X10(3) UL (ref 1.5–7.7)
NEUTROPHILS NFR BLD AUTO: 64.8 %
OSMOLALITY SERPL CALC.SUM OF ELEC: 301 MOSM/KG (ref 275–295)
PLATELET # BLD AUTO: 105 10(3)UL (ref 150–450)
POTASSIUM SERPL-SCNC: 4.6 MMOL/L (ref 3.5–5.1)
RBC # BLD AUTO: 2.54 X10(6)UL
SARS-COV-2 RNA RESP QL NAA+PROBE: NOT DETECTED
SODIUM SERPL-SCNC: 134 MMOL/L (ref 136–145)
WBC # BLD AUTO: 11.3 X10(3) UL (ref 4–11)

## 2020-12-02 RX ORDER — MORPHINE SULFATE 4 MG/ML
4 INJECTION, SOLUTION INTRAMUSCULAR; INTRAVENOUS ONCE
Status: COMPLETED | OUTPATIENT
Start: 2020-12-02 | End: 2020-12-02

## 2020-12-02 RX ORDER — MONTELUKAST SODIUM 10 MG/1
10 TABLET ORAL NIGHTLY
COMMUNITY
End: 2021-03-08

## 2020-12-02 RX ORDER — ORPHENADRINE CITRATE 100 MG/1
100 TABLET, EXTENDED RELEASE ORAL 2 TIMES DAILY
Status: ON HOLD | COMMUNITY
End: 2020-12-09

## 2020-12-02 RX ORDER — MORPHINE SULFATE 4 MG/ML
INJECTION, SOLUTION INTRAMUSCULAR; INTRAVENOUS
Status: COMPLETED
Start: 2020-12-02 | End: 2020-12-02

## 2020-12-02 RX ORDER — ACYCLOVIR 200 MG/1
200 CAPSULE ORAL 2 TIMES DAILY
Status: DISCONTINUED | OUTPATIENT
Start: 2020-12-02 | End: 2020-12-09

## 2020-12-02 RX ORDER — ALBUMIN (HUMAN) 12.5 G/50ML
100 SOLUTION INTRAVENOUS AS NEEDED
Status: DISCONTINUED | OUTPATIENT
Start: 2020-12-03 | End: 2020-12-09

## 2020-12-02 RX ORDER — HEPARIN SODIUM 5000 [USP'U]/ML
5000 INJECTION, SOLUTION INTRAVENOUS; SUBCUTANEOUS EVERY 8 HOURS SCHEDULED
Status: DISCONTINUED | OUTPATIENT
Start: 2020-12-02 | End: 2020-12-09

## 2020-12-02 RX ORDER — METOCLOPRAMIDE HYDROCHLORIDE 5 MG/ML
5 INJECTION INTRAMUSCULAR; INTRAVENOUS ONCE
Status: COMPLETED | OUTPATIENT
Start: 2020-12-02 | End: 2020-12-02

## 2020-12-02 RX ORDER — ROSUVASTATIN CALCIUM 5 MG/1
5 TABLET, COATED ORAL NIGHTLY
Status: DISCONTINUED | OUTPATIENT
Start: 2020-12-02 | End: 2020-12-09

## 2020-12-02 RX ORDER — LENALIDOMIDE 5 MG/1
5 CAPSULE ORAL ONCE
Status: COMPLETED | OUTPATIENT
Start: 2020-12-02 | End: 2020-12-02

## 2020-12-02 RX ORDER — MONTELUKAST SODIUM 10 MG/1
10 TABLET ORAL NIGHTLY
Status: DISCONTINUED | OUTPATIENT
Start: 2020-12-02 | End: 2020-12-09

## 2020-12-02 RX ORDER — DEXTROSE MONOHYDRATE 25 G/50ML
50 INJECTION, SOLUTION INTRAVENOUS
Status: DISCONTINUED | OUTPATIENT
Start: 2020-12-02 | End: 2020-12-09

## 2020-12-02 RX ORDER — HYDROCODONE BITARTRATE AND ACETAMINOPHEN 10; 325 MG/1; MG/1
1 TABLET ORAL EVERY 4 HOURS PRN
Status: DISCONTINUED | OUTPATIENT
Start: 2020-12-02 | End: 2020-12-09

## 2020-12-02 NOTE — TELEPHONE ENCOUNTER
I located fax.  On Dr Romina Goldstein desjimena  The fax is a medication review report from her stay at [facility], asks for 's signature    To Dr Rashaad Fisher

## 2020-12-02 NOTE — ED NOTES
Pt states last received dialysis on Monday. States she kept passing out and the staff was throwing cold water on her to wake her up.

## 2020-12-02 NOTE — ED NOTES
Pt's daughter at the bedside. Verbalizes her desire to meet with case management/social work to hopefully have her mother set up with the equipment required, and home health care, so that family can care for the patient at home.   At this time they would n

## 2020-12-02 NOTE — ED PROVIDER NOTES
Patient Seen in: [facility] AND [facility] Emergency Department      History   Patient presents with:  Syncope  Fatigue    Stated Complaint: syncope, weakness    HPI    61-year-old female here with her daughter helps take care of her extensive medical history in Never Used    Alcohol use: No      Alcohol/week: 0.0 standard drinks    Drug use: No             Review of Systems    Positive for stated complaint: syncope, weakness  Other systems are as noted in HPI. Constitutional and vital signs reviewed.       All ot 260 (*)     Sodium 134 (*)     BUN 53 (*)     Creatinine 9.24 (*)     BUN/CREA Ratio 5.7 (*)     Calcium, Total 6.2 (*)     Calculated Osmolality 301 (*)     GFR, Non- 4 (*)     GFR, -American 5 (*)     All other components within no 4:37 pm    Follow-up:  No follow-up provider specified. We recommend that you schedule follow up care with a primary care provider within the next three months to obtain basic health screening including reassessment of your blood pressure.       Medication

## 2020-12-02 NOTE — TELEPHONE ENCOUNTER
Chayo from [facility] called back. She states patient left their facility, [facility]. Pt wants to follow up with her primary doctor.   Pradip Lara stating she sent over a list of medications that patient needs refilled and also orders for her to get h

## 2020-12-02 NOTE — ED NOTES
Orders for admission, patient is aware of plan and ready to go upstairs. Any questions, please call ED JOEY Truong  at extension [phone].    Type of COVID test sent: Rapid  COVID Suspicion level: Low    Titratable drug(s) infusing: n/a  Rate: n/a    LOC at time o

## 2020-12-02 NOTE — ED INITIAL ASSESSMENT (HPI)
Pt arrived per family. Pt was admitted to [facility] and sent to rehab. Family states she has been very weak and fatigued. States is a cancer patient and dialysis pt. Family/pt states they feel like she was being taken care of.  Has several breaks and fractures

## 2020-12-03 LAB
ALBUMIN SERPL-MCNC: 2.6 G/DL (ref 3.4–5)
ALBUMIN/GLOB SERPL: 0.7 {RATIO} (ref 1–2)
ALP LIVER SERPL-CCNC: 93 U/L
ALT SERPL-CCNC: 10 U/L
ANION GAP SERPL CALC-SCNC: 9 MMOL/L (ref 0–18)
AST SERPL-CCNC: 9 U/L (ref 15–37)
BASOPHILS # BLD AUTO: 0.22 X10(3) UL (ref 0–0.2)
BASOPHILS NFR BLD AUTO: 2.2 %
BILIRUB SERPL-MCNC: 0.5 MG/DL (ref 0.1–2)
BUN BLD-MCNC: 57 MG/DL (ref 7–18)
BUN/CREAT SERPL: 5.4 (ref 10–20)
CALCIUM BLD-MCNC: 5.8 MG/DL (ref 8.5–10.1)
CHLORIDE SERPL-SCNC: 101 MMOL/L (ref 98–112)
CO2 SERPL-SCNC: 26 MMOL/L (ref 21–32)
CREAT BLD-MCNC: 10.5 MG/DL
DEPRECATED RDW RBC AUTO: 55.3 FL (ref 35.1–46.3)
EOSINOPHIL # BLD AUTO: 0.84 X10(3) UL (ref 0–0.7)
EOSINOPHIL NFR BLD AUTO: 8.5 %
ERYTHROCYTE [DISTWIDTH] IN BLOOD BY AUTOMATED COUNT: 16.2 % (ref 11–15)
GLOBULIN PLAS-MCNC: 3.8 G/DL (ref 2.8–4.4)
GLUCOSE BLD-MCNC: 139 MG/DL (ref 70–99)
GLUCOSE BLDC GLUCOMTR-MCNC: 133 MG/DL (ref 70–99)
GLUCOSE BLDC GLUCOMTR-MCNC: 150 MG/DL (ref 70–99)
GLUCOSE BLDC GLUCOMTR-MCNC: 178 MG/DL (ref 70–99)
GLUCOSE BLDC GLUCOMTR-MCNC: 243 MG/DL (ref 70–99)
HCT VFR BLD AUTO: 23.1 %
HGB BLD-MCNC: 7.1 G/DL
IMM GRANULOCYTES # BLD AUTO: 0.29 X10(3) UL (ref 0–1)
IMM GRANULOCYTES NFR BLD: 2.9 %
LYMPHOCYTES # BLD AUTO: 1.23 X10(3) UL (ref 1–4)
LYMPHOCYTES NFR BLD AUTO: 12.5 %
M PROTEIN MFR SERPL ELPH: 6.4 G/DL (ref 6.4–8.2)
MCH RBC QN AUTO: 30.7 PG (ref 26–34)
MCHC RBC AUTO-ENTMCNC: 30.7 G/DL (ref 31–37)
MCV RBC AUTO: 100 FL
MONOCYTES # BLD AUTO: 1.36 X10(3) UL (ref 0.1–1)
MONOCYTES NFR BLD AUTO: 13.8 %
NEUTROPHILS # BLD AUTO: 5.9 X10 (3) UL (ref 1.5–7.7)
NEUTROPHILS # BLD AUTO: 5.9 X10(3) UL (ref 1.5–7.7)
NEUTROPHILS NFR BLD AUTO: 60.1 %
OSMOLALITY SERPL CALC.SUM OF ELEC: 300 MOSM/KG (ref 275–295)
PLATELET # BLD AUTO: 107 10(3)UL (ref 150–450)
POTASSIUM SERPL-SCNC: 4.2 MMOL/L (ref 3.5–5.1)
RBC # BLD AUTO: 2.31 X10(6)UL
SODIUM SERPL-SCNC: 136 MMOL/L (ref 136–145)
WBC # BLD AUTO: 9.8 X10(3) UL (ref 4–11)

## 2020-12-03 PROCEDURE — 5A1D70Z PERFORMANCE OF URINARY FILTRATION, INTERMITTENT, LESS THAN 6 HOURS PER DAY: ICD-10-PCS | Performed by: INTERNAL MEDICINE

## 2020-12-03 PROCEDURE — 99214 OFFICE O/P EST MOD 30 MIN: CPT | Performed by: INTERNAL MEDICINE

## 2020-12-03 RX ORDER — DOCUSATE SODIUM 100 MG/1
100 CAPSULE, LIQUID FILLED ORAL 2 TIMES DAILY
Status: DISCONTINUED | OUTPATIENT
Start: 2020-12-03 | End: 2020-12-09

## 2020-12-03 RX ORDER — ALBUMIN (HUMAN) 12.5 G/50ML
100 SOLUTION INTRAVENOUS
Status: DISCONTINUED | OUTPATIENT
Start: 2020-12-05 | End: 2020-12-09

## 2020-12-03 RX ORDER — POLYETHYLENE GLYCOL 3350 17 G/17G
17 POWDER, FOR SOLUTION ORAL DAILY PRN
Status: DISCONTINUED | OUTPATIENT
Start: 2020-12-03 | End: 2020-12-09

## 2020-12-03 RX ORDER — BISACODYL 10 MG
10 SUPPOSITORY, RECTAL RECTAL
Status: DISCONTINUED | OUTPATIENT
Start: 2020-12-03 | End: 2020-12-09

## 2020-12-03 NOTE — CONSULTS
Hematology/Oncology Consult Note        NAME: Katherine Obregon - ROOM: 93575-G - MRN: [id number] - Age: 63year old - : 3/7/1955    Reason for Consult:  Multiple Myeloma    Patient is a a 63 y.o female well known to me for the management of mu Insulin Aspart Pen  1-11 Units Subcutaneous TID CC   • Rosuvastatin Calcium  5 mg Oral Nightly   • Heparin Sodium (Porcine)  5,000 Units Subcutaneous Q8H [address]   • Miconazole Nitrate   Topical [email or web address]       ALLERGIES:   Iodine (Topical)        HIVES  R Anemia  - secondary to MM, renal failure  - transfuse to keep hgb > 7     Dispo: home with home health pending progress   Will follow along   Thank you for allowing me to participate in the care of this patient, please call with any questions.     Surek

## 2020-12-03 NOTE — PHYSICAL THERAPY NOTE
PHYSICAL THERAPY EVALUATION - INPATIENT     Room Number: 473/473-A  Evaluation Date: 12/3/2020  Type of Evaluation: Initial   Physician Order: PT Eval and Treat    Presenting Problem: failure to thrive  Reason for Therapy: Mobility Dysfunction and Dischar hour care/supervision;Home with home health PT    PLAN  PT Treatment Plan: Bed mobility; Body mechanics; Patient education;Gait training;Transfer training;Balance training;Strengthening  Rehab Potential : Fair  Frequency (Obs): 5x/week       PHYSICAL THERAPY yeyo\"    PHYSICAL THERAPY EXAMINATION     OBJECTIVE  Precautions: Limb alert - right(s/p R elbow ORIF)  Fall Risk: High fall risk    WEIGHT BEARING RESTRICTION  Weight Bearing Restriction: R upper extremity  R Upper Extremity: Non-Weight Bearing 12/20/20  Patient Goal Patient's self-stated goal is: to go home   Goal #1 Patient is able to demonstrate supine - sit EOB @ level: supervision     Goal #1   Current Status    Goal #2 Patient is able to demonstrate transfers Sit to/from Stand at assistance

## 2020-12-03 NOTE — PLAN OF CARE
Problem: Patient Centered Care  Goal: Patient preferences are identified and integrated in the patient's plan of care  Description: Interventions:  - What would you like us to know as we care for you?  I want to go home w/ my family   - Provide timely, co hypoglycemia  - Administer ordered medications to maintain glucose within target range  - Assess barriers to adequate nutritional intake and initiate nutrition consult as needed  - Instruct patient on self management of diabetes  Outcome: Progressing     P SKIN/TISSUE INTEGRITY - ADULT  Goal: Skin integrity remains intact  Description: INTERVENTIONS  - Assess and document risk factors for pressure ulcer development  - Assess and document skin integrity  - Monitor for areas of redness and/or skin breakdown  -

## 2020-12-03 NOTE — PLAN OF CARE
Problem: Patient Centered Care  Goal: Patient preferences are identified and integrated in the patient's plan of care  Description: Interventions:  - What would you like us to know as we care for you?  I want to go home w/ my family   - Provide timely, co safety including physical limitations  - Instruct pt to call for assistance with activity based on assessment  - Modify environment to reduce risk of injury  - Provide assistive devices as appropriate  - Consider OT/PT consult to assist with strengthening/ Assess and document risk factors for pressure ulcer development  - Assess and document skin integrity  - Monitor for areas of redness and/or skin breakdown  - Initiate interventions, skin care algorithm/standards of care as needed  Outcome: Progressing

## 2020-12-03 NOTE — OCCUPATIONAL THERAPY NOTE
OCCUPATIONAL THERAPY EVALUATION - INPATIENT     Room Number: 473/473-A  Evaluation Date: 12/3/2020  Type of Evaluation: Initial  Presenting Problem: (failure to thrive)    Physician Order: IP Consult to Occupational Therapy  Reason for Therapy: ADL/IADL Dy session pt returned to bed and left w/ all needs in reach in preparation for dialysis. Dialysis tech at bedside.  RN aware of pt's performance in therapy and discharge recommendation    The patient's Approx Degree of Impairment: 59.67% has been calculated b FRACTURE SURGERY     •      • OTHER      T and A   • OTHER      C section x 2   • OTHER      ovarian cyst   • OTHER SURGICAL HISTORY      ? RSO for cyst   • OTHER SURGICAL HISTORY      lymphedema surgery   • TONSILLECTOMY     • TUBAL LIGATION rinsing, drying)?: A Lot  -   Toileting, which includes using toilet, bedpan or urinal? : A Lot  -   Putting on and taking off regular upper body clothing?: A Lot  -   Taking care of personal grooming such as brushing teeth?: A Little  -   Eating meals?: A

## 2020-12-03 NOTE — PROGRESS NOTES
Pt. Arrived to the unit at [address] with daughter. Oriented to unit safety plan of care. Pt. Alert x3. Max assistance with sling. History given by daughter. Medications verified with daughter who had meds present. Meds sent home with daughter.  MD paged about a

## 2020-12-03 NOTE — CONSULTS
[facility]    Report of Consultation    Carolina Obregon Patient Status:  Inpatient    3/7/1955 MRN [id number]   Location [facility] 4W/SW/SE Attending Blanca Metcalf MD   Hosp Day # 1 PCP Lake Campos MD     Date N/A 2018    Performed by Ludy Lopez MD at [facility] ENDOSCOPY   • FRACTURE SURGERY     •      • OTHER      T and A   • OTHER      C section x 2   • OTHER      ovarian cyst   • OTHER SURGICAL HISTORY      ? RSO for cyst   • OTHER SURGICAL days 1-21 of a 28 day cycle. PA # [id number]    •  insulin glargine (BASAGLAR KWIKPEN) 100 UNIT/ML Subcutaneous Solution Pen-injector, Inject 52 Units into the skin every evening. •  [] Lenalidomide 5 MG Oral Cap, On days 1-21 of a 28 day cycle.  On Particles, Take 1 capsule (30 mg total) by mouth daily. •  nystatin 580737 UNIT/GM External Cream, Apply 1 Application topically 2 (two) times daily.         Allergies    Iodine (Topical)        HIVES  Radiology Contrast *    HIVES  Sulfa Antibiotics new HD unit, LYLE to help    2 - Myeloma/pathological fx  Follows with Dr Maura Donovan    3 - hypocalcemia  Monitor for now.   HD should correct  Consider vitamin D, but check labs in AM first    4 - anemia  Check iron stores  Start SKYLAR    5 - DM 2  SSI      Th

## 2020-12-03 NOTE — H&P
[address] Patient Status:  Inpatient    3/7/1955 MRN [id number]   Location [facility] 4W/SW/SE Attending Soha Sevilla MD   Hosp Day # 1 PCP Meli Nelson MD     Date:  15 presentation this morning, patient states that she is feeling OK. Has some poor appetite but finally got a good night's sleep. Was very distraught with the care she had been receiving at her prior facility.  Family picked her up and took her home due to her Iodine (Topical)        HIVES  Radiology Contrast *    HIVES  Sulfa Antibiotics       RASH  Tetanus Toxoid          RASH  Vancomycin              RASH  Seasonal                  Tetanus Toxoids           Vancomycin                  •  Lenalidomide (REVLI Tab, Take 210 mg by mouth daily. •  Rosuvastatin Calcium 5 MG Oral Tab, Take 1 tablet (5 mg total) by mouth nightly. •  furosemide 80 MG Oral Tab, Take 80 mg by mouth daily.       •  DICYCLOMINE HCL 20 MG Oral Tab, TAKE 1 TABLET BY MOUTH TWICE A DAY Constitutional: No acute distress. Alert and oriented x 3.   Eyes: EOMI, PERRLA, clear sclera b/l  HENT: NCAT, Moist mucous membranes, Oropharynx without erythema or exudates  Neck: No JVD, no thyromegaly  Cardiovascular: S1, S2, no S3, no S4, Regular rat COMMENT    COMMENT: Histologic sections show bone with extensive infiltration by plasma   cells. Plasma cells are positive for . Kappa/lambda JANES showed plasma cells kappa light chain restricted.     X-ray of the right humerus 11/18/2020  IMPRESSION: to treat with Revlimid (lenalidomide) and Velcade (bortezomib) to start date 8–28 on each cycle with adjust regimen to allow week for break.   Dexamethasone 40 mg on Mondays.  -Consulted Heme/Onc: Dr. Christopher Smith to evaluate and maintain chemo Rx  –Daily CBC 30 mg daily    Disposition  Daughter would like to hold off on placement in subacute rehab facility at this time. We will request PT/OT/social work to assist in appropriate discharge disposition.   Daughter is open to home health services with appropriate

## 2020-12-03 NOTE — CM/SW NOTE
12/3: Received MDO for home health. PT/OT recommending home health. Patient discussed in rounds, patient recently in rehab but family took her out early. Met with patient alone at bedside.  Patient states she typically lives with her daughter Mati [place] in [place] Spoke with with Stephanie [facility]/Mgr ([phone]), she is aware of patient's request to switch & confirms only has a MWF 3pm chairtime. Per LYLE Brown to make referral to [facility] central intake. Clinicals sent to [facility] via [address]  Requested Hep B pr wheelchair that is provided. (  ) D. Use of the wheelchair will significantly improve the beneficiary's ability to participates in MRADL's and beneficiary will use it on a regular basis in the home. (  ) E.  The beneficiary has not expressed an unwillin [address], [address]

## 2020-12-04 LAB
ALBUMIN SERPL-MCNC: 2.7 G/DL (ref 3.4–5)
ANION GAP SERPL CALC-SCNC: 8 MMOL/L (ref 0–18)
BASOPHILS # BLD AUTO: 0.24 X10(3) UL (ref 0–0.2)
BASOPHILS NFR BLD AUTO: 2.6 %
BUN BLD-MCNC: 29 MG/DL (ref 7–18)
BUN/CREAT SERPL: 4.2 (ref 10–20)
CALCIUM BLD-MCNC: 6.5 MG/DL (ref 8.5–10.1)
CHLORIDE SERPL-SCNC: 103 MMOL/L (ref 98–112)
CO2 SERPL-SCNC: 26 MMOL/L (ref 21–32)
CREAT BLD-MCNC: 6.88 MG/DL
DEPRECATED RDW RBC AUTO: 55.8 FL (ref 35.1–46.3)
EOSINOPHIL # BLD AUTO: 0.64 X10(3) UL (ref 0–0.7)
EOSINOPHIL NFR BLD AUTO: 6.8 %
ERYTHROCYTE [DISTWIDTH] IN BLOOD BY AUTOMATED COUNT: 16.5 % (ref 11–15)
GLUCOSE BLD-MCNC: 153 MG/DL (ref 70–99)
GLUCOSE BLDC GLUCOMTR-MCNC: 160 MG/DL (ref 70–99)
GLUCOSE BLDC GLUCOMTR-MCNC: 160 MG/DL (ref 70–99)
GLUCOSE BLDC GLUCOMTR-MCNC: 194 MG/DL (ref 70–99)
GLUCOSE BLDC GLUCOMTR-MCNC: 204 MG/DL (ref 70–99)
HBV CORE AB SERPL QL IA: NONREACTIVE
HBV SURFACE AB SER QL: NONREACTIVE
HBV SURFACE AB SERPL IA-ACNC: <3.1 MIU/ML
HBV SURFACE AG SER-ACNC: <0.1 [IU]/L
HBV SURFACE AG SERPL QL IA: NONREACTIVE
HCT VFR BLD AUTO: 25.5 %
HGB BLD-MCNC: 7.7 G/DL
IMM GRANULOCYTES # BLD AUTO: 0.24 X10(3) UL (ref 0–1)
IMM GRANULOCYTES NFR BLD: 2.6 %
LYMPHOCYTES # BLD AUTO: 1.3 X10(3) UL (ref 1–4)
LYMPHOCYTES NFR BLD AUTO: 13.9 %
MCH RBC QN AUTO: 30.4 PG (ref 26–34)
MCHC RBC AUTO-ENTMCNC: 30.2 G/DL (ref 31–37)
MCV RBC AUTO: 100.8 FL
MONOCYTES # BLD AUTO: 1.39 X10(3) UL (ref 0.1–1)
MONOCYTES NFR BLD AUTO: 14.9 %
NEUTROPHILS # BLD AUTO: 5.55 X10 (3) UL (ref 1.5–7.7)
NEUTROPHILS # BLD AUTO: 5.55 X10(3) UL (ref 1.5–7.7)
NEUTROPHILS NFR BLD AUTO: 59.2 %
OSMOLALITY SERPL CALC.SUM OF ELEC: 293 MOSM/KG (ref 275–295)
PHOSPHATE SERPL-MCNC: 4.2 MG/DL (ref 2.5–4.9)
PLATELET # BLD AUTO: 97 10(3)UL (ref 150–450)
POTASSIUM SERPL-SCNC: 4.2 MMOL/L (ref 3.5–5.1)
RBC # BLD AUTO: 2.53 X10(6)UL
SODIUM SERPL-SCNC: 137 MMOL/L (ref 136–145)
WBC # BLD AUTO: 9.4 X10(3) UL (ref 4–11)

## 2020-12-04 PROCEDURE — 99213 OFFICE O/P EST LOW 20 MIN: CPT | Performed by: INTERNAL MEDICINE

## 2020-12-04 NOTE — PROGRESS NOTES
[facility]    Progress Note    Hartford Valerio Obregon Patient Status:  Inpatient    3/7/1955 MRN [id number]   Location [facility] 4W/SW/SE Attending Polly Oneal MD   Hosp Day # 2 PCP Angel Barcenas MD     Subjective: Headache, Falls  Psych:  Anxiety, Depression, Insomnia, Suicidal Ideation, Homicidal ideation, Memory Changes  Heme/Lymph: Bruising, Bleeding, Lymphadenopathy  Endocrine: Polyuria, Polydipsia, Temperature Intolerance    Objective:   Vital Signs:  Blood pres failure. 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 Received CYBORD with Velcade maintenance therapy. Skeletal survey from 10/2020 showed new bone lesions concerning for relapsed disease. Pathologic fracture in the right arm on 10/27/2020. evening  -Accu-Cheks  –Hypoglycemia protocol  –Diabetic diet     hyperlipidemia  Home medications: Rosuvastatin 5 mg nightly  - C/w home medications     Chronic constipation  - Patient having BMs  –Aggressive bowel regimen     Chronic lymphedema  Home medi access between rooms, maneuvering space, and surfaces for use of the manual wheelchair that is provided. (X) D.  Use of the wheelchair will significantly improve the beneficiary's ability to participates in MRADL's and beneficiary will use it on a regular

## 2020-12-04 NOTE — PROGRESS NOTES
Hematology/Oncology follow up note      She reports feeling weak  Son had exposure to covid   Phone visit conducted as patient on isolation for preservation of ppe    HPI  Patient is a a 63 y.o female well known to me for the management of multiple myeloma Subcutaneous Nightly   • Insulin Aspart Pen  1-11 Units Subcutaneous TID CC   • Rosuvastatin Calcium  5 mg Oral Nightly   • Heparin Sodium (Porcine)  5,000 Units Subcutaneous Q8H [address]   • Miconazole Nitrate   Topical [email or web address]       ALLERGIES:   Iodine to restart velcade on Monday in office     Pathologic Fracture  - ORIF at [facility]- DR. Alea Mack, will need follow up     Anemia  - secondary to MM, renal failure  - will send additional labs to rule out other causes of anemia   - transfuse to keep hgb > 7

## 2020-12-04 NOTE — PLAN OF CARE
Problem: Patient Centered Care  Goal: Patient preferences are identified and integrated in the patient's plan of care  Description: Interventions:  - What would you like us to know as we care for you?  I want to go home w/ my family   - Provide timely, co hypoglycemia  - Administer ordered medications to maintain glucose within target range  - Assess barriers to adequate nutritional intake and initiate nutrition consult as needed  - Instruct patient on self management of diabetes  Outcome: Progressing     P specific gravity, serum osmolarity and serum sodium as indicated or ordered  - Monitor response to interventions for patient's volume status, including labs, urine output, blood pressure (other measures as available)  - Encourage oral intake as appropriate

## 2020-12-04 NOTE — PLAN OF CARE
Problem: Patient Centered Care  Goal: Patient preferences are identified and integrated in the patient's plan of care  Description: Interventions:  - What would you like us to know as we care for you?  I want to go home w/ my family   - Provide timely, co eval  - assist patient   - fall precautions   - See additional Care Plan goals for specific interventions  Outcome: Progressing  Note: Patient was able to sit self up at the side of the bed to eat meals     Problem: SAFETY ADULT - FALL  Goal: Free from fal Hemodynamic stability and optimal renal function maintained  Description: INTERVENTIONS:  - Monitor labs and assess for signs and symptoms of volume excess or deficit  - Monitor intake, output and patient weight  - Monitor urine specific gravity, serum osm

## 2020-12-04 NOTE — PLAN OF CARE
Md called and notified  due to patient possible exposure to positive COVID person. Daughter and son of the patient informed the nurse that the they were exposed to positive COVID individual and just found out today.  The son said that he is not feeling well

## 2020-12-05 LAB
ALBUMIN SERPL-MCNC: 3 G/DL (ref 3.4–5)
ALBUMIN/GLOB SERPL: 0.7 {RATIO} (ref 1–2)
ALP LIVER SERPL-CCNC: 110 U/L
ALT SERPL-CCNC: 12 U/L
ANION GAP SERPL CALC-SCNC: 7 MMOL/L (ref 0–18)
AST SERPL-CCNC: 10 U/L (ref 15–37)
BASOPHILS # BLD AUTO: 0.18 X10(3) UL (ref 0–0.2)
BASOPHILS NFR BLD AUTO: 2.1 %
BILIRUB SERPL-MCNC: 0.7 MG/DL (ref 0.1–2)
BUN BLD-MCNC: 11 MG/DL (ref 7–18)
BUN/CREAT SERPL: 3.8 (ref 10–20)
CALCIUM BLD-MCNC: 8 MG/DL (ref 8.5–10.1)
CHLORIDE SERPL-SCNC: 100 MMOL/L (ref 98–112)
CO2 SERPL-SCNC: 32 MMOL/L (ref 21–32)
CREAT BLD-MCNC: 2.92 MG/DL
DEPRECATED RDW RBC AUTO: 54.1 FL (ref 35.1–46.3)
EOSINOPHIL # BLD AUTO: 0.49 X10(3) UL (ref 0–0.7)
EOSINOPHIL NFR BLD AUTO: 5.8 %
ERYTHROCYTE [DISTWIDTH] IN BLOOD BY AUTOMATED COUNT: 16.2 % (ref 11–15)
FOLATE SERPL-MCNC: 3.7 NG/ML (ref 8.7–?)
GLOBULIN PLAS-MCNC: 4.2 G/DL (ref 2.8–4.4)
GLUCOSE BLD-MCNC: 89 MG/DL (ref 70–99)
GLUCOSE BLDC GLUCOMTR-MCNC: 126 MG/DL (ref 70–99)
GLUCOSE BLDC GLUCOMTR-MCNC: 134 MG/DL (ref 70–99)
GLUCOSE BLDC GLUCOMTR-MCNC: 143 MG/DL (ref 70–99)
GLUCOSE BLDC GLUCOMTR-MCNC: 189 MG/DL (ref 70–99)
HAPTOGLOB SERPL-MCNC: 382 MG/DL (ref 30–200)
HCT VFR BLD AUTO: 26.4 %
HGB BLD-MCNC: 8.5 G/DL
IMM GRANULOCYTES # BLD AUTO: 0.21 X10(3) UL (ref 0–1)
IMM GRANULOCYTES NFR BLD: 2.5 %
IRON SATURATION: 28 %
IRON SERPL-MCNC: 90 UG/DL
LDH SERPL L TO P-CCNC: 209 U/L
LYMPHOCYTES # BLD AUTO: 1.01 X10(3) UL (ref 1–4)
LYMPHOCYTES NFR BLD AUTO: 12 %
M PROTEIN MFR SERPL ELPH: 7.2 G/DL (ref 6.4–8.2)
MCH RBC QN AUTO: 31.1 PG (ref 26–34)
MCHC RBC AUTO-ENTMCNC: 32.2 G/DL (ref 31–37)
MCV RBC AUTO: 96.7 FL
MONOCYTES # BLD AUTO: 0.82 X10(3) UL (ref 0.1–1)
MONOCYTES NFR BLD AUTO: 9.8 %
NEUTROPHILS # BLD AUTO: 5.69 X10 (3) UL (ref 1.5–7.7)
NEUTROPHILS # BLD AUTO: 5.69 X10(3) UL (ref 1.5–7.7)
NEUTROPHILS NFR BLD AUTO: 67.8 %
OSMOLALITY SERPL CALC.SUM OF ELEC: 287 MOSM/KG (ref 275–295)
PLATELET # BLD AUTO: 99 10(3)UL (ref 150–450)
POTASSIUM SERPL-SCNC: 2.9 MMOL/L (ref 3.5–5.1)
RBC # BLD AUTO: 2.73 X10(6)UL
SODIUM SERPL-SCNC: 139 MMOL/L (ref 136–145)
TOTAL IRON BINDING CAPACITY: 325 UG/DL (ref 240–450)
TRANSFERRIN SERPL-MCNC: 218 MG/DL (ref 200–360)
VIT B12 SERPL-MCNC: 1747 PG/ML (ref 193–986)
WBC # BLD AUTO: 8.4 X10(3) UL (ref 4–11)

## 2020-12-05 PROCEDURE — 99213 OFFICE O/P EST LOW 20 MIN: CPT | Performed by: INTERNAL MEDICINE

## 2020-12-05 NOTE — PROGRESS NOTES
[facility]    Progress Note    Gracy Obregon Patient Status:  Inpatient    3/7/1955 MRN [id number]   Location [facility] 4W/SW/SE Attending Gavino Iniguez MD   Hosp Day # 2 PCP Lay Good MD     Subjective: Intolerance    Objective:   Vital Signs:  Blood pressure 115/87, pulse 73, temperature 97.7 °F (36.5 °C), temperature source Oral, resp. rate 18, height 5' 7\" (1.702 m), weight (!) 346 lb 3.2 oz (157 kg), SpO2 97 %, not currently breastfeeding.      Consti Pathologic fracture in the right arm on 10/27/2020. On 11/9/2020, plans to treat with Revlimid (lenalidomide) and Velcade (bortezomib) to start date 8–28 on each cycle with adjust regimen to allow week for break.   Dexamethasone 40 mg on Mondays.  -Consult nightly  - C/w home medications     Chronic constipation  - Patient having BMs  –Aggressive bowel regimen     Chronic lymphedema  Home medications: Lasix 80 mg daily  - Hold Lasix for today, can resume tomorrow  –Continue using compression stockings     Eduar between rooms, maneuvering space, and surfaces for use of the manual wheelchair that is provided. (X) D.  Use of the wheelchair will significantly improve the beneficiary's ability to participates in MRADL's and beneficiary will use it on a regular basis

## 2020-12-05 NOTE — PLAN OF CARE
Patient states some discomfort to right arm, but declines pain medication. Right arm remains in cast and sling. Mycostatin powder applied to abdominal redness. Plan for hemodialysis today.   Problem: Patient Centered Care  Goal: Patient preferences are i physical needs  - Identify cognitive and physical deficits and behaviors that affect risk of falls.   - Bates fall precautions as indicated by assessment.  - Educate pt/family on patient safety including physical limitations  - Instruct pt to call for a MUSCULOSKELETAL - ADULT  Goal: Return mobility to safest level of function  Description: INTERVENTIONS:  - Assess patient stability and activity tolerance for standing, transferring and ambulating w/ or w/o assistive devices  - Assist with transfers and am

## 2020-12-05 NOTE — PROGRESS NOTES
[place] [facility]    Progress Note    Earlene Obregon Patient Status:  Inpatient    3/7/1955 MRN [id number]   Location [facility] 4W/SW/SE Attending David Parkinson MD   Hosp Day # 3 PCP Perry Hyde MD       Subjective: 57* 29*   CREATSERUM 9.24* 10.50* 6.88*   GFRAA 5* 4* 7*   GFRNAA 4* 4* 6*   CA 6.2* 5.8* 6.5*   * 136 137   K 4.6 4.2 4.2   CL 99 101 103   CO2 25.0 26.0 26.0          No results found.         Lyn Peter MD  12/5/2020

## 2020-12-05 NOTE — CM/SW NOTE
SW attempted f/up to move pt's d/c process forward. SW contacted pt's dtr, Milla, and son, Otis Bailey (LYLE placed on 3 way call by dtr) to discuss f/up of d/c planning.     Per pt's son, Otis Bailey, his roommate has tested positive for COVID and therefore,

## 2020-12-06 ENCOUNTER — APPOINTMENT (OUTPATIENT)
Dept: GENERAL RADIOLOGY | Facility: HOSPITAL | Age: 65
DRG: 682 | End: 2020-12-06
Attending: INTERNAL MEDICINE
Payer: MEDICARE

## 2020-12-06 ENCOUNTER — APPOINTMENT (OUTPATIENT)
Dept: GENERAL RADIOLOGY | Facility: HOSPITAL | Age: 65
DRG: 640 | End: 2020-12-06
Attending: INTERNAL MEDICINE
Payer: MEDICARE

## 2020-12-06 PROBLEM — E78.49 OTHER HYPERLIPIDEMIA: Status: ACTIVE | Noted: 2017-03-01

## 2020-12-06 PROBLEM — S42.401D: Status: ACTIVE | Noted: 2020-12-06

## 2020-12-06 PROBLEM — F32.A DEPRESSION: Status: ACTIVE | Noted: 2020-12-06

## 2020-12-06 LAB
ALBUMIN SERPL-MCNC: 2.7 G/DL (ref 3.4–5)
ANION GAP SERPL CALC-SCNC: 6 MMOL/L (ref 0–18)
BASOPHILS # BLD AUTO: 0.25 X10(3) UL (ref 0–0.2)
BASOPHILS NFR BLD AUTO: 2.8 %
BUN BLD-MCNC: 23 MG/DL (ref 7–18)
BUN/CREAT SERPL: 3.9 (ref 10–20)
CALCIUM BLD-MCNC: 6.9 MG/DL (ref 8.5–10.1)
CHLORIDE SERPL-SCNC: 101 MMOL/L (ref 98–112)
CO2 SERPL-SCNC: 31 MMOL/L (ref 21–32)
CREAT BLD-MCNC: 5.92 MG/DL
DEPRECATED HBV CORE AB SER IA-ACNC: 660 NG/ML
DEPRECATED RDW RBC AUTO: 55.7 FL (ref 35.1–46.3)
EOSINOPHIL # BLD AUTO: 0.44 X10(3) UL (ref 0–0.7)
EOSINOPHIL NFR BLD AUTO: 5 %
ERYTHROCYTE [DISTWIDTH] IN BLOOD BY AUTOMATED COUNT: 16.9 % (ref 11–15)
GLUCOSE BLD-MCNC: 113 MG/DL (ref 70–99)
GLUCOSE BLDC GLUCOMTR-MCNC: 117 MG/DL (ref 70–99)
GLUCOSE BLDC GLUCOMTR-MCNC: 135 MG/DL (ref 70–99)
GLUCOSE BLDC GLUCOMTR-MCNC: 141 MG/DL (ref 70–99)
GLUCOSE BLDC GLUCOMTR-MCNC: 155 MG/DL (ref 70–99)
HCT VFR BLD AUTO: 25.2 %
HGB BLD-MCNC: 7.9 G/DL
IMM GRANULOCYTES # BLD AUTO: 0.14 X10(3) UL (ref 0–1)
IMM GRANULOCYTES NFR BLD: 1.6 %
IRON SATURATION: 16 %
IRON SERPL-MCNC: 47 UG/DL
LYMPHOCYTES # BLD AUTO: 1.37 X10(3) UL (ref 1–4)
LYMPHOCYTES NFR BLD AUTO: 15.5 %
MCH RBC QN AUTO: 31.2 PG (ref 26–34)
MCHC RBC AUTO-ENTMCNC: 31.3 G/DL (ref 31–37)
MCV RBC AUTO: 99.6 FL
MONOCYTES # BLD AUTO: 1.32 X10(3) UL (ref 0.1–1)
MONOCYTES NFR BLD AUTO: 14.9 %
NEUTROPHILS # BLD AUTO: 5.32 X10 (3) UL (ref 1.5–7.7)
NEUTROPHILS # BLD AUTO: 5.32 X10(3) UL (ref 1.5–7.7)
NEUTROPHILS NFR BLD AUTO: 60.2 %
OSMOLALITY SERPL CALC.SUM OF ELEC: 290 MOSM/KG (ref 275–295)
PHOSPHATE SERPL-MCNC: 3.6 MG/DL (ref 2.5–4.9)
PLATELET # BLD AUTO: 99 10(3)UL (ref 150–450)
POTASSIUM SERPL-SCNC: 4 MMOL/L (ref 3.5–5.1)
RBC # BLD AUTO: 2.53 X10(6)UL
SODIUM SERPL-SCNC: 138 MMOL/L (ref 136–145)
TOTAL IRON BINDING CAPACITY: 289 UG/DL (ref 240–450)
TRANSFERRIN SERPL-MCNC: 194 MG/DL (ref 200–360)
WBC # BLD AUTO: 8.8 X10(3) UL (ref 4–11)

## 2020-12-06 PROCEDURE — 74018 RADEX ABDOMEN 1 VIEW: CPT | Performed by: INTERNAL MEDICINE

## 2020-12-06 PROCEDURE — 99213 OFFICE O/P EST LOW 20 MIN: CPT | Performed by: INTERNAL MEDICINE

## 2020-12-06 RX ORDER — LORAZEPAM 0.5 MG/1
0.5 TABLET ORAL ONCE
Status: DISCONTINUED | OUTPATIENT
Start: 2020-12-06 | End: 2020-12-09

## 2020-12-06 RX ORDER — LORAZEPAM 2 MG/ML
1 INJECTION INTRAMUSCULAR ONCE
Status: COMPLETED | OUTPATIENT
Start: 2020-12-06 | End: 2020-12-06

## 2020-12-06 NOTE — PROGRESS NOTES
[facility]    Progress Note    Julia Obregon Patient Status:  Inpatient    3/7/1955 MRN [id number]   Location [facility] 4W/SW/SE Attending Sinai Polk MD   Hosp Day # 4 PCP Henrry Soria MD     Subjective: changes  Neuro: Weakness, Numbness, Paresthesias, Loss of Consciousness, Syncope, Dizziness, Headache, Falls  Psych:  Anxiety, Depression, Insomnia, Suicidal Ideation, Homicidal ideation, Memory Changes  Heme/Lymph: Bruising, Bleeding, Lymphadenopathy  Endo nephropathy  Follows with  of heme/onc. Diagnosed in 2017 with presentation of renal failure. 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 Received CYBORD with Velcade maintenance therapy.   Skeletal survey from 10/2020 showed new multifactorial from ESRD, multiple myeloma, and active chemotherapy  Home medications: Ferric citrate 210 mg daily  –Daily CBCs     Type 2 diabetes mellitus  Hemoglobin A1c: 8.9% on admission  Home medications: Glargine 52 units in the skin every evening and necessary. Patient is confined to one room and would benefit from a bedside commode given chronic pain as above.      (X) A.  The beneficiary has a mobility limitation that significantly impairs his/her ability to participate in one or more mobility-

## 2020-12-06 NOTE — PLAN OF CARE
Pt is A&Ox4, VSS on room air. Denies pain. Tolerating diet for breakfast, declining to eat lunch and dinner. Suppository and miralax given today per pt request, x1 large bowel movement. This afternoon pt kept stating \"I don't feel good. \" This nurse jada Goal  Description: Patient's Short Term Goal: to gain strength back    Interventions:   - PT/OT eval  - assist patient   - fall precautions   - See additional Care Plan goals for specific interventions  Outcome: Progressing     Problem: SAFETY ADULT - FALL deficit  - Monitor intake, output and patient weight  - Monitor urine specific gravity, serum osmolarity and serum sodium as indicated or ordered  - Monitor response to interventions for patient's volume status, including labs, urine output, blood pressure

## 2020-12-06 NOTE — PROGRESS NOTES
Attempted tx, declined by RN. Pt w/ agitation earlier and received Ativan, currently sleeping per RN. Will re-attempt asap.    Celia Brooke

## 2020-12-06 NOTE — PLAN OF CARE
Patient A&Ox4. R Arm in sling, mentions discomfort, declines pain medication. Vitals stable. Minimal urine output r/t ESRD. Glucose monitoring ACHS. Patient feeling constipated, receiving scheduled colace, PRN miralax offered, pt declined.  Max assist, repo precautions   - See additional Care Plan goals for specific interventions  Outcome: Progressing     Problem: SAFETY ADULT - FALL  Goal: Free from fall injury  Description: INTERVENTIONS:  - Assess pt frequently for physical needs  - Identify cognitive and indicated or ordered  - Monitor response to interventions for patient's volume status, including labs, urine output, blood pressure (other measures as available)  - Encourage oral intake as appropriate  - Instruct patient on fluid and nutrition restriction

## 2020-12-06 NOTE — PLAN OF CARE
Pt is A&Ox4, VSS on room air. Denies pain. Pt received HD today and had 2 L off. Tolerating renal/carb controlled diet, no n/v, ACHS accucheck. Pt able to dangle her legs at the bedside today. R elbow in cast and sling.  Call light within reach, will contin back    Interventions:   - PT/OT eval  - assist patient   - fall precautions   - See additional Care Plan goals for specific interventions  Outcome: Progressing     Problem: SAFETY ADULT - FALL  Goal: Free from fall injury  Description: INTERVENTIONS:  - A urine specific gravity, serum osmolarity and serum sodium as indicated or ordered  - Monitor response to interventions for patient's volume status, including labs, urine output, blood pressure (other measures as available)  - Encourage oral intake as appro

## 2020-12-07 LAB
GLUCOSE BLDC GLUCOMTR-MCNC: 107 MG/DL (ref 70–99)
GLUCOSE BLDC GLUCOMTR-MCNC: 133 MG/DL (ref 70–99)
GLUCOSE BLDC GLUCOMTR-MCNC: 139 MG/DL (ref 70–99)
GLUCOSE BLDC GLUCOMTR-MCNC: 158 MG/DL (ref 70–99)

## 2020-12-07 PROCEDURE — 99214 OFFICE O/P EST MOD 30 MIN: CPT | Performed by: INTERNAL MEDICINE

## 2020-12-07 RX ORDER — ESCITALOPRAM OXALATE 5 MG/1
5 TABLET ORAL DAILY
Status: DISCONTINUED | OUTPATIENT
Start: 2020-12-07 | End: 2020-12-09

## 2020-12-07 RX ORDER — POLYETHYLENE GLYCOL 3350 17 G/17G
17 POWDER, FOR SOLUTION ORAL DAILY
Status: DISCONTINUED | OUTPATIENT
Start: 2020-12-07 | End: 2020-12-09

## 2020-12-07 NOTE — PLAN OF CARE
Veronika Adkins is aware of POC at this time. Patient is upset about her current situation and expressed frustration. HD completed. 2,000 out. [facility] ordered for Wednesday. Patient is a max assist with all cares.  She is requesting to get up tomorrow am. FALL  Goal: Free from fall injury  Description: INTERVENTIONS:  - Assess pt frequently for physical needs  - Identify cognitive and physical deficits and behaviors that affect risk of falls.   - [place] fall precautions as indicated by assessment.  - Educ appropriate  Outcome: Progressing     Problem: SKIN/TISSUE INTEGRITY - ADULT  Goal: Skin integrity remains intact  Description: INTERVENTIONS  - Assess and document risk factors for pressure ulcer development  - Assess and document skin integrity  - [place]

## 2020-12-07 NOTE — PROGRESS NOTES
[facility]    Progress Note    Ana Obregon Patient Status:  Inpatient    3/7/1955 MRN [id number]   Location [facility] 4W/SW/SE Attending Luis Enrique Mendez MD   Hosp Day # 5 PCP Katrina Navas MD     Subjective: dislocation of right elbow with routine healing    Depression    Patient remains medically stable. KUB unremarkable except for large burden of stool. Patient's constipation most likely secondary to her immobility, lack of fluid.   She did have bowel mov

## 2020-12-07 NOTE — SPIRITUAL CARE NOTE
Pt expressed frustration about her recent diagnosis and her experience at rehab was not helpful for her recovery. She could not spend time with her children because they got covid. She felt she did not want to fight any more.  Pt's [de-identified] year old grandson is

## 2020-12-07 NOTE — PLAN OF CARE
Patient A&Ox4. Denies pain. Vitals stable. Minimal urine output r/t ESRD. Glucose monitoring ACHS. Having BMs overnight. Max assist, repositioned as tolerated. Hourly rounding performed, call light in reach, fall precautions in place.  Plan for home with [place] FALL  Goal: Free from fall injury  Description: INTERVENTIONS:  - Assess pt frequently for physical needs  - Identify cognitive and physical deficits and behaviors that affect risk of falls.   - [place] fall precautions as indicated by assessment.  - Educ pressure (other measures as available)  - Encourage oral intake as appropriate  - Instruct patient on fluid and nutrition restrictions as appropriate  Outcome: Progressing     Problem: SKIN/TISSUE INTEGRITY - ADULT  Goal: Skin integrity remains intact  Anand

## 2020-12-08 LAB
GLUCOSE BLDC GLUCOMTR-MCNC: 114 MG/DL (ref 70–99)
GLUCOSE BLDC GLUCOMTR-MCNC: 122 MG/DL (ref 70–99)
GLUCOSE BLDC GLUCOMTR-MCNC: 122 MG/DL (ref 70–99)
GLUCOSE BLDC GLUCOMTR-MCNC: 145 MG/DL (ref 70–99)
SARS-COV-2 RNA RESP QL NAA+PROBE: NOT DETECTED

## 2020-12-08 PROCEDURE — 99213 OFFICE O/P EST LOW 20 MIN: CPT | Performed by: INTERNAL MEDICINE

## 2020-12-08 RX ORDER — LENALIDOMIDE 5 MG/1
5 CAPSULE ORAL EVERY 24 HOURS
Status: DISCONTINUED | OUTPATIENT
Start: 2020-12-08 | End: 2020-12-09

## 2020-12-08 NOTE — PLAN OF CARE
Patient asleep through night. States frustration at discharge plan. Denies need for any pain medication. Refused colace and heparin last evening. Right arm/hand CMS intact.   Problem: Patient Centered Care  Goal: Patient preferences are identified and i Diabetes/Glucose Control  Goal: Glucose maintained within prescribed range  Description: INTERVENTIONS:  - Monitor Blood Glucose as ordered  - Assess for signs and symptoms of hyperglycemia and hypoglycemia  - Administer ordered medications to maintain glu nutrition restrictions as appropriate  Outcome: Progressing  Goal: Hemodynamic stability and optimal renal function maintained  Description: INTERVENTIONS:  - Monitor labs and assess for signs and symptoms of volume excess or deficit  - Monitor intake, out

## 2020-12-08 NOTE — PHYSICAL THERAPY NOTE
Patient in bed and PCT finished cleaning her up from bedpan. RN approved session. Patient strongly refused to participate with therapy and wants to sty in bed. depside encouragement given. RN informed.

## 2020-12-08 NOTE — PLAN OF CARE
Patient alert and oriented x4. Max assist. Carb controlled renal diet. Accuchecks ACHS. Denies pain. Right arm in sling from ORIF. Left arm precautions maintained for AV fistula.  Medication from home (Revlimid) reordered, however, patient supply of medicat back    Interventions:   - PT/OT eval  - assist patient   - fall precautions   - See additional Care Plan goals for specific interventions  Outcome: Progressing     Problem: SAFETY ADULT - FALL  Goal: Free from fall injury  Description: INTERVENTIONS:  - A urine specific gravity, serum osmolarity and serum sodium as indicated or ordered  - Monitor response to interventions for patient's volume status, including labs, urine output, blood pressure (other measures as available)  - Encourage oral intake as appro

## 2020-12-08 NOTE — PROGRESS NOTES
Hematology/Oncology follow up note      Chart reviewed   No new events, she is now willing to go to rehab as both her children have covid     Past Medical History:   Diagnosis Date   • Allergic rhinitis May 78   • Anxiety state    • Arthritis June86   • Ca Oral Nightly   • Heparin Sodium (Porcine)  5,000 Units Subcutaneous Q8H [facility]   • Miconazole Nitrate   Topical [email or web address]       ALLERGIES:   Iodine (Topical)        HIVES  Radiology Contrast *    HIVES  Sulfa Antibiotics       RASH  Tetanus Toxoid of anemia   - transfuse to keep hgb > 7     Dispo: home with home health pending progress   Will follow along   Thank you for allowing me to participate in the care of this patient, please call with any questions.     Javi Canas M.D.  Dodie Burkitt

## 2020-12-08 NOTE — PROGRESS NOTES
[facility]    Progress Note    Benton Valerio Obregon Patient Status:  Inpatient    3/7/1955 MRN [id number]   Location [facility] 4W/SW/SE Attending John Vaca MD   Hosp Day # 6 PCP Angel Barcenas MD     Subjective: with routine healing    Depression    Remains medically stable. Patient received dialysis yesterday and is on a Monday/Wednesday/Friday schedule.     Treatment of multiple myeloma per oncologist--currently on hold and will be resumed as outpatient, thoug

## 2020-12-09 ENCOUNTER — TELEPHONE (OUTPATIENT)
Dept: INTERNAL MEDICINE CLINIC | Facility: CLINIC | Age: 65
End: 2020-12-09

## 2020-12-09 ENCOUNTER — HOSPITAL ENCOUNTER (OUTPATIENT)
Facility: HOSPITAL | Age: 65
Setting detail: OBSERVATION
Discharge: NURSING, REHAB OR LONG-TERM CARE | End: 2020-12-11
Attending: EMERGENCY MEDICINE | Admitting: INTERNAL MEDICINE
Payer: MEDICARE

## 2020-12-09 VITALS
TEMPERATURE: 98 F | WEIGHT: 293 LBS | SYSTOLIC BLOOD PRESSURE: 108 MMHG | OXYGEN SATURATION: 94 % | DIASTOLIC BLOOD PRESSURE: 36 MMHG | HEART RATE: 76 BPM | BODY MASS INDEX: 45.99 KG/M2 | RESPIRATION RATE: 18 BRPM | HEIGHT: 67 IN

## 2020-12-09 DIAGNOSIS — Z02.2 ENCOUNTER FOR EXAMINATION FOR ADMISSION TO NURSING HOME: Primary | ICD-10-CM

## 2020-12-09 LAB
GLUCOSE BLDC GLUCOMTR-MCNC: 170 MG/DL (ref 70–99)
GLUCOSE BLDC GLUCOMTR-MCNC: 83 MG/DL (ref 70–99)

## 2020-12-09 PROCEDURE — 99217 OBSERVATION CARE DISCHARGE: CPT | Performed by: INTERNAL MEDICINE

## 2020-12-09 RX ORDER — HYDROCODONE BITARTRATE AND ACETAMINOPHEN 10; 325 MG/1; MG/1
1 TABLET ORAL EVERY 4 HOURS PRN
Qty: 30 TABLET | Refills: 0 | Status: SHIPPED | OUTPATIENT
Start: 2020-12-09 | End: 2020-12-09

## 2020-12-09 RX ORDER — ACYCLOVIR 200 MG/1
200 CAPSULE ORAL 2 TIMES DAILY
Status: DISCONTINUED | OUTPATIENT
Start: 2020-12-10 | End: 2020-12-11

## 2020-12-09 RX ORDER — HYDROCODONE BITARTRATE AND ACETAMINOPHEN 10; 325 MG/1; MG/1
1 TABLET ORAL EVERY 4 HOURS PRN
Qty: 30 TABLET | Refills: 0 | Status: ON HOLD | OUTPATIENT
Start: 2020-12-09 | End: 2020-12-11

## 2020-12-09 RX ORDER — BISACODYL 10 MG
10 SUPPOSITORY, RECTAL RECTAL
Status: DISCONTINUED | OUTPATIENT
Start: 2020-12-09 | End: 2020-12-11

## 2020-12-09 RX ORDER — MONTELUKAST SODIUM 10 MG/1
10 TABLET ORAL NIGHTLY
Status: DISCONTINUED | OUTPATIENT
Start: 2020-12-09 | End: 2020-12-11

## 2020-12-09 RX ORDER — BISACODYL 10 MG
10 SUPPOSITORY, RECTAL RECTAL
Refills: 0 | Status: SHIPPED | COMMUNITY
Start: 2020-12-09 | End: 2021-01-01

## 2020-12-09 RX ORDER — POLYETHYLENE GLYCOL 3350 17 G/17G
17 POWDER, FOR SOLUTION ORAL DAILY PRN
Status: DISCONTINUED | OUTPATIENT
Start: 2020-12-09 | End: 2020-12-11

## 2020-12-09 RX ORDER — DOCUSATE SODIUM 100 MG/1
100 CAPSULE, LIQUID FILLED ORAL 2 TIMES DAILY
Status: DISCONTINUED | OUTPATIENT
Start: 2020-12-10 | End: 2020-12-11

## 2020-12-09 RX ORDER — DULOXETIN HYDROCHLORIDE 30 MG/1
30 CAPSULE, DELAYED RELEASE ORAL DAILY
Status: DISCONTINUED | OUTPATIENT
Start: 2020-12-10 | End: 2020-12-11

## 2020-12-09 RX ORDER — HYDROCODONE BITARTRATE AND ACETAMINOPHEN 10; 325 MG/1; MG/1
1 TABLET ORAL EVERY 4 HOURS PRN
Status: DISCONTINUED | OUTPATIENT
Start: 2020-12-09 | End: 2020-12-11

## 2020-12-09 RX ORDER — PSEUDOEPHEDRINE HCL 30 MG
100 TABLET ORAL 2 TIMES DAILY
Refills: 0 | Status: SHIPPED | COMMUNITY
Start: 2020-12-09 | End: 2021-01-01

## 2020-12-09 RX ORDER — LENALIDOMIDE 5 MG/1
CAPSULE ORAL
Qty: 21 CAPSULE | Refills: 0 | Status: SHIPPED | OUTPATIENT
Start: 2020-12-09 | End: 2021-01-08

## 2020-12-09 RX ORDER — ROSUVASTATIN CALCIUM 5 MG/1
5 TABLET, COATED ORAL NIGHTLY
Status: DISCONTINUED | OUTPATIENT
Start: 2020-12-09 | End: 2020-12-11

## 2020-12-09 RX ORDER — POLYETHYLENE GLYCOL 3350 17 G/17G
17 POWDER, FOR SOLUTION ORAL DAILY PRN
Refills: 0 | Status: SHIPPED | COMMUNITY
Start: 2020-12-09

## 2020-12-09 NOTE — TELEPHONE ENCOUNTER
S/w Beatris, pharmacist at [facility] in [place].  She was able to take verbal of dx: S42.401D closed fracture dislocation of R elbow with routine healing    She states [place] today, [place] for all opiods

## 2020-12-09 NOTE — PROGRESS NOTES
[facility]    Progress Note    Ana Obregon Patient Status:  Inpatient    3/7/1955 MRN [id number]   Location [facility] 4W/SW/SE Attending Marium Gilliam MD   Hosp Day # 7 PCP Katrina Navas MD       Subjective: 100 101   CO2 26.0 32.0 31.0          No results found.         Umang Kent MD  12/9/2020

## 2020-12-09 NOTE — SPIRITUAL CARE NOTE
Pt is not happy about having to leave hospital today, shd said she has to go where she has to go. She does not see muchhope in her situation, not very interested in talking.  provided words of encouragement.   Katalina Hoang, Chaplain resident, ext [phone]

## 2020-12-09 NOTE — PROGRESS NOTES
[facility]    Progress Note    Ana Obregon Patient Status:  Inpatient    3/7/1955 MRN [id number]   Location [facility] 4W/SW/SE Attending Marium Gilliam MD   Hosp Day # 7 PCP Katrina Navas MD     Subjective: Lucina. She will be discharged there later today after completing dialysis today. Patient will need to continue with physical therapy/Occupational Therapy. Accu-Cheks have remained under reasonable control with current insulin regimen.     Blood press

## 2020-12-09 NOTE — PLAN OF CARE
No acute changes overnight. Right arm in soft cast/sling. CMS intact. No complaints of pain. Repositioned as tolerated by patient. Plan for dialysis today and possible d/c to rehab afterwards.      Problem: Patient Centered Care  Goal: Patient preferences a for physical needs  - Identify cognitive and physical deficits and behaviors that affect risk of falls.   - Floyd fall precautions as indicated by assessment.  - Educate pt/family on patient safety including physical limitations  - Instruct pt to call f patient on fluid and nutrition restrictions as appropriate  Outcome: Progressing     Problem: SKIN/TISSUE INTEGRITY - ADULT  Goal: Skin integrity remains intact  Description: INTERVENTIONS  - Assess and document risk factors for pressure ulcer development

## 2020-12-09 NOTE — PLAN OF CARE
Patient alert and oriented x4. Max assist. Carb controlled renal diet. Accuchecks ACHS. Denies pain. Right arm in sling from ORIF. Left arm precautions maintained for AV fistula. Dialysis completed this AM, 2 L removed.   Plan for [facility] to [facility] Care Plan goals for specific interventions  Outcome: Progressing     Problem: SAFETY ADULT - FALL  Goal: Free from fall injury  Description: INTERVENTIONS:  - Assess pt frequently for physical needs  - Identify cognitive and physical deficits and behaviors response to interventions for patient's volume status, including labs, urine output, blood pressure (other measures as available)  - Encourage oral intake as appropriate  - Instruct patient on fluid and nutrition restrictions as appropriate  Outcome: Progr

## 2020-12-09 NOTE — ED INITIAL ASSESSMENT (HPI)
Pt discharged from [facility] inpatient today via [facility] to [facility] in [place]. When superior arrived they only had one room available which would have given the patient 2 roommates and no bariatric bed.   Pt returned to [place]

## 2020-12-09 NOTE — TELEPHONE ENCOUNTER
[facility] [place] requesting RX which includes diagnosis code for Hydrocodone that was escribed earlier  Send again with diagnosis code  Tasked to nursing

## 2020-12-10 PROCEDURE — 99213 OFFICE O/P EST LOW 20 MIN: CPT | Performed by: INTERNAL MEDICINE

## 2020-12-10 RX ORDER — HEPARIN SODIUM 5000 [USP'U]/ML
5000 INJECTION, SOLUTION INTRAVENOUS; SUBCUTANEOUS EVERY 8 HOURS
Status: DISCONTINUED | OUTPATIENT
Start: 2020-12-10 | End: 2020-12-11

## 2020-12-10 NOTE — CM/SW NOTE
Referral sent in [facility] for a facility with on-site dialysis and bariatric capabilities. CM/SW to remain available for discharge planning/support.     Tasneem Michelle  [phone]

## 2020-12-10 NOTE — ED NOTES
Report received from JANI, [facility]. Pt resting in bed, respirating well on room air. No acute distress noted. Will continue to monitor.

## 2020-12-10 NOTE — PLAN OF CARE
Problem: Patient Centered Care  Goal: Patient preferences are identified and integrated in the patient's plan of care  Description: Interventions:  - What would you like us to know as we care for you?  I was dc'd today and the SNF didn't have my bariatric symptoms of volume excess or deficit  - Monitor intake, output and patient weight  - Monitor urine specific gravity, serum osmolarity and serum sodium as indicated or ordered  - Monitor response to interventions for patient's volume status, including labs, position while performing ADLs such as feeding, grooming, and bathing  - Educate and encourage patient/family in tolerated functional activity level and precautions during self-care  {Additional ADL Interventions:[id number]:::0}  Outcome: Not Progressing

## 2020-12-10 NOTE — ED NOTES
Orders for admission, patient is aware of plan and ready to go upstairs. Any questions, please call ED JOEY Burr  at extension [phone].    Type of COVID test sent: rapid  COVID Suspicion level: low    Titratable drug(s) infusing: none  Rate:     LOC at time of

## 2020-12-10 NOTE — CM/SW NOTE
Patient has been accepted at [facility] for rehab. They are aware the patient requires a bariatric bed and know the patient's preference for a private room.  Spoke with Esau Sorenson from [facility] and she stated that they do isolate people for 14 days in a

## 2020-12-10 NOTE — PLAN OF CARE
Problem: Patient Centered Care  Goal: Patient preferences are identified and integrated in the patient's plan of care  Description: Interventions:  - What would you like us to know as we care for you?  I was dc'd today and the SNF didn't have my bariatric MWF  - IV antibiotics    - See additional Care Plan goals for specific interventions  12/10/2020 0341 by Thurmond Cockayne, RN  Outcome: Progressing  12/10/2020 0236 by Thurmond Cockayne, RN  Outcome: Progressing     Problem: METABOLIC/FLUID AND ELECTROLYTES - CATE patient/family  12/10/2020 0341 by Jim Walls RN  Outcome: Not Progressing  12/10/2020 0236 by Jim Walls RN  Outcome: Not Progressing  Goal: Maintain proper alignment of affected body part  Description: INTERVENTIONS:  - Support and protect limb a appropriate  12/10/2020 0341 by Shan Farmer RN  Outcome: Progressing  12/10/2020 0236 by Shan Farmer RN  Outcome: Progressing     Problem: SAFETY ADULT - FALL  Goal: Free from fall injury  Description: INTERVENTIONS:  - Assess pt frequently for physi

## 2020-12-10 NOTE — DISCHARGE SUMMARY
[place] [facility]    Discharge Summary    Gokul Obregon Patient Status:  Inpatient    3/7/1955 MRN [id number]   Location [facility] 4W/SW/SE Attending No att. providers found   Hosp Day # 7 PCP Lin Bahena MD     Date o fracture requiring open reduction internal fixation. However patient was unhappy and signed out AMA from rehab and went home. After 1 day, family felt they were unable to properly care for patient and brought her to [place] emergency room.   Patient was Inject 52 Units into the skin nightly. Quantity:    Refills: 0     Miconazole Nitrate 2 % Powd  Notes to patient: FOR RASH/REDNESS IN GROIN/ABDOMEN FOLDS      Apply topically.    Quantity:    Refills: 0     PEG 3350 17 g Pack  Commonly known as: Diana Garcia drug: Ferric Citrate        Basaglar KwikPen 100 UNIT/ML Sopn  Generic drug: insulin glargine        Blood Glucose Test Strp        dexamethasone 4 MG tablet  Commonly known as: DECADRON        Dexilant 60 MG Cpdr  Generic drug: Dexlansoprazole        Dicy

## 2020-12-10 NOTE — CM/SW NOTE
Spoke with patient regarding her recent return to the ER post discharge today from the floor, requesting that she be placed in another facility and insisting that is is [facility] job to do this.        [facility] informed her that normally patients who return for no

## 2020-12-10 NOTE — CM/SW NOTE
Called patients randy Loyola at [phone] and informed her that patient will be going to Room 567. She requested that we infform the SW and CM on the floor her mother is going to. She was very thankful and appreciative for everything.

## 2020-12-10 NOTE — H&P
[facility]    History and Physical    Julia Alex Obregon Patient Status:  Observation    3/7/1955 MRN [id number]   Location [facility] 5SW/SE Attending Oracio Garcia MD   Hosp Day # 0 PCP Henrry Soria MD     Date:  1 A   • OTHER      C section x 2   • OTHER      ovarian cyst   • OTHER SURGICAL HISTORY      ? RSO for cyst   • OTHER SURGICAL HISTORY      lymphedema surgery   • TONSILLECTOMY     • TUBAL LIGATION  11/89     Family History   Problem Relation Age of Onset   • (REVLIMID) 5 MG Oral Cap, Take 5 mg by mouth on days 1-21 of a 28 day cycle. PA # [id number]    •  acyclovir 200 MG Oral Cap, Take 1 capsule (200 mg total) by mouth 2 (two) times daily.     •  Rosuvastatin Calcium 5 MG Oral Tab, Take 1 tablet (5 mg total) by Abdominal: Soft. She exhibits no distension and no mass. There is no abdominal tenderness. Musculoskeletal:      Comments: Right upper extremity sling in place   Neurological: She is alert and oriented to person, place, and time.  No cranial nerve deficit oncologist  Patient has completed course of Revlimid. She will be restarting Velcade as outpatient through infusion center arranged by oncologist.    Patient has pathologic fracture of right elbow and is status post open reduction internal fixation.   This

## 2020-12-10 NOTE — PROGRESS NOTES
[facility] Pharmacy Note:  Anticoagulation Weight Dose Adjustment for heparin    Royal Smith is a 63year old patient who has been prescribed heparin 5000 units every 12 hours.       Estimated Creatinine Clearance: 13 mL/min (A) (based on SCr of 4.3 m

## 2020-12-10 NOTE — PLAN OF CARE
Discussed w/pt that CM is working on placement for transfer that can accomodate HD and hopefully a private room and bariatric bed. Discussed importance of getting OOB but she refused presently.  Also discussed importance of keeping sling on R arm and suppor and/or skin breakdown  - Initiate interventions, skin care algorithm/standards of care as needed  Outcome: Progressing     Problem: MUSCULOSKELETAL - ADULT  Goal: Maintain proper alignment of affected body part  Description: INTERVENTIONS:  - Support and p range  Description: INTERVENTIONS:  - Monitor Blood Glucose as ordered  - Assess for signs and symptoms of hyperglycemia and hypoglycemia  - Administer ordered medications to maintain glucose within target range  - Assess barriers to adequate nutritional i encourage patient/family in tolerated functional activity level and precautions during self-care    Outcome: Not Progressing

## 2020-12-10 NOTE — CM/SW NOTE
Spoke with patient regarding her recent discharge today 12/9/20 to [facility] and her reason for coming right back to the ER.     Patient stated she was going to call the newspapers and tell them how terrible [facility] are that they would discharge her to a

## 2020-12-10 NOTE — ED PROVIDER NOTES
Patient Seen in: [facility] AND [facility] Emergency Department      History   Patient presents with:   Other    Stated Complaint: wrong bed at snf    HPI  63 yoF with ESRD on HD MWF last dialysis today morning, morbid obesity, multiple myeloma, hypocalcemia, ane Neck:      Musculoskeletal: Normal range of motion. Cardiovascular:      Rate and Rhythm: Normal rate and regular rhythm. Heart sounds: Normal heart sounds.       Comments: LUE HD access with palpable thrill  Pulmonary:      Effort: Pulmonary effor potassium. Patient is refusing to go back to her previous SNF. I spoke with Maxime Graham, our , patient will not be able to get alternate placement tonight.   There is no ER  available overnight and next  starts at

## 2020-12-11 VITALS
HEIGHT: 67 IN | TEMPERATURE: 98 F | RESPIRATION RATE: 18 BRPM | SYSTOLIC BLOOD PRESSURE: 129 MMHG | OXYGEN SATURATION: 96 % | DIASTOLIC BLOOD PRESSURE: 51 MMHG | BODY MASS INDEX: 45.99 KG/M2 | WEIGHT: 293 LBS | HEART RATE: 77 BPM

## 2020-12-11 PROCEDURE — 99213 OFFICE O/P EST LOW 20 MIN: CPT | Performed by: INTERNAL MEDICINE

## 2020-12-11 RX ORDER — DICYCLOMINE HYDROCHLORIDE 10 MG/1
10 CAPSULE ORAL 3 TIMES DAILY PRN
Refills: 0 | Status: SHIPPED | COMMUNITY
Start: 2020-12-11 | End: 2021-08-23

## 2020-12-11 RX ORDER — DICYCLOMINE HYDROCHLORIDE 10 MG/1
10 CAPSULE ORAL 3 TIMES DAILY PRN
Status: DISCONTINUED | OUTPATIENT
Start: 2020-12-11 | End: 2020-12-11

## 2020-12-11 RX ORDER — DICYCLOMINE HYDROCHLORIDE 10 MG/1
10 CAPSULE ORAL ONCE
Status: COMPLETED | OUTPATIENT
Start: 2020-12-11 | End: 2020-12-11

## 2020-12-11 NOTE — OCCUPATIONAL THERAPY NOTE
OT orders received and chart reviewed. Pt currently receiving dialysis and per chart is to discharge to rehab later today.  Will follow up if plan changes and formal OT eval is indicated

## 2020-12-11 NOTE — PLAN OF CARE
Pt a/o x 4. Room air. Generalized edema; lymphedema BLE. Anuric (hemodialysis) + BM 12/10  Renal diet tolerated, Accu-checks ACHS. Right arm sling with lower arm soft cast/ace wrap. Left arm fistula - thrill felt bruit heard.    Will continue to [facility] hemodialysis MWF  - IV antibiotics    - See additional Care Plan goals for specific interventions  Outcome: Progressing     Problem: METABOLIC/FLUID AND ELECTROLYTES - ADULT  Goal: Hemodynamic stability and optimal renal function maintained  Description: I spinal or hip dislocation precautions)  Outcome: Progressing     Problem: Impaired Activities of Daily Living  Goal: Achieve highest/safest level of independence in self care  Description: Interventions:  - Assess ability and encourage patient to participa toileting schedule  Outcome: Progressing

## 2020-12-11 NOTE — CONSULTS
[facility]    Report of Consultation    Earlene Obregon Patient Status:  Observation    3/7/1955 MRN [id number]   Location [facility] 5SW/SE Attending Deisy Vickers MD   Hosp Day # 0 PCP Perry Hyde MD     Date o Social History  Social History    Tobacco Use      Smoking status: Never Smoker      Smokeless tobacco: Never Used    Alcohol use: No      Alcohol/week: 0.0 standard drinks    Drug use: No      Current Medications:    •  Dicyclomine HCl (BENTYL) cap suppository (10 mg total) rectally daily as needed. •  Insulin Aspart Pen 100 UNIT/ML Subcutaneous Solution Pen-injector, Inject 1-11 Units into the skin 3 (three) times daily with meals.     •  insulin detemir 100 UNIT/ML Subcutaneous Solution Pen-injec regular rate and rhythm, no edema  Abd: Abdomen soft, nontender, nondistended, no organomegaly, bowel sounds present  Skin: no symptoms reported  Psych: alert and oriented   LUE AVF accessed        Results:     Laboratory Data:  Recent Labs   Lab 12/05/20

## 2020-12-11 NOTE — PROGRESS NOTES
[facility]    Progress Note    Burtonshell Obregon Patient Status:  Observation    3/7/1955 MRN [id number]   Location [facility] 5SW/SE Attending Mansoor Leary MD   Hosp Day # 0 PCP Elkin Prieto MD     Subjective: sugars under stable control with current Levemir and NovoLog sliding scale. Continue Cymbalta for management of depression. Bed is available for patient today at [facility].   We will plan on transfer there after completing dialysis today    Medic

## 2020-12-11 NOTE — CM/SW NOTE
CM notified by liaison Reilly Bello at [facility] that they have HD auth, bariatric bed and a private room ready for pt today. Cm notified Nurys Myles RN and Staci Godwin of above.     Plan  [facility] amb, pcs done, obesity noted to crew  RN re

## 2020-12-11 NOTE — PLAN OF CARE
Arrangements made to transfer to [facility]. Patient and dtr Milla in agreement with this plan. Report called to Sylvie at [facility]. Plan for ambulance to transfer. Patient in stable condition and no c/o voiced. Refusing to have dinner before leaving. hemodialysis MWF  - IV antibiotics    - See additional Care Plan goals for specific interventions  Outcome: Completed     Problem: METABOLIC/FLUID AND ELECTROLYTES - ADULT  Goal: Hemodynamic stability and optimal renal function maintained  Description: INT hip dislocation precautions)  Outcome: Completed     Problem: Impaired Activities of Daily Living  Goal: Achieve highest/safest level of independence in self care  Description: Interventions:  - Assess ability and encourage patient to participate in ADLs t

## 2020-12-15 NOTE — DISCHARGE SUMMARY
[place] [facility] - [facility]    Discharge Summary    Jonelleelkin Shalini Obregon Patient Status:  Observation    3/7/1955 MRN [id number]   Location [facility] 5SW/SE Attending No att. providers found   Hosp Day # 0 PCP Zaira Crain MD     Date o private room or bariatric bed--therefore paramedics brought her back to ER.   Patient was seen by social service and arrangements were made for alternative rehab facility and patient was able to be discharged there the following day--to [facility] each  Refills: 3     Miconazole Nitrate 2 % Powd      Apply topically. Quantity:    Refills: 0     Montelukast Sodium 10 MG Tabs  Commonly known as: SINGULAIR      Take 10 mg by mouth nightly.    Refills: 0     PEG 3350 17 g Pack  Commonly known as: ASUTEN

## 2020-12-18 ENCOUNTER — TELEPHONE (OUTPATIENT)
Dept: INTERNAL MEDICINE CLINIC | Facility: CLINIC | Age: 65
End: 2020-12-18

## 2020-12-18 NOTE — TELEPHONE ENCOUNTER
Pt daughter called to speak to Dr Neha Mao, wanted to provide update about her mother  Pt was in the hospital last week and then she went to nursing home, seemed to be doing better and so pt was going to transition to [facility].   When they got pt home pt wa

## 2020-12-18 NOTE — TELEPHONE ENCOUNTER
SENA to Dr. Calli Haro    Triage RN to please f/u with pt/pt's daughter on Monday for condition update

## 2020-12-21 NOTE — TELEPHONE ENCOUNTER
Noted. Pt refused to stay at skilled nursing facility and left prematurely. Therefore, I am not surprised that pt required readmission.   [address] Eris Benoit will need to work on skilled nursing facility again

## 2020-12-22 NOTE — TELEPHONE ENCOUNTER
Spoke to patient's daughter, Renetta Salvador, who reports patient was admitted to [facility] and is now home. She reports when patient left rehab and was coming home she passed out while trying to get into her house and that is why they took her back to the ER.  Milla

## 2020-12-24 ENCOUNTER — VIRTUAL PHONE E/M (OUTPATIENT)
Dept: INTERNAL MEDICINE CLINIC | Facility: CLINIC | Age: 65
End: 2020-12-24
Payer: MEDICARE

## 2020-12-24 DIAGNOSIS — F33.9 EPISODE OF RECURRENT MAJOR DEPRESSIVE DISORDER, UNSPECIFIED DEPRESSION EPISODE SEVERITY (HCC): ICD-10-CM

## 2020-12-24 DIAGNOSIS — C90.01 MULTIPLE MYELOMA IN REMISSION (HCC): Primary | ICD-10-CM

## 2020-12-24 DIAGNOSIS — S42.401D CLOSED FRACTURE DISLOCATION OF RIGHT ELBOW WITH ROUTINE HEALING: ICD-10-CM

## 2020-12-24 DIAGNOSIS — E11.69 DIABETES MELLITUS TYPE 2 IN OBESE (HCC): ICD-10-CM

## 2020-12-24 DIAGNOSIS — E66.9 DIABETES MELLITUS TYPE 2 IN OBESE (HCC): ICD-10-CM

## 2020-12-24 DIAGNOSIS — I10 ESSENTIAL HYPERTENSION: ICD-10-CM

## 2020-12-24 DIAGNOSIS — N18.6 ESRD (END STAGE RENAL DISEASE) (HCC): ICD-10-CM

## 2020-12-24 PROCEDURE — 99443 PHONE E/M BY PHYS 21-30 MIN: CPT | Performed by: INTERNAL MEDICINE

## 2020-12-24 NOTE — PROGRESS NOTES
Virtual Telephone Check-In    Quinn  verbally consents to a Virtual/Telephone Check-In visit on 12/24/20. Patient has been referred to the [facility] website at [email or web address] to review the yearly Consent to Treat document.     Carline for multiple myeloma. Patient feels that she wants to give herself a break.   I explained that her disease is currently active and will continue to progress with lack of treatment and that patient needs to see oncologist to resume outpatient Velcade infusi

## 2021-01-12 ENCOUNTER — TELEPHONE (OUTPATIENT)
Dept: INTERNAL MEDICINE CLINIC | Facility: CLINIC | Age: 66
End: 2021-01-12

## 2021-01-12 NOTE — TELEPHONE ENCOUNTER
Concerned about low blood sugar readings   Reading today 62    Since Thursday of last week started injections for her cancer - pt states she is swollen; cannot get her shoes on  Sugars usually run high - 400 - when she starts cancer treatments    How shoul

## 2021-01-12 NOTE — TELEPHONE ENCOUNTER
Attempted to call pt in order to relay MD message, unable to reach pt, VM box full.   EMA staff to please re-attempt calling pt

## 2021-01-12 NOTE — TELEPHONE ENCOUNTER
To Dr. Grover Pierre - see pt message below. BS yesterday 87, today 62. Was taking 52units insulin but has decreased to 42units for the last 2-3 days. Legs/arms swollen - had Velcade last Thursday - pt has to take 5 steroid pills the mornng of injection.    Aman Saucedo

## 2021-01-15 ENCOUNTER — TELEPHONE (OUTPATIENT)
Dept: INTERNAL MEDICINE CLINIC | Facility: CLINIC | Age: 66
End: 2021-01-15

## 2021-01-15 NOTE — TELEPHONE ENCOUNTER
Received a fax from [facility] for Dr Nonie Gaucher to fill out a face to face on the patient. Form placed in Dr Fabiola Morales with barcode.

## 2021-01-19 ENCOUNTER — TELEPHONE (OUTPATIENT)
Dept: INTERNAL MEDICINE CLINIC | Facility: CLINIC | Age: 66
End: 2021-01-19

## 2021-01-19 DIAGNOSIS — C90.00 MULTIPLE MYELOMA, REMISSION STATUS UNSPECIFIED (HCC): Primary | ICD-10-CM

## 2021-01-19 NOTE — TELEPHONE ENCOUNTER
Noted.  Will have to wait until document is viewable in the media tab if daughter has any further questions.

## 2021-01-19 NOTE — TELEPHONE ENCOUNTER
Christina Claire is calling to get her mom a wheelchair they are currently renting one [address] ph.  # [phone]   Routed to clinical

## 2021-01-19 NOTE — TELEPHONE ENCOUNTER
Telly Almeida is calling because her mom's H.H. nurse Padmini Pappas needs Dr. Calli Haro to complete paperwork for medicaid that is due on 1/21   Yolanda's ph.  # [phone]  Routed to clinical

## 2021-01-19 NOTE — TELEPHONE ENCOUNTER
To Dr. Sharon Koroma - see below. Will you write order for Wheelchair for pt so Medicare/Ins will cover?

## 2021-01-28 NOTE — TELEPHONE ENCOUNTER
Dr Cesar Friend, patient requesting order for wheelchair. DME order pended with diagnosis needed. Patient to call back [facility] office with information to send order to.

## 2021-01-28 NOTE — TELEPHONE ENCOUNTER
Awaiting call back from Raiza Zamora on where to send DME order for wheelchair, does not want to use [facility].

## 2021-02-01 NOTE — TELEPHONE ENCOUNTER
Spoke to patient Marlon Dutton stated that her daughter will be calling the office and to give her one more day so that she can let us know where she would like us to send the order. Order printed and signed by MD in MD outbox.  Nursing please follow up with Patien

## 2021-02-02 ENCOUNTER — TELEPHONE (OUTPATIENT)
Dept: INTERNAL MEDICINE CLINIC | Facility: CLINIC | Age: 66
End: 2021-02-02

## 2021-02-02 NOTE — TELEPHONE ENCOUNTER
[facility] orders reviewed and signed by Dr. Pearl Pina. Faxed back to [phone]. Await fax confirmation.

## 2021-02-08 ENCOUNTER — TELEPHONE (OUTPATIENT)
Dept: INTERNAL MEDICINE CLINIC | Facility: CLINIC | Age: 66
End: 2021-02-08

## 2021-02-08 NOTE — TELEPHONE ENCOUNTER
Patient is calling to request a refill on her Miconazole Nitrate 2 % External Powder. Patient states she has taken the powder and the cream version and she prefers the cream. Patient would like the cream version of the medication sent to [facility] in [place].

## 2021-02-09 NOTE — TELEPHONE ENCOUNTER
Spoke to Lacy/pt's daughter - DME facility (unable to remember name) stated pt basically owns the wheelchair already. Daughter states nothing more is needed from us at this time.

## 2021-02-22 ENCOUNTER — TELEPHONE (OUTPATIENT)
Dept: INTERNAL MEDICINE CLINIC | Facility: CLINIC | Age: 66
End: 2021-02-22

## 2021-02-22 NOTE — TELEPHONE ENCOUNTER
Kalpana from [facility] is calling to get orders to have PT extended ph.  #  [phone]   Routed to clinical

## 2021-02-22 NOTE — TELEPHONE ENCOUNTER
Dr. Savage Calvert completed face to face form from [facility]. Form along with recent telehealth visit from 12/24/20 & 9/16/20 OV faxed to [phone]. Copy in weekly hold bin.

## 2021-02-23 NOTE — TELEPHONE ENCOUNTER
\"Plan of care and physician order\" as well as \"PT orders\" faxed to [facility] at [phone]. Fax confirmation received, sent to scan.

## 2021-02-23 NOTE — TELEPHONE ENCOUNTER
S/w Kalpana, Intake Rep. Per Kalpana, patient is requesting an extension on PT. A written order is needed. Order written on script pad approving PT extension (per protocol). Order stamped with MDs sign and faxed to [phone].     Kalpana inquired about the plan o

## 2021-03-08 ENCOUNTER — OFFICE VISIT (OUTPATIENT)
Dept: INTERNAL MEDICINE CLINIC | Facility: CLINIC | Age: 66
End: 2021-03-08
Payer: MEDICARE

## 2021-03-08 ENCOUNTER — TELEPHONE (OUTPATIENT)
Dept: INTERNAL MEDICINE CLINIC | Facility: CLINIC | Age: 66
End: 2021-03-08

## 2021-03-08 VITALS
HEIGHT: 67 IN | DIASTOLIC BLOOD PRESSURE: 60 MMHG | WEIGHT: 293 LBS | HEART RATE: 78 BPM | OXYGEN SATURATION: 95 % | SYSTOLIC BLOOD PRESSURE: 122 MMHG | BODY MASS INDEX: 45.99 KG/M2 | TEMPERATURE: 98 F

## 2021-03-08 DIAGNOSIS — E11.9 TYPE 2 DIABETES MELLITUS WITHOUT COMPLICATION, WITH LONG-TERM CURRENT USE OF INSULIN (HCC): ICD-10-CM

## 2021-03-08 DIAGNOSIS — D61.9 ANEMIA DUE TO BONE MARROW FAILURE, UNSPECIFIED BONE MARROW FAILURE TYPE (HCC): ICD-10-CM

## 2021-03-08 DIAGNOSIS — C90.01 MULTIPLE MYELOMA IN REMISSION (HCC): ICD-10-CM

## 2021-03-08 DIAGNOSIS — F33.9 EPISODE OF RECURRENT MAJOR DEPRESSIVE DISORDER, UNSPECIFIED DEPRESSION EPISODE SEVERITY (HCC): ICD-10-CM

## 2021-03-08 DIAGNOSIS — Z99.2 DIALYSIS PATIENT (HCC): ICD-10-CM

## 2021-03-08 DIAGNOSIS — R53.1 WEAKNESS: ICD-10-CM

## 2021-03-08 DIAGNOSIS — I89.0 LYMPHEDEMA: ICD-10-CM

## 2021-03-08 DIAGNOSIS — N18.6 ESRD (END STAGE RENAL DISEASE) (HCC): ICD-10-CM

## 2021-03-08 DIAGNOSIS — I10 ESSENTIAL HYPERTENSION: Primary | ICD-10-CM

## 2021-03-08 DIAGNOSIS — R10.9 ABDOMINAL PAIN, UNSPECIFIED ABDOMINAL LOCATION: ICD-10-CM

## 2021-03-08 DIAGNOSIS — Z79.4 TYPE 2 DIABETES MELLITUS WITHOUT COMPLICATION, WITH LONG-TERM CURRENT USE OF INSULIN (HCC): ICD-10-CM

## 2021-03-08 DIAGNOSIS — S42.401D CLOSED FRACTURE DISLOCATION OF RIGHT ELBOW WITH ROUTINE HEALING, SUBSEQUENT ENCOUNTER: ICD-10-CM

## 2021-03-08 DIAGNOSIS — E78.49 OTHER HYPERLIPIDEMIA: ICD-10-CM

## 2021-03-08 LAB
CARTRIDGE LOT#: ABNORMAL NUMERIC
HEMOGLOBIN A1C: 7.2 % (ref 4.3–5.6)

## 2021-03-08 PROCEDURE — 36416 COLLJ CAPILLARY BLOOD SPEC: CPT | Performed by: INTERNAL MEDICINE

## 2021-03-08 PROCEDURE — 99214 OFFICE O/P EST MOD 30 MIN: CPT | Performed by: INTERNAL MEDICINE

## 2021-03-08 PROCEDURE — 83036 HEMOGLOBIN GLYCOSYLATED A1C: CPT | Performed by: INTERNAL MEDICINE

## 2021-03-08 RX ORDER — TRAMADOL HYDROCHLORIDE 50 MG/1
50 TABLET ORAL EVERY 6 HOURS PRN
Qty: 30 TABLET | Refills: 0 | Status: SHIPPED | OUTPATIENT
Start: 2021-03-08 | End: 2021-09-07

## 2021-03-08 RX ORDER — MONTELUKAST SODIUM 10 MG/1
10 TABLET ORAL NIGHTLY
Qty: 90 TABLET | Refills: 3 | Status: SHIPPED | OUTPATIENT
Start: 2021-03-08

## 2021-03-08 NOTE — PROGRESS NOTES
HPI:    Patient ID: Stephen Deleon is a 61year old female. Presents for f/u    Still getting abd cramps and loose stools, especially with dialysis. Patient finds that the symptoms are worse if she eats too close to dialysis.   She denies any Current Outpatient Medications   Medication Sig Dispense Refill   • Montelukast Sodium 10 MG Oral Tab Take 1 tablet (10 mg total) by mouth nightly.  90 tablet 3   • insulin detemir 100 UNIT/ML Subcutaneous Solution Pen-injector Inject 32 Units into the sk MG Oral Cap Take 1 capsule daily for 21 days out of 28 day cycle.  Suzanne Caputo #[id number] (Patient not taking: Reported on 3/8/2021 ) 21 capsule 0   • dexamethasone (DECADRON) 4 MG tablet Take 10 tablets (40mg) by mouth once daily with food on days 1,8,15 per chemo of recurrent major depressive disorder, unspecified depression episode severity (hcc)  Esrd (end stage renal disease) (hcc)  Multiple myeloma in remission (hcc)  Anemia due to bone marrow failure, unspecified bone marrow failure type (hcc)  Lymphedema  Jyothi improved, though she does feel slightly limited in movement status post surgery--she will continue to work on this with therapy. Blood pressure is currently controlled. Patient will continue same rosuvastatin for management of hyperlipidemia.     Dionne

## 2021-03-11 ENCOUNTER — MED REC SCAN ONLY (OUTPATIENT)
Dept: INTERNAL MEDICINE CLINIC | Facility: CLINIC | Age: 66
End: 2021-03-11

## 2021-03-26 ENCOUNTER — TELEPHONE (OUTPATIENT)
Dept: INTERNAL MEDICINE CLINIC | Facility: CLINIC | Age: 66
End: 2021-03-26

## 2021-03-26 NOTE — TELEPHONE ENCOUNTER
Last office note from 3/8/21 faxed to [facility] at [address] recieved

## 2021-03-31 ENCOUNTER — TELEPHONE (OUTPATIENT)
Dept: INTERNAL MEDICINE CLINIC | Facility: CLINIC | Age: 66
End: 2021-03-31

## 2021-04-05 ENCOUNTER — TELEPHONE (OUTPATIENT)
Dept: INTERNAL MEDICINE CLINIC | Facility: CLINIC | Age: 66
End: 2021-04-05

## 2021-04-05 RX ORDER — INSULIN GLARGINE 100 [IU]/ML
32 INJECTION, SOLUTION SUBCUTANEOUS DAILY
Qty: 15 ML | Refills: 3 | Status: SHIPPED | OUTPATIENT
Start: 2021-04-05 | End: 2021-05-25

## 2021-04-05 NOTE — TELEPHONE ENCOUNTER
Spoke to pt -refill requested 4/1 and Rx was sent for insulin detemir 4/2 and a [employer] message was sent to the pt Rx was done [facility] daniela pt clarence). Her pharmacy also sent her a message;   She wants to know why no one called her  (to [facility]))  To Dr.H Doyle

## 2021-04-05 NOTE — TELEPHONE ENCOUNTER
[facility] [phone]. Fax confirmation received, sent to scan.

## 2021-04-05 NOTE — TELEPHONE ENCOUNTER
Pt. Is calling to see why her medication was changed from [address] to Detemir ph.  # [phone]   Routed to Rx

## 2021-04-09 ENCOUNTER — TELEPHONE (OUTPATIENT)
Dept: INTERNAL MEDICINE CLINIC | Facility: CLINIC | Age: 66
End: 2021-04-09

## 2021-04-09 RX ORDER — FUROSEMIDE 80 MG
TABLET ORAL
Qty: 180 TABLET | Refills: 3 | OUTPATIENT
Start: 2021-04-09

## 2021-04-09 NOTE — TELEPHONE ENCOUNTER
Per med module, furosemide was discontinued by  on 12/9/2020 when patient was discharged from the hospital. I am unable to find any documentation about this.  To  to please review and advise if patient should be taking

## 2021-04-13 PROCEDURE — 88342 IMHCHEM/IMCYTCHM 1ST ANTB: CPT | Performed by: INTERNAL MEDICINE

## 2021-04-13 PROCEDURE — 88291 CYTO/MOLECULAR REPORT: CPT | Performed by: INTERNAL MEDICINE

## 2021-04-13 PROCEDURE — 88237 TISSUE CULTURE BONE MARROW: CPT | Performed by: INTERNAL MEDICINE

## 2021-04-13 PROCEDURE — 88341 IMHCHEM/IMCYTCHM EA ADD ANTB: CPT | Performed by: INTERNAL MEDICINE

## 2021-04-13 PROCEDURE — 88185 FLOWCYTOMETRY/TC ADD-ON: CPT | Performed by: INTERNAL MEDICINE

## 2021-04-13 PROCEDURE — 88313 SPECIAL STAINS GROUP 2: CPT | Performed by: INTERNAL MEDICINE

## 2021-04-13 PROCEDURE — 88305 TISSUE EXAM BY PATHOLOGIST: CPT | Performed by: INTERNAL MEDICINE

## 2021-04-13 PROCEDURE — 88365 INSITU HYBRIDIZATION (FISH): CPT | Performed by: INTERNAL MEDICINE

## 2021-04-13 PROCEDURE — 88184 FLOWCYTOMETRY/ TC 1 MARKER: CPT | Performed by: INTERNAL MEDICINE

## 2021-04-13 PROCEDURE — 85097 BONE MARROW INTERPRETATION: CPT | Performed by: INTERNAL MEDICINE

## 2021-04-13 PROCEDURE — 88264 CHROMOSOME ANALYSIS 20-25: CPT | Performed by: INTERNAL MEDICINE

## 2021-05-04 ENCOUNTER — MED REC SCAN ONLY (OUTPATIENT)
Dept: INTERNAL MEDICINE CLINIC | Facility: CLINIC | Age: 66
End: 2021-05-04

## 2021-05-10 ENCOUNTER — MED REC SCAN ONLY (OUTPATIENT)
Dept: INTERNAL MEDICINE CLINIC | Facility: CLINIC | Age: 66
End: 2021-05-10

## 2021-05-24 NOTE — TELEPHONE ENCOUNTER
Pt called  Requests Rx for Saumya García   90 day supply is sent to mail order AIDAN/August  Pt has approx one week left    Pt can be reached at [phone]

## 2021-05-25 RX ORDER — INSULIN GLARGINE 100 [IU]/ML
32 INJECTION, SOLUTION SUBCUTANEOUS DAILY
Qty: 30 ML | Refills: 1 | Status: SHIPPED | OUTPATIENT
Start: 2021-05-25 | End: 2022-01-14

## 2021-06-03 ENCOUNTER — HOSPITAL ENCOUNTER (OUTPATIENT)
Age: 66
Discharge: HOME | End: 2021-06-03
Payer: MEDICARE

## 2021-06-03 ENCOUNTER — APPOINTMENT (OUTPATIENT)
Dept: GENERAL RADIOLOGY | Age: 66
End: 2021-06-03
Attending: NURSE PRACTITIONER
Payer: MEDICARE

## 2021-06-03 VITALS
HEART RATE: 88 BPM | RESPIRATION RATE: 17 BRPM | DIASTOLIC BLOOD PRESSURE: 92 MMHG | SYSTOLIC BLOOD PRESSURE: 110 MMHG | OXYGEN SATURATION: 96 % | TEMPERATURE: 98 F

## 2021-06-03 DIAGNOSIS — J02.0 ACUTE STREPTOCOCCAL PHARYNGITIS: Primary | ICD-10-CM

## 2021-06-03 DIAGNOSIS — Z20.822 ENCOUNTER FOR LABORATORY TESTING FOR COVID-19 VIRUS: ICD-10-CM

## 2021-06-03 PROCEDURE — 87880 STREP A ASSAY W/OPTIC: CPT | Performed by: NURSE PRACTITIONER

## 2021-06-03 PROCEDURE — 99213 OFFICE O/P EST LOW 20 MIN: CPT | Performed by: NURSE PRACTITIONER

## 2021-06-03 PROCEDURE — 71046 X-RAY EXAM CHEST 2 VIEWS: CPT | Performed by: NURSE PRACTITIONER

## 2021-06-03 PROCEDURE — U0002 COVID-19 LAB TEST NON-CDC: HCPCS | Performed by: NURSE PRACTITIONER

## 2021-06-03 RX ORDER — AZITHROMYCIN 500 MG/1
500 TABLET, FILM COATED ORAL DAILY
Qty: 5 TABLET | Refills: 0 | Status: SHIPPED | OUTPATIENT
Start: 2021-06-03 | End: 2021-06-08

## 2021-06-03 RX ORDER — LIDOCAINE 50 MG/G
PATCH TOPICAL
COMMUNITY
Start: 2020-11-17 | End: 2021-09-07

## 2021-06-03 NOTE — ED PROVIDER NOTES
Patient Seen in: [facility]      History   Patient presents with:  Cough/URI    Stated Complaint: Covid testing    HPI/Subjective:   HPI    This is a 68-year-old female presenting for cough sneezing and congestion for 5 days.   Patient states 88   Resp 17   Temp 97.7 °F (36.5 °C)   Temp src Temporal   SpO2 96 %   O2 Device None (Room air)       Current:BP (!) 110/92   Pulse 88   Temp 97.7 °F (36.5 °C) (Temporal)   Resp 17   LMP  (LMP Unknown)   SpO2 96%         Physical Exam  Vitals and nursing symptoms. Rapid strep Covid collected. Chest x-ray ordered. Anticipate discharge home with supportive therapy instructions and antibiotics if strep positive or chest x-ray shows pneumonia. Covid negative strep positive. Chest x-ray negative.   Discuss

## 2021-07-21 ENCOUNTER — TELEPHONE (OUTPATIENT)
Dept: INTERNAL MEDICINE CLINIC | Facility: CLINIC | Age: 66
End: 2021-07-21

## 2021-07-21 ENCOUNTER — HOSPITAL ENCOUNTER (EMERGENCY)
Facility: HOSPITAL | Age: 66
Discharge: HOME | End: 2021-07-21
Attending: EMERGENCY MEDICINE
Payer: MEDICARE

## 2021-07-21 VITALS
HEIGHT: 67 IN | BODY MASS INDEX: 45.99 KG/M2 | DIASTOLIC BLOOD PRESSURE: 62 MMHG | RESPIRATION RATE: 18 BRPM | TEMPERATURE: 98 F | SYSTOLIC BLOOD PRESSURE: 137 MMHG | HEART RATE: 88 BPM | WEIGHT: 293 LBS | OXYGEN SATURATION: 98 %

## 2021-07-21 DIAGNOSIS — L50.9 HIVES: Primary | ICD-10-CM

## 2021-07-21 PROCEDURE — 99283 EMERGENCY DEPT VISIT LOW MDM: CPT

## 2021-07-21 RX ORDER — DIPHENHYDRAMINE HCL 25 MG
25 CAPSULE ORAL EVERY 6 HOURS PRN
Qty: 14 CAPSULE | Refills: 0 | Status: SHIPPED | OUTPATIENT
Start: 2021-07-21 | End: 2021-08-23

## 2021-07-21 RX ORDER — PREDNISONE 20 MG/1
60 TABLET ORAL DAILY
Qty: 15 TABLET | Refills: 0 | Status: SHIPPED | OUTPATIENT
Start: 2021-07-21 | End: 2021-07-26

## 2021-07-21 RX ORDER — DIPHENHYDRAMINE HCL 25 MG
50 CAPSULE ORAL ONCE
Status: COMPLETED | OUTPATIENT
Start: 2021-07-21 | End: 2021-07-21

## 2021-07-21 RX ORDER — PREDNISONE 20 MG/1
60 TABLET ORAL ONCE
Status: COMPLETED | OUTPATIENT
Start: 2021-07-21 | End: 2021-07-21

## 2021-07-21 RX ORDER — DIPHENHYDRAMINE HCL 25 MG
25 CAPSULE ORAL ONCE
Status: DISCONTINUED | OUTPATIENT
Start: 2021-07-21 | End: 2021-07-21

## 2021-07-21 NOTE — ED QUICK NOTES
Pt to ED for red, itchy skin onset yesterday to generalized body. Pt reports using a new baby powder with lavender in it but states the rash came back after taking a shower and not using it. Airway is intact, RR even and unlabored.

## 2021-07-21 NOTE — ED PROVIDER NOTES
Patient Seen in: [facility] Emergency Department    History   Patient presents with:  Rash Skin Problem  Allergic Rxn Allergies      HPI    45-year-old female presents the ER with complaint of allergic reaction.   Patient states that the reaction sta Not on file    Occupational History      Occupation: retired postal employee    Tobacco Use      Smoking status: Never Smoker      Smokeless tobacco: Never Used    Vaping Use      Vaping Use: Never used    Substance and Sexual Activity      Alcohol use: No Palpations: Abdomen is soft. Musculoskeletal:         General: Normal range of motion. Cervical back: Normal range of motion and neck supple. Skin:     General: Skin is warm and dry.       Comments: Positive macular blanching rash to forearms, back Benadryl and Pepcid.     Condition upon leaving the department: Stable    Disposition and Plan     Clinical Impression:  Hives  (primary encounter diagnosis)    Disposition:  Discharge    Follow-up:  Jessie Darby MD  [address] [facility]

## 2021-07-21 NOTE — TELEPHONE ENCOUNTER
Most probable cause of her reaction was the Revlimid even though she has been on this in the past.  Therefore would continue to hold Revlimid and talk with hematologist/oncologist about other options.     The new powder that patient used would be less likel

## 2021-07-21 NOTE — TELEPHONE ENCOUNTER
Spoke with patient who reports she resumed Revlimid on Sunday (5mg daily for 21 days on and 7 days off). She stated having \"allergy issues\" on Monday, was really itchy but no hives at this point.  She called Dr. Marianne Atkins who told her that it would proba

## 2021-07-21 NOTE — TELEPHONE ENCOUNTER
Spoke to patient and relayed MD message and instructions, patient verbalizes understanding and agrees with plan. Offered contact info to Dr. Rupinder Lowe but patient says she plans to contact Dr. Marianne Atkins regarding the revlimid.  Encouraged her to call back with

## 2021-07-21 NOTE — TELEPHONE ENCOUNTER
Patient is calling she went [facility] ER this morning at 1 am for Hives  In the ER she was prescribed Bendryl & Prednisone    The only two things that are new  Patient went back on Revlamid on Sunday and she started using a new Baby Powder with Andree Villar  Could i

## 2021-07-21 NOTE — ED INITIAL ASSESSMENT (HPI)
Patient is dialysis patient and receives oral chemo for Multiple Myeloma. Started Revlimib 3 days ago and now has a rash to arms, back, and chest. Denies shortness of breath.

## 2021-08-30 ENCOUNTER — TELEPHONE (OUTPATIENT)
Dept: INTERNAL MEDICINE CLINIC | Facility: CLINIC | Age: 66
End: 2021-08-30

## 2021-08-30 DIAGNOSIS — R52 PAIN: Primary | ICD-10-CM

## 2021-08-30 RX ORDER — CYCLOBENZAPRINE HCL 10 MG
10 TABLET ORAL 3 TIMES DAILY PRN
Qty: 10 TABLET | Refills: 0 | Status: SHIPPED | OUTPATIENT
Start: 2021-08-30 | End: 2021-09-19

## 2021-08-30 NOTE — TELEPHONE ENCOUNTER
Because of plane trip and other risk factors, we have to be concerned about DVT. Therefore patient needs urgent ultrasound of lower extremities to rule out DVT. Order has been placed.   If ultrasound is negative, we can try small dose of muscle relaxant t

## 2021-08-30 NOTE — TELEPHONE ENCOUNTER
Pt. States she just returned from a trip to [place]. Pt. States she had to leave dialysis early due to severe leg cramping in both legs she want to know what she can do please advise ph.  # [phone]  Routed high to clinical

## 2021-08-30 NOTE — TELEPHONE ENCOUNTER
To Dr Haylie Cross  Please advise    C/O bilateral leg pain extending from upper leg to ankle since 8/27 after landing. Pt reports 6 hr flight to [place] on 8/27 and 4 1/2 hr return flight  on 8/29.   Denies SOB/ chest pain, pressure, tightness, leg edema, red

## 2021-09-07 ENCOUNTER — LAB ENCOUNTER (OUTPATIENT)
Dept: LAB | Age: 66
End: 2021-09-07
Attending: INTERNAL MEDICINE
Payer: MEDICARE

## 2021-09-07 ENCOUNTER — OFFICE VISIT (OUTPATIENT)
Dept: INTERNAL MEDICINE CLINIC | Facility: CLINIC | Age: 66
End: 2021-09-07
Payer: MEDICARE

## 2021-09-07 VITALS
BODY MASS INDEX: 55 KG/M2 | HEART RATE: 96 BPM | HEIGHT: 67 IN | TEMPERATURE: 99 F | DIASTOLIC BLOOD PRESSURE: 60 MMHG | SYSTOLIC BLOOD PRESSURE: 90 MMHG | OXYGEN SATURATION: 98 %

## 2021-09-07 DIAGNOSIS — E11.9 TYPE 2 DIABETES MELLITUS WITHOUT COMPLICATION, WITH LONG-TERM CURRENT USE OF INSULIN (HCC): ICD-10-CM

## 2021-09-07 DIAGNOSIS — D61.9 ANEMIA DUE TO BONE MARROW FAILURE, UNSPECIFIED BONE MARROW FAILURE TYPE (HCC): ICD-10-CM

## 2021-09-07 DIAGNOSIS — N18.6 ESRD (END STAGE RENAL DISEASE) (HCC): ICD-10-CM

## 2021-09-07 DIAGNOSIS — Z79.4 TYPE 2 DIABETES MELLITUS WITHOUT COMPLICATION, WITH LONG-TERM CURRENT USE OF INSULIN (HCC): ICD-10-CM

## 2021-09-07 DIAGNOSIS — I89.0 LYMPHEDEMA: ICD-10-CM

## 2021-09-07 DIAGNOSIS — Z00.00 PHYSICAL EXAM: ICD-10-CM

## 2021-09-07 DIAGNOSIS — E78.49 OTHER HYPERLIPIDEMIA: ICD-10-CM

## 2021-09-07 DIAGNOSIS — C90.01 MULTIPLE MYELOMA IN REMISSION (HCC): ICD-10-CM

## 2021-09-07 DIAGNOSIS — F33.9 EPISODE OF RECURRENT MAJOR DEPRESSIVE DISORDER, UNSPECIFIED DEPRESSION EPISODE SEVERITY (HCC): ICD-10-CM

## 2021-09-07 DIAGNOSIS — Z00.00 PHYSICAL EXAM: Primary | ICD-10-CM

## 2021-09-07 DIAGNOSIS — I10 PRIMARY HYPERTENSION: ICD-10-CM

## 2021-09-07 LAB
ALBUMIN SERPL-MCNC: 3.2 G/DL (ref 3.4–5)
ALBUMIN/GLOB SERPL: 0.7 {RATIO} (ref 1–2)
ALP LIVER SERPL-CCNC: 87 U/L
ALT SERPL-CCNC: 11 U/L
ANION GAP SERPL CALC-SCNC: 7 MMOL/L (ref 0–18)
AST SERPL-CCNC: 9 U/L (ref 15–37)
BASOPHILS # BLD AUTO: 0.13 X10(3) UL (ref 0–0.2)
BASOPHILS NFR BLD AUTO: 1 %
BILIRUB SERPL-MCNC: 0.3 MG/DL (ref 0.1–2)
BUN BLD-MCNC: 43 MG/DL (ref 7–18)
BUN/CREAT SERPL: 6.2 (ref 10–20)
CALCIUM BLD-MCNC: 9.6 MG/DL (ref 8.5–10.1)
CHLORIDE SERPL-SCNC: 102 MMOL/L (ref 98–112)
CHOLEST SMN-MCNC: 177 MG/DL (ref ?–200)
CO2 SERPL-SCNC: 29 MMOL/L (ref 21–32)
CREAT BLD-MCNC: 6.95 MG/DL
DEPRECATED RDW RBC AUTO: 49.6 FL (ref 35.1–46.3)
EOSINOPHIL # BLD AUTO: 0.32 X10(3) UL (ref 0–0.7)
EOSINOPHIL NFR BLD AUTO: 2.6 %
ERYTHROCYTE [DISTWIDTH] IN BLOOD BY AUTOMATED COUNT: 12.8 % (ref 11–15)
EST. AVERAGE GLUCOSE BLD GHB EST-MCNC: 214 MG/DL (ref 68–126)
GLOBULIN PLAS-MCNC: 4.5 G/DL (ref 2.8–4.4)
GLUCOSE BLD-MCNC: 178 MG/DL (ref 70–99)
HBA1C MFR BLD HPLC: 9.1 % (ref ?–5.7)
HCT VFR BLD AUTO: 31 %
HDLC SERPL-MCNC: 39 MG/DL (ref 40–59)
HGB BLD-MCNC: 9.7 G/DL
IMM GRANULOCYTES # BLD AUTO: 0.27 X10(3) UL (ref 0–1)
IMM GRANULOCYTES NFR BLD: 2.2 %
LDLC SERPL CALC-MCNC: 114 MG/DL (ref ?–100)
LYMPHOCYTES # BLD AUTO: 2.32 X10(3) UL (ref 1–4)
LYMPHOCYTES NFR BLD AUTO: 18.5 %
M PROTEIN MFR SERPL ELPH: 7.7 G/DL (ref 6.4–8.2)
MCH RBC QN AUTO: 33.1 PG (ref 26–34)
MCHC RBC AUTO-ENTMCNC: 31.3 G/DL (ref 31–37)
MCV RBC AUTO: 105.8 FL
MONOCYTES # BLD AUTO: 1.02 X10(3) UL (ref 0.1–1)
MONOCYTES NFR BLD AUTO: 8.1 %
NEUTROPHILS # BLD AUTO: 8.47 X10 (3) UL (ref 1.5–7.7)
NEUTROPHILS # BLD AUTO: 8.47 X10(3) UL (ref 1.5–7.7)
NEUTROPHILS NFR BLD AUTO: 67.6 %
NONHDLC SERPL-MCNC: 138 MG/DL (ref ?–130)
OSMOLALITY SERPL CALC.SUM OF ELEC: 301 MOSM/KG (ref 275–295)
PATIENT FASTING Y/N/NP: NO
PATIENT FASTING Y/N/NP: NO
PLATELET # BLD AUTO: 233 10(3)UL (ref 150–450)
POTASSIUM SERPL-SCNC: 5.2 MMOL/L (ref 3.5–5.1)
RBC # BLD AUTO: 2.93 X10(6)UL
SODIUM SERPL-SCNC: 138 MMOL/L (ref 136–145)
TRIGL SERPL-MCNC: 135 MG/DL (ref 30–149)
TSI SER-ACNC: 2.03 MIU/ML (ref 0.36–3.74)
VLDLC SERPL CALC-MCNC: 23 MG/DL (ref 0–30)
WBC # BLD AUTO: 12.5 X10(3) UL (ref 4–11)

## 2021-09-07 PROCEDURE — 85025 COMPLETE CBC W/AUTO DIFF WBC: CPT

## 2021-09-07 PROCEDURE — 36415 COLL VENOUS BLD VENIPUNCTURE: CPT

## 2021-09-07 PROCEDURE — 80061 LIPID PANEL: CPT

## 2021-09-07 PROCEDURE — G0439 PPPS, SUBSEQ VISIT: HCPCS | Performed by: INTERNAL MEDICINE

## 2021-09-07 PROCEDURE — 80053 COMPREHEN METABOLIC PANEL: CPT

## 2021-09-07 PROCEDURE — 84443 ASSAY THYROID STIM HORMONE: CPT

## 2021-09-07 PROCEDURE — 83036 HEMOGLOBIN GLYCOSYLATED A1C: CPT

## 2021-09-07 RX ORDER — ESCITALOPRAM OXALATE 10 MG/1
10 TABLET ORAL DAILY
Qty: 30 TABLET | Refills: 3 | Status: SHIPPED | OUTPATIENT
Start: 2021-09-07 | End: 2022-01-14

## 2021-09-07 RX ORDER — LIDOCAINE 50 MG/G
1 PATCH TOPICAL EVERY 24 HOURS
Qty: 30 PATCH | Refills: 3 | Status: SHIPPED | OUTPATIENT
Start: 2021-09-07

## 2021-09-07 RX ORDER — TRAMADOL HYDROCHLORIDE 50 MG/1
50 TABLET ORAL EVERY 6 HOURS PRN
Qty: 30 TABLET | Refills: 0 | Status: SHIPPED | OUTPATIENT
Start: 2021-09-07

## 2021-09-07 NOTE — PROGRESS NOTES
HPI:    Patient ID: Marilyn Smith is a 61year old female. Presents for physical exam.    Feeling overwhelmed, tearing more easily    Notes abd pain with dialysis is better since getting higher doses of heparin with dialysis.   Also had been d Occupation: retired postal employee    Tobacco Use      Smoking status: Never Smoker      Smokeless tobacco: Never Used    Vaping Use      Vaping Use: Never used    Substance and Sexual Activity      Alcohol use: No        Alcohol/week: 0.0 standard drinks by mouth daily as needed. 0   • Blood Glucose Monitoring Suppl (ACCU-CHEK AFIA SMARTVIEW) W/DEVICE Does not apply Kit 1 strip by Does not apply route 2 (two) times daily.  1 kit 6   • cyclobenzaprine 10 MG Oral Tab Take 1 tablet (10 mg total) by mouth 3 Neurological:      General: No focal deficit present. Mental Status: She is alert and oriented to person, place, and time. Cranial Nerves: No cranial nerve deficit.       Coordination: Coordination normal.   Psychiatric:         Attention and Pe bone marrow transplant at this time. She continues to follow-up with oncology for further management. Patient has been tolerating dialysis well. She will continue and not 3 times weekly basis.     She has chronic anemia secondary to renal failure, mult AL#[id number]

## 2021-09-08 ENCOUNTER — TELEPHONE (OUTPATIENT)
Dept: INTERNAL MEDICINE CLINIC | Facility: CLINIC | Age: 66
End: 2021-09-08

## 2021-09-08 DIAGNOSIS — C90.00 MULTIPLE MYELOMA, REMISSION STATUS UNSPECIFIED (HCC): Primary | ICD-10-CM

## 2021-09-08 NOTE — TELEPHONE ENCOUNTER
Spoke with [employer] who reports they would need an order or referral requesting a motorized scooter. Order would need to include patient's weight, height, and MD's NPI number.  They would also need a face sheet and clinical notes indicating

## 2021-09-08 NOTE — TELEPHONE ENCOUNTER
Pt. Is calling because she found a DME company for a scooter. [employer] would like a call from Dr. Bhavya Da Silva to call so they can tell him what information they are looking for.  They don't use forms ph. # [phone]  [facility]' ph. # [phone]  Ro

## 2021-10-25 NOTE — TELEPHONE ENCOUNTER
Managed Care, DME referral still open. Does it need to be authorized or can send DME referral to company as is? Please advise.

## 2021-10-25 NOTE — TELEPHONE ENCOUNTER
Patient calling on status of order for motorized scooter. DME is stating they have not received anything from us.     Best call back number [phone]

## 2021-10-26 ENCOUNTER — TELEPHONE (OUTPATIENT)
Dept: INTERNAL MEDICINE CLINIC | Facility: CLINIC | Age: 66
End: 2021-10-26

## 2021-10-26 ENCOUNTER — ORDER TRANSCRIPTION (OUTPATIENT)
Dept: ADMINISTRATIVE | Facility: HOSPITAL | Age: 66
End: 2021-10-26

## 2021-10-26 DIAGNOSIS — Z12.31 ENCOUNTER FOR SCREENING MAMMOGRAM FOR MALIGNANT NEOPLASM OF BREAST: Primary | ICD-10-CM

## 2021-10-26 DIAGNOSIS — R92.8 ABNORMAL MAMMOGRAM: Primary | ICD-10-CM

## 2021-10-26 NOTE — TELEPHONE ENCOUNTER
Good Afternoon Dr Nonie Gaucher and staff,    Referral# [id number] has been authorized    [facility] does not obtain the authorization for DME orders. DME facility would request the authorization.     Thank you,    KIRA JULES

## 2021-10-26 NOTE — TELEPHONE ENCOUNTER
Pt. Was told by radiology that she needs an order for a diagnostic mammogram ph.  # [phone]  Routed to clinical

## 2021-10-27 ENCOUNTER — TELEPHONE (OUTPATIENT)
Dept: INTERNAL MEDICINE CLINIC | Facility: CLINIC | Age: 66
End: 2021-10-27

## 2021-10-27 NOTE — TELEPHONE ENCOUNTER
Spoke with patient to inform her this has been done. She has it scheduled for November 3. Dr. Pedrito Coon-- patient asks that I let you know she switched oncologists to Dr. Casper Rodriguez at [facility].  They did a PET scan about 3 weeks ago and found something in the

## 2021-10-27 NOTE — TELEPHONE ENCOUNTER
Pt called to check status on motorized scooter she requested, [employer] advised patient they do not have any information from Dr Queen Chung & that our office should give [employer] a call     Please follow up with [employer] & with

## 2021-10-27 NOTE — TELEPHONE ENCOUNTER
Referral faxed to [address] at number below, confirmation received. Noted that MD used multiple myeloma as dx for motorized scooter.  Attempted to call [employer] to see if this being mentioned in the last OV is OK for clinical notes

## 2021-10-28 NOTE — TELEPHONE ENCOUNTER
Pt. Is calling back she spoke with [facility] and they nerve received her order for her scooter. Please re-fax to # [phone]  Lacy's ph.  # [phone]  Routed to clinical

## 2021-10-28 NOTE — TELEPHONE ENCOUNTER
Meg/[employer] called  Requesting additional information  Referral needs to include patient demographics-address/phone number/height/weight/insurance  Clinicals notes to support - why patient needs scooter  Please fax to:  FAX#:  [phone]

## 2021-10-28 NOTE — TELEPHONE ENCOUNTER
Spoke to [place] at [facility][1] who reports they have not received order. Order and demographics sheet refaxed to verified fax # [phone], confirmation received.  She reports mentioning the dx listed on the order in the last OV will not be e

## 2021-11-02 NOTE — TELEPHONE ENCOUNTER
Spoke to pt, clarified results for MRI, states MRI was not performed at [facility] but at [facility]. States sched to have Mammogram USN 11/3. Also inquiring  about order for motorized scooter order which was sent on 9/29.  States that [employer] claims yes

## 2021-11-03 ENCOUNTER — TELEPHONE (OUTPATIENT)
Dept: INTERNAL MEDICINE CLINIC | Facility: CLINIC | Age: 66
End: 2021-11-03

## 2021-11-03 NOTE — TELEPHONE ENCOUNTER
Cristian Peace / [facility] is calling to request documentation why patient needs a scooter  She said she faxed a request over on 10/28    Fax # [phone]

## 2021-11-05 ENCOUNTER — TELEPHONE (OUTPATIENT)
Dept: INTERNAL MEDICINE CLINIC | Facility: CLINIC | Age: 66
End: 2021-11-05

## 2021-11-05 NOTE — TELEPHONE ENCOUNTER
Called patient and explained that mammograms were ordered since 2016 - all  .  She should call [facility] to find out if she had it done there -no records here -verbalized understanding

## 2021-11-05 NOTE — TELEPHONE ENCOUNTER
Annette Pascal / Darryl Aiken is calling they didn't receive the progress notes please refax  Also requesting the CMN be faxed    Fax #[phone]

## 2021-11-05 NOTE — TELEPHONE ENCOUNTER
Last oprogress note faxed faxed to [facility] from [facility] [address] at [phone]-[id number] recieved

## 2021-11-05 NOTE — TELEPHONE ENCOUNTER
To DR.H Yanira Vieyra from [facility] will refax MARY VALERO needs to date and sign and fax back to number on sheet. She will let us know if faxed progress note was received - to DR. SAMSON

## 2021-11-05 NOTE — TELEPHONE ENCOUNTER
Please call patient to advise when/where she had her last mammogram, thinks it was more than 5 years ago & may have been at [facility] but she cannot remember     Pt cannot schedule mammo appt at [address] [place] [address] without getting her previous results    Call back # [phone]

## 2021-11-09 NOTE — TELEPHONE ENCOUNTER
Pt called to check status on information [employer] needs for her scooter  Please follow up with patient when sent

## 2021-11-11 NOTE — TELEPHONE ENCOUNTER
Patient is calling she spoke to Renetta Rosen they still have not received the CMN for the Scooter    Please fax to a new number fax #[phone]

## 2021-11-11 NOTE — TELEPHONE ENCOUNTER
Spoke to [place] from [facility] and requested form to be re-faxed as it is unable to be located. [place] reports that they had a different fax number on file for our office. Advised to fax to [phone].  Upon completion, form to be faxed to 61

## 2021-11-12 NOTE — TELEPHONE ENCOUNTER
Received by fax from [facility] criteria for insurance cover of scooter/power wheelchair. Placed in Dr. Ronnie Hernandez.

## 2021-11-12 NOTE — TELEPHONE ENCOUNTER
[employer] is calling. The order faxed does not meet the correct criteria. [employer] states they send a cheat sheet with the request of correct criteria to be used.  [employer] was asked to fax over the cheat

## 2021-11-12 NOTE — TELEPHONE ENCOUNTER
Confirmation of order form faxed back to [employer], confirmation received. Original sent to scan, copy in hold bin.

## 2021-11-12 NOTE — TELEPHONE ENCOUNTER
HPI Comments: 52 y.o. male with past medical history significant for Kidney Stones and Depression who presents from home with chief complaint of flank pain. Patient states onset at 2130 tonight of left flank pain. Patient reports constant left flank pain described as \"3/10\" pain. Patient reports accompanying nausea. Patient reports taking Ibuprofen (800 mg) for pain at 2200 with slight relief. Patient reports history of kidney stones and notes symptoms presented today feel similar but are less severe. Patient's urologist is with [facility]. Pt denies fever, chills, cough, congestion, shortness of breath, chest pain, abdominal pain, vomiting, diarrhea, difficulty with urination or dysuria. There are no other acute medical concerns at this time. Note written by Linda Guerra, as dictated by Caroline Givens MD 1:38 AM 
 
 
 
The history is provided by the patient. Past Medical History:  
Diagnosis Date  Kidney stones  Psychiatric disorder Depression No past surgical history on file. No family history on file. Social History Social History  Marital status:  Spouse name: N/A  
 Number of children: N/A  
 Years of education: N/A Occupational History  Not on file. Social History Main Topics  Smoking status: Never Smoker  Smokeless tobacco: Never Used  Alcohol use Not on file  Drug use: Not on file  Sexual activity: Not on file Other Topics Concern  Not on file Social History Narrative  No narrative on file ALLERGIES: Review of patient's allergies indicates no known allergies. Review of Systems Constitutional: Negative for chills and fever. HENT: Negative for congestion. Respiratory: Negative for cough and shortness of breath. Gastrointestinal: Negative for abdominal pain, constipation, diarrhea, nausea and vomiting. Genitourinary: Positive for flank pain.  Negative for difficulty urinating To  and dysuria. All other systems reviewed and are negative. Vitals:  
 09/13/18 0035 BP: (!) 144/92 Pulse: 67 Resp: 16 Temp: 97.9 °F (36.6 °C) SpO2: 98% Weight: 81.6 kg (180 lb) Height: 5' 10\" (1.778 m) Physical Exam  
Nursing note and vitals reviewed. CONSTITUTIONAL: Well-appearing; well-nourished; in mild distress HEAD: Normocephalic; atraumatic EYES: PERRL; EOM intact; conjunctiva and sclera are clear bilaterally. ENT: No rhinorrhea; normal pharynx with no tonsillar hypertrophy; mucous membranes pink/moist, no erythema, no exudate. NECK: Supple; non-tender; no cervical lymphadenopathy CARD: Normal S1, S2; no murmurs, rubs, or gallops. Regular rate and rhythm. RESP: Normal respiratory effort; breath sounds clear and equal bilaterally; no wheezes, rhonchi, or rales. ABD: Normal bowel sounds; non-distended; non-tender; no palpable organomegaly, no masses, no bruits. Back Exam: Normal inspection; no vertebral point tenderness, Right CVA tenderness. Normal range of motion. EXT: Normal ROM in all four extremities; non-tender to palpation; no swelling or deformity; distal pulses are normal, no edema. SKIN: Warm; dry; no rash. NEURO:Alert and oriented x 3, coherent, VLAD-XII grossly intact, sensory and motor are non-focal. 
 
 
 
MDM Number of Diagnoses or Management Options Kidney stone:  
Diagnosis management comments: Assessment: 81-year-old male, who presents with left flank pain, and history of kidney stone, rule out obstructive uropathy, diverticular disease, pyelonephritis, myofascial strain, and constipation. Plan: lab/ IV fluid/ antiemetics and analgesia/ CT scan of the abdomen and pelvis/ serial exam/ Monitor and Reevaluate. Amount and/or Complexity of Data Reviewed Clinical lab tests: reviewed and ordered Tests in the radiology section of CPT®: ordered and reviewed Tests in the medicine section of CPT®: reviewed and ordered Discussion of test results with the performing providers: yes Decide to obtain previous medical records or to obtain history from someone other than the patient: yes Obtain history from someone other than the patient: yes Review and summarize past medical records: yes Discuss the patient with other providers: yes Independent visualization of images, tracings, or specimens: yes ED Course Procedures Progress Note:  
Pt has been reexamined by Stephanie James MD. Pt is feeling much better. Symptoms have improved. All available results have been reviewed with pt and any available family. Pt understands sx, dx, and tx in ED. Care plan has been outlined and questions have been answered. Pt is ready to go home. Will send home on kidney stone instruction. Prescription of Norco, Zofran, and Flomax. Outpatient referral with urology for reevaluation and further treatment as needed. Written by Stephanie James MD,2:50 AM 
 
. Qasim Hanna

## 2021-11-16 NOTE — TELEPHONE ENCOUNTER
Ezekiel Hightower / [place] is calling they still need the supporting clinical notes  They did receive the CNM    Please fax notes to [phone]

## 2021-11-18 NOTE — TELEPHONE ENCOUNTER
Azalia/[employer] called  Office notes that just were received do not meet criteria for the scooter    Wording they are looking for insurance to cover was faxed to Dr Katharine Martini on 11/12    Please re send with addendum based on criteria that was faxed 11/

## 2021-11-19 NOTE — TELEPHONE ENCOUNTER
To  - called Azalia from [facility]- she will fax another sheet with an example that shows criteria for scooter ( medicare wants specifics) please let your nurse call Luis Client once you received this form.  Also the office note needs to m

## 2021-12-27 RX ORDER — ESCITALOPRAM OXALATE 10 MG/1
TABLET ORAL
Qty: 90 TABLET | Refills: 1 | OUTPATIENT
Start: 2021-12-27

## 2022-01-01 ENCOUNTER — APPOINTMENT (OUTPATIENT)
Dept: GENERAL RADIOLOGY | Facility: HOSPITAL | Age: 67
End: 2022-01-01
Attending: INTERNAL MEDICINE
Payer: MEDICARE

## 2022-01-01 ENCOUNTER — APPOINTMENT (OUTPATIENT)
Dept: CT IMAGING | Facility: HOSPITAL | Age: 67
End: 2022-01-01
Attending: INTERNAL MEDICINE
Payer: MEDICARE

## 2022-01-01 ENCOUNTER — TELEPHONE (OUTPATIENT)
Dept: INTERNAL MEDICINE CLINIC | Facility: CLINIC | Age: 67
End: 2022-01-01

## 2022-01-01 ENCOUNTER — HOSPITAL ENCOUNTER (INPATIENT)
Facility: HOSPITAL | Age: 67
LOS: 6 days | Discharge: HOME HEALTH | End: 2022-01-01
Attending: EMERGENCY MEDICINE | Admitting: INTERNAL MEDICINE
Payer: MEDICARE

## 2022-01-01 ENCOUNTER — APPOINTMENT (OUTPATIENT)
Dept: CV DIAGNOSTICS | Facility: HOSPITAL | Age: 67
End: 2022-01-01
Attending: INTERNAL MEDICINE
Payer: MEDICARE

## 2022-01-01 ENCOUNTER — APPOINTMENT (OUTPATIENT)
Dept: MRI IMAGING | Facility: HOSPITAL | Age: 67
End: 2022-01-01
Attending: INTERNAL MEDICINE
Payer: MEDICARE

## 2022-01-01 ENCOUNTER — APPOINTMENT (OUTPATIENT)
Dept: GENERAL RADIOLOGY | Facility: HOSPITAL | Age: 67
End: 2022-01-01
Attending: EMERGENCY MEDICINE
Payer: MEDICARE

## 2022-01-01 VITALS
BODY MASS INDEX: 53 KG/M2 | WEIGHT: 293 LBS | DIASTOLIC BLOOD PRESSURE: 47 MMHG | TEMPERATURE: 98 F | SYSTOLIC BLOOD PRESSURE: 132 MMHG | RESPIRATION RATE: 20 BRPM | HEART RATE: 90 BPM | OXYGEN SATURATION: 93 %

## 2022-01-01 DIAGNOSIS — E11.69 DIABETES MELLITUS TYPE 2 IN OBESE (HCC): ICD-10-CM

## 2022-01-01 DIAGNOSIS — N18.6 ESRD (END STAGE RENAL DISEASE) (HCC): ICD-10-CM

## 2022-01-01 DIAGNOSIS — E66.9 DIABETES MELLITUS TYPE 2 IN OBESE (HCC): ICD-10-CM

## 2022-01-01 DIAGNOSIS — J18.9 PNEUMONIA DUE TO INFECTIOUS ORGANISM, UNSPECIFIED LATERALITY, UNSPECIFIED PART OF LUNG: Primary | ICD-10-CM

## 2022-01-01 DIAGNOSIS — R09.02 HYPOXIA: ICD-10-CM

## 2022-01-01 LAB
ADENOVIRUS PCR:: NOT DETECTED
ALBUMIN SERPL-MCNC: 2 G/DL (ref 3.4–5)
ALBUMIN SERPL-MCNC: 2.2 G/DL (ref 3.4–5)
ALBUMIN SERPL-MCNC: 2.3 G/DL (ref 3.4–5)
ALBUMIN SERPL-MCNC: 2.4 G/DL (ref 3.4–5)
ALBUMIN/GLOB SERPL: 0.4 {RATIO} (ref 1–2)
ALBUMIN/GLOB SERPL: 0.5 {RATIO} (ref 1–2)
ALP LIVER SERPL-CCNC: 59 U/L
ALP LIVER SERPL-CCNC: 74 U/L
ALP LIVER SERPL-CCNC: 78 U/L
ALT SERPL-CCNC: 6 U/L
ALT SERPL-CCNC: 8 U/L
ALT SERPL-CCNC: <6 U/L
ANION GAP SERPL CALC-SCNC: 11 MMOL/L (ref 0–18)
ANION GAP SERPL CALC-SCNC: 15 MMOL/L (ref 0–18)
ANION GAP SERPL CALC-SCNC: 16 MMOL/L (ref 0–18)
ANION GAP SERPL CALC-SCNC: 7 MMOL/L (ref 0–18)
ANION GAP SERPL CALC-SCNC: 8 MMOL/L (ref 0–18)
ANION GAP SERPL CALC-SCNC: 9 MMOL/L (ref 0–18)
ANTIBODY SCREEN: NEGATIVE
AST SERPL-CCNC: 5 U/L (ref 15–37)
AST SERPL-CCNC: 5 U/L (ref 15–37)
AST SERPL-CCNC: 7 U/L (ref 15–37)
B PARAPERT DNA SPEC QL NAA+PROBE: NOT DETECTED
B PERT DNA SPEC QL NAA+PROBE: NOT DETECTED
BASOPHILS # BLD: 0 X10(3) UL (ref 0–0.2)
BASOPHILS # BLD: 0.17 X10(3) UL (ref 0–0.2)
BASOPHILS # BLD: 0.18 X10(3) UL (ref 0–0.2)
BASOPHILS # BLD: 0.33 X10(3) UL (ref 0–0.2)
BASOPHILS # BLD: 0.35 X10(3) UL (ref 0–0.2)
BASOPHILS NFR BLD: 0 %
BASOPHILS NFR BLD: 1 %
BASOPHILS NFR BLD: 1 %
BASOPHILS NFR BLD: 2 %
BASOPHILS NFR BLD: 2 %
BILIRUB DIRECT SERPL-MCNC: 0.1 MG/DL (ref 0–0.2)
BILIRUB SERPL-MCNC: 0.2 MG/DL (ref 0.1–2)
BILIRUB SERPL-MCNC: 0.3 MG/DL (ref 0.1–2)
BILIRUB SERPL-MCNC: 0.4 MG/DL (ref 0.1–2)
BILIRUB UR QL: NEGATIVE
BUN BLD-MCNC: 23 MG/DL (ref 7–18)
BUN BLD-MCNC: 29 MG/DL (ref 7–18)
BUN BLD-MCNC: 35 MG/DL (ref 7–18)
BUN BLD-MCNC: 36 MG/DL (ref 7–18)
BUN BLD-MCNC: 43 MG/DL (ref 7–18)
BUN BLD-MCNC: 64 MG/DL (ref 7–18)
BUN/CREAT SERPL: 4.1 (ref 10–20)
BUN/CREAT SERPL: 4.6 (ref 10–20)
BUN/CREAT SERPL: 4.6 (ref 10–20)
BUN/CREAT SERPL: 4.9 (ref 10–20)
BUN/CREAT SERPL: 4.9 (ref 10–20)
BUN/CREAT SERPL: 5.2 (ref 10–20)
BUN/CREAT SERPL: 6.8 (ref 10–20)
BUN/CREAT SERPL: 9.6 (ref 10–20)
C PNEUM DNA SPEC QL NAA+PROBE: NOT DETECTED
CALCIUM BLD-MCNC: 8 MG/DL (ref 8.5–10.1)
CALCIUM BLD-MCNC: 8.4 MG/DL (ref 8.5–10.1)
CALCIUM BLD-MCNC: 8.4 MG/DL (ref 8.5–10.1)
CALCIUM BLD-MCNC: 8.5 MG/DL (ref 8.5–10.1)
CALCIUM BLD-MCNC: 8.6 MG/DL (ref 8.5–10.1)
CALCIUM BLD-MCNC: 8.7 MG/DL (ref 8.5–10.1)
CALCIUM BLD-MCNC: 9.4 MG/DL (ref 8.5–10.1)
CALCIUM BLD-MCNC: 9.6 MG/DL (ref 8.5–10.1)
CHLORIDE SERPL-SCNC: 101 MMOL/L (ref 98–112)
CHLORIDE SERPL-SCNC: 102 MMOL/L (ref 98–112)
CHLORIDE SERPL-SCNC: 103 MMOL/L (ref 98–112)
CHLORIDE SERPL-SCNC: 103 MMOL/L (ref 98–112)
CHLORIDE SERPL-SCNC: 106 MMOL/L (ref 98–112)
CHLORIDE SERPL-SCNC: 108 MMOL/L (ref 98–112)
CHLORIDE SERPL-SCNC: 99 MMOL/L (ref 98–112)
CHLORIDE SERPL-SCNC: 99 MMOL/L (ref 98–112)
CHOLEST SERPL-MCNC: 111 MG/DL (ref ?–200)
CO2 SERPL-SCNC: 20 MMOL/L (ref 21–32)
CO2 SERPL-SCNC: 24 MMOL/L (ref 21–32)
CO2 SERPL-SCNC: 25 MMOL/L (ref 21–32)
CO2 SERPL-SCNC: 27 MMOL/L (ref 21–32)
CO2 SERPL-SCNC: 29 MMOL/L (ref 21–32)
CO2 SERPL-SCNC: 30 MMOL/L (ref 21–32)
COLOR UR: YELLOW
CORONAVIRUS 229E PCR:: NOT DETECTED
CORONAVIRUS HKU1 PCR:: NOT DETECTED
CORONAVIRUS NL63 PCR:: NOT DETECTED
CORONAVIRUS OC43 PCR:: NOT DETECTED
CREAT BLD-MCNC: 5.16 MG/DL
CREAT BLD-MCNC: 5.66 MG/DL
CREAT BLD-MCNC: 5.96 MG/DL
CREAT BLD-MCNC: 6.35 MG/DL
CREAT BLD-MCNC: 6.36 MG/DL
CREAT BLD-MCNC: 6.67 MG/DL
CREAT BLD-MCNC: 7.3 MG/DL
CREAT BLD-MCNC: 8.26 MG/DL
D DIMER PPP FEU-MCNC: 0.6 UG/ML FEU (ref ?–0.65)
DEPRECATED RDW RBC AUTO: 58.8 FL (ref 35.1–46.3)
DEPRECATED RDW RBC AUTO: 59 FL (ref 35.1–46.3)
DEPRECATED RDW RBC AUTO: 60.2 FL (ref 35.1–46.3)
DEPRECATED RDW RBC AUTO: 61 FL (ref 35.1–46.3)
DEPRECATED RDW RBC AUTO: 61 FL (ref 35.1–46.3)
DEPRECATED RDW RBC AUTO: 62 FL (ref 35.1–46.3)
DEPRECATED RDW RBC AUTO: 62.5 FL (ref 35.1–46.3)
EOSINOPHIL # BLD: 0 X10(3) UL (ref 0–0.7)
EOSINOPHIL # BLD: 0.18 X10(3) UL (ref 0–0.7)
EOSINOPHIL NFR BLD: 0 %
EOSINOPHIL NFR BLD: 1 %
ERYTHROCYTE [DISTWIDTH] IN BLOOD BY AUTOMATED COUNT: 14.5 % (ref 11–15)
ERYTHROCYTE [DISTWIDTH] IN BLOOD BY AUTOMATED COUNT: 14.6 % (ref 11–15)
ERYTHROCYTE [DISTWIDTH] IN BLOOD BY AUTOMATED COUNT: 14.9 % (ref 11–15)
ERYTHROCYTE [DISTWIDTH] IN BLOOD BY AUTOMATED COUNT: 14.9 % (ref 11–15)
EST. AVERAGE GLUCOSE BLD GHB EST-MCNC: 169 MG/DL (ref 68–126)
FLUAV RNA SPEC QL NAA+PROBE: NOT DETECTED
FLUBV RNA SPEC QL NAA+PROBE: NOT DETECTED
GLOBULIN PLAS-MCNC: 4.2 G/DL (ref 2.8–4.4)
GLOBULIN PLAS-MCNC: 5.5 G/DL (ref 2.8–4.4)
GLUCOSE BLD-MCNC: 137 MG/DL (ref 70–99)
GLUCOSE BLD-MCNC: 167 MG/DL (ref 70–99)
GLUCOSE BLD-MCNC: 182 MG/DL (ref 70–99)
GLUCOSE BLD-MCNC: 203 MG/DL (ref 70–99)
GLUCOSE BLD-MCNC: 297 MG/DL (ref 70–99)
GLUCOSE BLD-MCNC: 330 MG/DL (ref 70–99)
GLUCOSE BLD-MCNC: 51 MG/DL (ref 70–99)
GLUCOSE BLD-MCNC: 97 MG/DL (ref 70–99)
GLUCOSE BLDC GLUCOMTR-MCNC: 103 MG/DL (ref 70–99)
GLUCOSE BLDC GLUCOMTR-MCNC: 113 MG/DL (ref 70–99)
GLUCOSE BLDC GLUCOMTR-MCNC: 120 MG/DL (ref 70–99)
GLUCOSE BLDC GLUCOMTR-MCNC: 131 MG/DL (ref 70–99)
GLUCOSE BLDC GLUCOMTR-MCNC: 138 MG/DL (ref 70–99)
GLUCOSE BLDC GLUCOMTR-MCNC: 149 MG/DL (ref 70–99)
GLUCOSE BLDC GLUCOMTR-MCNC: 154 MG/DL (ref 70–99)
GLUCOSE BLDC GLUCOMTR-MCNC: 164 MG/DL (ref 70–99)
GLUCOSE BLDC GLUCOMTR-MCNC: 182 MG/DL (ref 70–99)
GLUCOSE BLDC GLUCOMTR-MCNC: 193 MG/DL (ref 70–99)
GLUCOSE BLDC GLUCOMTR-MCNC: 215 MG/DL (ref 70–99)
GLUCOSE BLDC GLUCOMTR-MCNC: 220 MG/DL (ref 70–99)
GLUCOSE BLDC GLUCOMTR-MCNC: 231 MG/DL (ref 70–99)
GLUCOSE BLDC GLUCOMTR-MCNC: 249 MG/DL (ref 70–99)
GLUCOSE BLDC GLUCOMTR-MCNC: 268 MG/DL (ref 70–99)
GLUCOSE BLDC GLUCOMTR-MCNC: 270 MG/DL (ref 70–99)
GLUCOSE BLDC GLUCOMTR-MCNC: 296 MG/DL (ref 70–99)
GLUCOSE BLDC GLUCOMTR-MCNC: 320 MG/DL (ref 70–99)
GLUCOSE BLDC GLUCOMTR-MCNC: 322 MG/DL (ref 70–99)
GLUCOSE BLDC GLUCOMTR-MCNC: 324 MG/DL (ref 70–99)
GLUCOSE BLDC GLUCOMTR-MCNC: 330 MG/DL (ref 70–99)
GLUCOSE BLDC GLUCOMTR-MCNC: 342 MG/DL (ref 70–99)
GLUCOSE BLDC GLUCOMTR-MCNC: 54 MG/DL (ref 70–99)
GLUCOSE BLDC GLUCOMTR-MCNC: 60 MG/DL (ref 70–99)
GLUCOSE BLDC GLUCOMTR-MCNC: 94 MG/DL (ref 70–99)
GLUCOSE BLDC GLUCOMTR-MCNC: 95 MG/DL (ref 70–99)
GLUCOSE UR-MCNC: >=500 MG/DL
HBA1C MFR BLD: 7.5 % (ref ?–5.7)
HCT VFR BLD AUTO: 28.3 %
HCT VFR BLD AUTO: 28.5 %
HCT VFR BLD AUTO: 29.9 %
HCT VFR BLD AUTO: 30.1 %
HCT VFR BLD AUTO: 30.2 %
HCT VFR BLD AUTO: 32.2 %
HCT VFR BLD AUTO: 32.4 %
HDLC SERPL-MCNC: 26 MG/DL (ref 40–59)
HGB BLD-MCNC: 8 G/DL
HGB BLD-MCNC: 8.2 G/DL
HGB BLD-MCNC: 8.4 G/DL
HGB BLD-MCNC: 8.8 G/DL
HGB BLD-MCNC: 8.8 G/DL
HGB BLD-MCNC: 9.3 G/DL
HGB BLD-MCNC: 9.5 G/DL
HGB UR QL STRIP.AUTO: NEGATIVE
KETONES UR-MCNC: NEGATIVE MG/DL
LACTATE SERPL-SCNC: 2.8 MMOL/L (ref 0.4–2)
LACTATE SERPL-SCNC: 3.2 MMOL/L (ref 0.4–2)
LACTATE SERPL-SCNC: 3.5 MMOL/L (ref 0.4–2)
LDLC SERPL CALC-MCNC: 53 MG/DL (ref ?–100)
LYMPHOCYTES NFR BLD: 0.35 X10(3) UL (ref 1–4)
LYMPHOCYTES NFR BLD: 0.6 X10(3) UL (ref 1–4)
LYMPHOCYTES NFR BLD: 1.39 X10(3) UL (ref 1–4)
LYMPHOCYTES NFR BLD: 1.45 X10(3) UL (ref 1–4)
LYMPHOCYTES NFR BLD: 1.61 X10(3) UL (ref 1–4)
LYMPHOCYTES NFR BLD: 1.79 X10(3) UL (ref 1–4)
LYMPHOCYTES NFR BLD: 11 %
LYMPHOCYTES NFR BLD: 2 %
LYMPHOCYTES NFR BLD: 20 %
LYMPHOCYTES NFR BLD: 3 %
LYMPHOCYTES NFR BLD: 3.26 X10(3) UL (ref 1–4)
LYMPHOCYTES NFR BLD: 6 %
LYMPHOCYTES NFR BLD: 8 %
LYMPHOCYTES NFR BLD: 9 %
MCH RBC QN AUTO: 32.6 PG (ref 26–34)
MCH RBC QN AUTO: 32.6 PG (ref 26–34)
MCH RBC QN AUTO: 32.7 PG (ref 26–34)
MCH RBC QN AUTO: 32.8 PG (ref 26–34)
MCH RBC QN AUTO: 32.8 PG (ref 26–34)
MCHC RBC AUTO-ENTMCNC: 28.1 G/DL (ref 31–37)
MCHC RBC AUTO-ENTMCNC: 28.1 G/DL (ref 31–37)
MCHC RBC AUTO-ENTMCNC: 28.7 G/DL (ref 31–37)
MCHC RBC AUTO-ENTMCNC: 29 G/DL (ref 31–37)
MCHC RBC AUTO-ENTMCNC: 29.1 G/DL (ref 31–37)
MCHC RBC AUTO-ENTMCNC: 29.2 G/DL (ref 31–37)
MCHC RBC AUTO-ENTMCNC: 29.5 G/DL (ref 31–37)
MCV RBC AUTO: 110.7 FL
MCV RBC AUTO: 111.9 FL
MCV RBC AUTO: 112.3 FL
MCV RBC AUTO: 113.2 FL
MCV RBC AUTO: 114.1 FL
MCV RBC AUTO: 115.9 FL
MCV RBC AUTO: 116.8 FL
METAMYELOCYTES # BLD: 0.16 X10(3) UL
METAMYELOCYTES # BLD: 0.17 X10(3) UL
METAMYELOCYTES # BLD: 0.17 X10(3) UL
METAMYELOCYTES # BLD: 0.33 X10(3) UL
METAMYELOCYTES # BLD: 0.36 X10(3) UL
METAMYELOCYTES NFR BLD: 1 %
METAMYELOCYTES NFR BLD: 2 %
METAMYELOCYTES NFR BLD: 2 %
METAPNEUMOVIRUS PCR:: NOT DETECTED
MONOCYTES # BLD: 0.49 X10(3) UL (ref 0.1–1)
MONOCYTES # BLD: 0.52 X10(3) UL (ref 0.1–1)
MONOCYTES # BLD: 0.8 X10(3) UL (ref 0.1–1)
MONOCYTES # BLD: 1.07 X10(3) UL (ref 0.1–1)
MONOCYTES # BLD: 1.22 X10(3) UL (ref 0.1–1)
MONOCYTES # BLD: 1.47 X10(3) UL (ref 0.1–1)
MONOCYTES # BLD: 2.41 X10(3) UL (ref 0.1–1)
MONOCYTES NFR BLD: 10 %
MONOCYTES NFR BLD: 3 %
MONOCYTES NFR BLD: 3 %
MONOCYTES NFR BLD: 4 %
MONOCYTES NFR BLD: 6 %
MONOCYTES NFR BLD: 7 %
MONOCYTES NFR BLD: 9 %
MYCOPLASMA PNEUMONIA PCR:: NOT DETECTED
MYELOCYTES # BLD: 0.16 X10(3) UL
MYELOCYTES # BLD: 0.17 X10(3) UL
MYELOCYTES # BLD: 0.17 X10(3) UL
MYELOCYTES # BLD: 0.33 X10(3) UL
MYELOCYTES # BLD: 0.36 X10(3) UL
MYELOCYTES NFR BLD: 1 %
MYELOCYTES NFR BLD: 2 %
MYELOCYTES NFR BLD: 2 %
NEUTROPHILS # BLD AUTO: 10.21 X10 (3) UL (ref 1.5–7.7)
NEUTROPHILS # BLD AUTO: 10.4 X10 (3) UL (ref 1.5–7.7)
NEUTROPHILS # BLD AUTO: 10.45 X10 (3) UL (ref 1.5–7.7)
NEUTROPHILS # BLD AUTO: 10.61 X10 (3) UL (ref 1.5–7.7)
NEUTROPHILS # BLD AUTO: 12.77 X10 (3) UL (ref 1.5–7.7)
NEUTROPHILS # BLD AUTO: 14.87 X10 (3) UL (ref 1.5–7.7)
NEUTROPHILS # BLD AUTO: 18.78 X10 (3) UL (ref 1.5–7.7)
NEUTROPHILS NFR BLD: 69 %
NEUTROPHILS NFR BLD: 71 %
NEUTROPHILS NFR BLD: 74 %
NEUTROPHILS NFR BLD: 77 %
NEUTROPHILS NFR BLD: 84 %
NEUTROPHILS NFR BLD: 89 %
NEUTROPHILS NFR BLD: 91 %
NEUTS BAND NFR BLD: 1 %
NEUTS BAND NFR BLD: 2 %
NEUTS BAND NFR BLD: 2 %
NEUTS BAND NFR BLD: 5 %
NEUTS BAND NFR BLD: 5 %
NEUTS BAND NFR BLD: 8 %
NEUTS HYPERSEG # BLD: 11.74 X10(3) UL (ref 1.5–7.7)
NEUTS HYPERSEG # BLD: 12.55 X10(3) UL (ref 1.5–7.7)
NEUTS HYPERSEG # BLD: 14.14 X10(3) UL (ref 1.5–7.7)
NEUTS HYPERSEG # BLD: 14.27 X10(3) UL (ref 1.5–7.7)
NEUTS HYPERSEG # BLD: 15.74 X10(3) UL (ref 1.5–7.7)
NEUTS HYPERSEG # BLD: 18.69 X10(3) UL (ref 1.5–7.7)
NEUTS HYPERSEG # BLD: 20.24 X10(3) UL (ref 1.5–7.7)
NITRITE UR QL STRIP.AUTO: NEGATIVE
NONHDLC SERPL-MCNC: 85 MG/DL (ref ?–130)
NRBC BLD MANUAL-RTO: 1 %
NT-PROBNP SERPL-MCNC: ABNORMAL PG/ML (ref ?–125)
OSMOLALITY SERPL CALC.SUM OF ELEC: 287 MOSM/KG (ref 275–295)
OSMOLALITY SERPL CALC.SUM OF ELEC: 291 MOSM/KG (ref 275–295)
OSMOLALITY SERPL CALC.SUM OF ELEC: 298 MOSM/KG (ref 275–295)
OSMOLALITY SERPL CALC.SUM OF ELEC: 302 MOSM/KG (ref 275–295)
OSMOLALITY SERPL CALC.SUM OF ELEC: 303 MOSM/KG (ref 275–295)
OSMOLALITY SERPL CALC.SUM OF ELEC: 311 MOSM/KG (ref 275–295)
OSMOLALITY SERPL CALC.SUM OF ELEC: 311 MOSM/KG (ref 275–295)
OSMOLALITY SERPL CALC.SUM OF ELEC: 317 MOSM/KG (ref 275–295)
PARAINFLUENZA 1 PCR:: NOT DETECTED
PARAINFLUENZA 2 PCR:: NOT DETECTED
PARAINFLUENZA 3 PCR:: NOT DETECTED
PARAINFLUENZA 4 PCR:: NOT DETECTED
PH UR: 8 [PH] (ref 5–8)
PHOSPHATE SERPL-MCNC: 4.7 MG/DL (ref 2.5–4.9)
PHOSPHATE SERPL-MCNC: 5.6 MG/DL (ref 2.5–4.9)
PLATELET # BLD AUTO: 216 10(3)UL (ref 150–450)
PLATELET # BLD AUTO: 246 10(3)UL (ref 150–450)
PLATELET # BLD AUTO: 248 10(3)UL (ref 150–450)
PLATELET # BLD AUTO: 272 10(3)UL (ref 150–450)
PLATELET # BLD AUTO: 288 10(3)UL (ref 150–450)
PLATELET # BLD AUTO: 296 10(3)UL (ref 150–450)
PLATELET # BLD AUTO: 308 10(3)UL (ref 150–450)
PLATELET MORPHOLOGY: NORMAL
POTASSIUM SERPL-SCNC: 3.8 MMOL/L (ref 3.5–5.1)
POTASSIUM SERPL-SCNC: 4.3 MMOL/L (ref 3.5–5.1)
POTASSIUM SERPL-SCNC: 4.5 MMOL/L (ref 3.5–5.1)
POTASSIUM SERPL-SCNC: 4.9 MMOL/L (ref 3.5–5.1)
POTASSIUM SERPL-SCNC: 5 MMOL/L (ref 3.5–5.1)
POTASSIUM SERPL-SCNC: 5 MMOL/L (ref 3.5–5.1)
POTASSIUM SERPL-SCNC: 5.1 MMOL/L (ref 3.5–5.1)
PROCALCITONIN SERPL-MCNC: 1.14 NG/ML (ref ?–0.16)
PROT SERPL-MCNC: 6.2 G/DL (ref 6.4–8.2)
PROT SERPL-MCNC: 6.5 G/DL (ref 6.4–8.2)
PROT SERPL-MCNC: 7.9 G/DL (ref 6.4–8.2)
PROT UR-MCNC: 100 MG/DL
RBC # BLD AUTO: 2.44 X10(6)UL
RBC # BLD AUTO: 2.5 X10(6)UL
RBC # BLD AUTO: 2.58 X10(6)UL
RBC # BLD AUTO: 2.69 X10(6)UL
RBC # BLD AUTO: 2.69 X10(6)UL
RBC # BLD AUTO: 2.84 X10(6)UL
RBC # BLD AUTO: 2.91 X10(6)UL
RH BLOOD TYPE: POSITIVE
RHINOVIRUS/ENTERO PCR:: NOT DETECTED
RSV RNA SPEC QL NAA+PROBE: NOT DETECTED
SARS-COV-2 RNA NPH QL NAA+NON-PROBE: NOT DETECTED
SARS-COV-2 RNA RESP QL NAA+PROBE: NOT DETECTED
SODIUM SERPL-SCNC: 135 MMOL/L (ref 136–145)
SODIUM SERPL-SCNC: 137 MMOL/L (ref 136–145)
SODIUM SERPL-SCNC: 138 MMOL/L (ref 136–145)
SODIUM SERPL-SCNC: 139 MMOL/L (ref 136–145)
SODIUM SERPL-SCNC: 140 MMOL/L (ref 136–145)
SODIUM SERPL-SCNC: 141 MMOL/L (ref 136–145)
SODIUM SERPL-SCNC: 144 MMOL/L (ref 136–145)
SODIUM SERPL-SCNC: 144 MMOL/L (ref 136–145)
SP GR UR STRIP: 1.01 (ref 1–1.03)
TOTAL CELLS COUNTED BLD: 100
TRIGL SERPL-MCNC: 191 MG/DL (ref 30–149)
TROPONIN I HIGH SENSITIVITY: 65 NG/L
TROPONIN I HIGH SENSITIVITY: 67 NG/L
UROBILINOGEN UR STRIP-ACNC: <2
VIT C UR-MCNC: NEGATIVE MG/DL
VLDLC SERPL CALC-MCNC: 28 MG/DL (ref 0–30)
WBC # BLD AUTO: 16.3 X10(3) UL (ref 4–11)
WBC # BLD AUTO: 16.3 X10(3) UL (ref 4–11)
WBC # BLD AUTO: 17.3 X10(3) UL (ref 4–11)
WBC # BLD AUTO: 17.4 X10(3) UL (ref 4–11)
WBC # BLD AUTO: 17.9 X10(3) UL (ref 4–11)
WBC # BLD AUTO: 20.1 X10(3) UL (ref 4–11)
WBC # BLD AUTO: 24.1 X10(3) UL (ref 4–11)

## 2022-01-01 PROCEDURE — 71250 CT THORAX DX C-: CPT | Performed by: INTERNAL MEDICINE

## 2022-01-01 PROCEDURE — 99232 SBSQ HOSP IP/OBS MODERATE 35: CPT | Performed by: INTERNAL MEDICINE

## 2022-01-01 PROCEDURE — 71045 X-RAY EXAM CHEST 1 VIEW: CPT | Performed by: EMERGENCY MEDICINE

## 2022-01-01 PROCEDURE — 70450 CT HEAD/BRAIN W/O DYE: CPT | Performed by: INTERNAL MEDICINE

## 2022-01-01 PROCEDURE — 99223 1ST HOSP IP/OBS HIGH 75: CPT | Performed by: INTERNAL MEDICINE

## 2022-01-01 PROCEDURE — 5A1D70Z PERFORMANCE OF URINARY FILTRATION, INTERMITTENT, LESS THAN 6 HOURS PER DAY: ICD-10-PCS | Performed by: INTERNAL MEDICINE

## 2022-01-01 PROCEDURE — 5A09357 ASSISTANCE WITH RESPIRATORY VENTILATION, LESS THAN 24 CONSECUTIVE HOURS, CONTINUOUS POSITIVE AIRWAY PRESSURE: ICD-10-PCS | Performed by: INTERNAL MEDICINE

## 2022-01-01 PROCEDURE — 99232 SBSQ HOSP IP/OBS MODERATE 35: CPT | Performed by: PHYSICIAN ASSISTANT

## 2022-01-01 PROCEDURE — 99233 SBSQ HOSP IP/OBS HIGH 50: CPT | Performed by: INTERNAL MEDICINE

## 2022-01-01 PROCEDURE — 93306 TTE W/DOPPLER COMPLETE: CPT | Performed by: INTERNAL MEDICINE

## 2022-01-01 PROCEDURE — 99239 HOSP IP/OBS DSCHRG MGMT >30: CPT | Performed by: INTERNAL MEDICINE

## 2022-01-01 PROCEDURE — 70551 MRI BRAIN STEM W/O DYE: CPT | Performed by: INTERNAL MEDICINE

## 2022-01-01 PROCEDURE — 99222 1ST HOSP IP/OBS MODERATE 55: CPT | Performed by: INTERNAL MEDICINE

## 2022-01-01 PROCEDURE — 71045 X-RAY EXAM CHEST 1 VIEW: CPT | Performed by: INTERNAL MEDICINE

## 2022-01-01 RX ORDER — METOCLOPRAMIDE HYDROCHLORIDE 5 MG/ML
5 INJECTION INTRAMUSCULAR; INTRAVENOUS EVERY 8 HOURS PRN
Status: DISCONTINUED | OUTPATIENT
Start: 2022-01-01 | End: 2022-01-01

## 2022-01-01 RX ORDER — POLYETHYLENE GLYCOL 3350 17 G/17G
17 POWDER, FOR SOLUTION ORAL DAILY PRN
Status: DISCONTINUED | OUTPATIENT
Start: 2022-01-01 | End: 2022-01-01

## 2022-01-01 RX ORDER — BENZONATATE 100 MG/1
200 CAPSULE ORAL EVERY 4 HOURS PRN
Status: DISCONTINUED | OUTPATIENT
Start: 2022-01-01 | End: 2022-01-01

## 2022-01-01 RX ORDER — FAMOTIDINE 20 MG/1
20 TABLET, FILM COATED ORAL DAILY
Status: DISCONTINUED | OUTPATIENT
Start: 2022-01-01 | End: 2022-01-01

## 2022-01-01 RX ORDER — LORATADINE 10 MG/1
10 TABLET ORAL DAILY
COMMUNITY
Start: 2022-01-01

## 2022-01-01 RX ORDER — HEPARIN SODIUM (PORCINE) LOCK FLUSH IV SOLN 100 UNIT/ML 100 UNIT/ML
500 SOLUTION INTRAVENOUS ONCE
Status: COMPLETED | OUTPATIENT
Start: 2022-01-01 | End: 2022-01-01

## 2022-01-01 RX ORDER — SENNOSIDES 8.6 MG
17.2 TABLET ORAL NIGHTLY PRN
Status: DISCONTINUED | OUTPATIENT
Start: 2022-01-01 | End: 2022-01-01

## 2022-01-01 RX ORDER — INSULIN LISPRO 100 [IU]/ML
INJECTION, SOLUTION INTRAVENOUS; SUBCUTANEOUS
Qty: 6 EACH | Refills: 3 | Status: SHIPPED | OUTPATIENT
Start: 2022-01-01 | End: 2022-01-01

## 2022-01-01 RX ORDER — HEPARIN SODIUM 5000 [USP'U]/ML
5000 INJECTION, SOLUTION INTRAVENOUS; SUBCUTANEOUS EVERY 8 HOURS SCHEDULED
Status: DISCONTINUED | OUTPATIENT
Start: 2022-01-01 | End: 2022-01-01

## 2022-01-01 RX ORDER — DEXTROSE MONOHYDRATE 25 G/50ML
50 INJECTION, SOLUTION INTRAVENOUS
Status: DISCONTINUED | OUTPATIENT
Start: 2022-01-01 | End: 2022-01-01

## 2022-01-01 RX ORDER — DEXAMETHASONE 4 MG/1
TABLET ORAL
Qty: 26 TABLET | Refills: 0 | Status: SHIPPED | OUTPATIENT
Start: 2022-01-01 | End: 2022-01-01

## 2022-01-01 RX ORDER — TRAMADOL HYDROCHLORIDE 50 MG/1
50 TABLET ORAL EVERY 6 HOURS PRN
Status: CANCELLED | OUTPATIENT
Start: 2022-01-01

## 2022-01-01 RX ORDER — HEPARIN SODIUM 1000 [USP'U]/ML
500 INJECTION, SOLUTION INTRAVENOUS; SUBCUTANEOUS ONCE
Status: DISCONTINUED | OUTPATIENT
Start: 2022-01-01 | End: 2022-01-01 | Stop reason: CLARIF

## 2022-01-01 RX ORDER — ENOXAPARIN SODIUM 100 MG/ML
40 INJECTION SUBCUTANEOUS DAILY
Status: DISCONTINUED | OUTPATIENT
Start: 2022-01-01 | End: 2022-01-01

## 2022-01-01 RX ORDER — ACETAMINOPHEN 500 MG
500 TABLET ORAL EVERY 4 HOURS PRN
Status: DISCONTINUED | OUTPATIENT
Start: 2022-01-01 | End: 2022-01-01

## 2022-01-01 RX ORDER — HEPARIN SODIUM (PORCINE) LOCK FLUSH IV SOLN 100 UNIT/ML 100 UNIT/ML
5 SOLUTION INTRAVENOUS ONCE
Status: COMPLETED | OUTPATIENT
Start: 2022-01-01 | End: 2022-01-01

## 2022-01-01 RX ORDER — FAMOTIDINE 40 MG/1
40 TABLET, FILM COATED ORAL DAILY
COMMUNITY
Start: 2022-01-01

## 2022-01-01 RX ORDER — HEPARIN SODIUM (PORCINE) LOCK FLUSH IV SOLN 100 UNIT/ML 100 UNIT/ML
SOLUTION INTRAVENOUS
Status: COMPLETED
Start: 2022-01-01 | End: 2022-01-01

## 2022-01-01 RX ORDER — FAMOTIDINE 20 MG/1
40 TABLET, FILM COATED ORAL DAILY
Status: DISCONTINUED | OUTPATIENT
Start: 2022-01-01 | End: 2022-01-01

## 2022-01-01 RX ORDER — NICOTINE POLACRILEX 4 MG
15 LOZENGE BUCCAL
Status: DISCONTINUED | OUTPATIENT
Start: 2022-01-01 | End: 2022-01-01

## 2022-01-01 RX ORDER — AZELASTINE 1 MG/ML
2 SPRAY, METERED NASAL 2 TIMES DAILY
COMMUNITY
Start: 2022-01-01

## 2022-01-01 RX ORDER — DEXAMETHASONE 4 MG/1
4 TABLET ORAL 2 TIMES DAILY
Status: DISCONTINUED | OUTPATIENT
Start: 2022-01-01 | End: 2022-01-01

## 2022-01-01 RX ORDER — BISACODYL 10 MG
10 SUPPOSITORY, RECTAL RECTAL
Status: DISCONTINUED | OUTPATIENT
Start: 2022-01-01 | End: 2022-01-01

## 2022-01-01 RX ORDER — INSULIN GLARGINE 100 [IU]/ML
48 INJECTION, SOLUTION SUBCUTANEOUS DAILY
Qty: 6 EACH | Refills: 6 | Status: SHIPPED | OUTPATIENT
Start: 2022-01-01

## 2022-01-01 RX ORDER — IPRATROPIUM BROMIDE 21 UG/1
2 SPRAY, METERED NASAL 2 TIMES DAILY
Status: DISCONTINUED | OUTPATIENT
Start: 2022-01-01 | End: 2022-01-01

## 2022-01-01 RX ORDER — ALBUMIN (HUMAN) 12.5 G/50ML
100 SOLUTION INTRAVENOUS AS NEEDED
Status: DISCONTINUED | OUTPATIENT
Start: 2022-01-01 | End: 2022-01-01

## 2022-01-01 RX ORDER — NICOTINE POLACRILEX 4 MG
30 LOZENGE BUCCAL
Status: DISCONTINUED | OUTPATIENT
Start: 2022-01-01 | End: 2022-01-01

## 2022-01-01 RX ORDER — ASPIRIN 81 MG/1
81 TABLET ORAL DAILY
Status: CANCELLED | OUTPATIENT
Start: 2022-01-01

## 2022-01-01 RX ORDER — MONTELUKAST SODIUM 10 MG/1
10 TABLET ORAL NIGHTLY
Status: CANCELLED | OUTPATIENT
Start: 2022-01-01

## 2022-01-01 RX ORDER — IPRATROPIUM BROMIDE 21 UG/1
2 SPRAY, METERED NASAL 2 TIMES DAILY
COMMUNITY
Start: 2022-01-01

## 2022-01-01 RX ORDER — CETIRIZINE HYDROCHLORIDE 5 MG/1
5 TABLET ORAL DAILY
Status: DISCONTINUED | OUTPATIENT
Start: 2022-01-01 | End: 2022-01-01

## 2022-01-01 RX ORDER — ONDANSETRON 2 MG/ML
4 INJECTION INTRAMUSCULAR; INTRAVENOUS EVERY 6 HOURS PRN
Status: DISCONTINUED | OUTPATIENT
Start: 2022-01-01 | End: 2022-01-01

## 2022-01-14 ENCOUNTER — OFFICE VISIT (OUTPATIENT)
Dept: INTERNAL MEDICINE CLINIC | Facility: CLINIC | Age: 67
End: 2022-01-14
Payer: MEDICARE

## 2022-01-14 ENCOUNTER — TELEPHONE (OUTPATIENT)
Dept: INTERNAL MEDICINE CLINIC | Facility: CLINIC | Age: 67
End: 2022-01-14

## 2022-01-14 VITALS
HEART RATE: 77 BPM | DIASTOLIC BLOOD PRESSURE: 80 MMHG | SYSTOLIC BLOOD PRESSURE: 110 MMHG | TEMPERATURE: 99 F | HEIGHT: 67 IN | OXYGEN SATURATION: 95 % | WEIGHT: 293 LBS | BODY MASS INDEX: 45.99 KG/M2

## 2022-01-14 DIAGNOSIS — F32.A DEPRESSION, UNSPECIFIED DEPRESSION TYPE: ICD-10-CM

## 2022-01-14 DIAGNOSIS — N18.6 ESRD (END STAGE RENAL DISEASE) (HCC): ICD-10-CM

## 2022-01-14 DIAGNOSIS — I89.0 LYMPHEDEMA: ICD-10-CM

## 2022-01-14 DIAGNOSIS — G62.9 NEUROPATHY: ICD-10-CM

## 2022-01-14 DIAGNOSIS — D64.9 ANEMIA, UNSPECIFIED TYPE: ICD-10-CM

## 2022-01-14 DIAGNOSIS — E78.49 OTHER HYPERLIPIDEMIA: ICD-10-CM

## 2022-01-14 DIAGNOSIS — E11.69 DIABETES MELLITUS TYPE 2 IN OBESE (HCC): Primary | ICD-10-CM

## 2022-01-14 DIAGNOSIS — I10 PRIMARY HYPERTENSION: ICD-10-CM

## 2022-01-14 DIAGNOSIS — C90.01 MULTIPLE MYELOMA IN REMISSION (HCC): ICD-10-CM

## 2022-01-14 DIAGNOSIS — E66.9 DIABETES MELLITUS TYPE 2 IN OBESE (HCC): Primary | ICD-10-CM

## 2022-01-14 LAB
CARTRIDGE LOT#: ABNORMAL NUMERIC
HEMOGLOBIN A1C: 7.5 % (ref 4.3–5.6)

## 2022-01-14 PROCEDURE — 83036 HEMOGLOBIN GLYCOSYLATED A1C: CPT | Performed by: INTERNAL MEDICINE

## 2022-01-14 PROCEDURE — 99214 OFFICE O/P EST MOD 30 MIN: CPT | Performed by: INTERNAL MEDICINE

## 2022-01-14 RX ORDER — PEN NEEDLE, DIABETIC 29 G X1/2"
NEEDLE, DISPOSABLE MISCELLANEOUS
Qty: 100 EACH | Refills: 3 | Status: SHIPPED | OUTPATIENT
Start: 2022-01-14 | End: 2022-01-14

## 2022-01-14 RX ORDER — PEN NEEDLE, DIABETIC 29 G X1/2"
NEEDLE, DISPOSABLE MISCELLANEOUS
Qty: 200 EACH | Refills: 3 | Status: SHIPPED | OUTPATIENT
Start: 2022-01-14

## 2022-01-14 RX ORDER — INSULIN GLARGINE 100 [IU]/ML
35 INJECTION, SOLUTION SUBCUTANEOUS DAILY
Qty: 6 EACH | Refills: 6 | Status: SHIPPED | OUTPATIENT
Start: 2022-01-14 | End: 2022-01-17

## 2022-01-14 NOTE — PROGRESS NOTES
HPI:    Patient ID: Jimmie Rajput is a 61year old female. Stopped taking lexapro--felt she got bad dreams.   Feels that her depression is situational secondary to her medical conditions and that she does not want to be on any antidepressant at not taking: Reported on 1/14/2022)  0     Allergies:  Lenalidomide            HIVES, RASH  Iodine (Topical)        HIVES  Radiology Contrast *    HIVES  Sulfa Antibiotics       RASH  Tetanus Toxoid          RASH  Vancomycin              RASH  Seasonal be on any antidepressant. She has stopped the Lexapro. Patient will continue to follow-up with oncologist at [facility] regarding management of her multiple myeloma. Patient will continue with 3 times weekly dialysis.     Blood pressure is under reasonable

## 2022-01-14 NOTE — TELEPHONE ENCOUNTER
[facility] requesting clarification for Pen Needles 1/2\" 29G X 12 mm  Requesting proper directions to bill ins, please clarify frequency of use  Placed in purple folder

## 2022-01-17 ENCOUNTER — TELEPHONE (OUTPATIENT)
Dept: INTERNAL MEDICINE CLINIC | Facility: CLINIC | Age: 67
End: 2022-01-17

## 2022-01-17 DIAGNOSIS — E66.9 DIABETES MELLITUS TYPE 2 IN OBESE (HCC): ICD-10-CM

## 2022-01-17 DIAGNOSIS — Z12.31 VISIT FOR SCREENING MAMMOGRAM: ICD-10-CM

## 2022-01-17 DIAGNOSIS — R05.9 COUGH: Primary | ICD-10-CM

## 2022-01-17 DIAGNOSIS — E11.69 DIABETES MELLITUS TYPE 2 IN OBESE (HCC): ICD-10-CM

## 2022-01-17 RX ORDER — BENZONATATE 200 MG/1
200 CAPSULE ORAL 3 TIMES DAILY PRN
Qty: 60 CAPSULE | Refills: 0 | Status: SHIPPED | OUTPATIENT
Start: 2022-01-17

## 2022-01-17 RX ORDER — INSULIN GLARGINE 100 [IU]/ML
35 INJECTION, SOLUTION SUBCUTANEOUS DAILY
Qty: 6 EACH | Refills: 6 | Status: SHIPPED | OUTPATIENT
Start: 2022-01-17

## 2022-01-17 NOTE — TELEPHONE ENCOUNTER
Pt is calling and states that her prescription for     Safia Garsia     was sent to the wrong pharmacy. The script was sent to [facility]. The refill was to be sent to [facility]. Please resend in the refill.       Any qu

## 2022-02-03 ENCOUNTER — LAB ENCOUNTER (OUTPATIENT)
Dept: LAB | Age: 67
End: 2022-02-03
Attending: SURGERY
Payer: MEDICARE

## 2022-02-03 ENCOUNTER — NURSE ONLY (OUTPATIENT)
Dept: LAB | Age: 67
End: 2022-02-03
Attending: SURGERY
Payer: MEDICARE

## 2022-02-03 DIAGNOSIS — Z01.818 PREOPERATIVE TESTING: ICD-10-CM

## 2022-02-03 LAB
ANTIBODY SCREEN: NEGATIVE
DIRECT COOMBS POLY: NEGATIVE
RH BLOOD TYPE: POSITIVE
SARS-COV-2 RNA RESP QL NAA+PROBE: NOT DETECTED

## 2022-02-03 PROCEDURE — 36415 COLL VENOUS BLD VENIPUNCTURE: CPT

## 2022-02-03 PROCEDURE — 86901 BLOOD TYPING SEROLOGIC RH(D): CPT

## 2022-02-03 PROCEDURE — 93010 ELECTROCARDIOGRAM REPORT: CPT | Performed by: SURGERY

## 2022-02-03 PROCEDURE — 93005 ELECTROCARDIOGRAM TRACING: CPT

## 2022-02-03 PROCEDURE — 86850 RBC ANTIBODY SCREEN: CPT

## 2022-02-03 PROCEDURE — 86880 COOMBS TEST DIRECT: CPT

## 2022-02-03 PROCEDURE — 86900 BLOOD TYPING SEROLOGIC ABO: CPT

## 2022-02-04 ENCOUNTER — TELEPHONE (OUTPATIENT)
Dept: INTERNAL MEDICINE CLINIC | Facility: CLINIC | Age: 67
End: 2022-02-04

## 2022-02-04 NOTE — TELEPHONE ENCOUNTER
Pt is calling and states she is having surgery on her Fistula tomorrow 2/5/22  At 8:30am.  Pt is asking if she should take her nightly(between 6-7pm) insuline shot the night before her surgery.     Please call and advise

## 2022-02-04 NOTE — TELEPHONE ENCOUNTER
Spoke with patient and relayed Dr. Telly Rivero message. Patient verbalized an understanding to all instructions-- she confirmed that she currently takes 35units of long acting insulin nightly and she confirmed that she will take 17units tonight (night before surgery).

## 2022-02-04 NOTE — TELEPHONE ENCOUNTER
Patient should take half of her usual long-acting insulin--our records show that she is on 28 units--therefore would recommend that patient take 17 units night prior to surgery.

## 2022-02-05 ENCOUNTER — ANESTHESIA EVENT (OUTPATIENT)
Dept: SURGERY | Facility: HOSPITAL | Age: 67
End: 2022-02-05
Payer: MEDICARE

## 2022-02-05 ENCOUNTER — HOSPITAL ENCOUNTER (OUTPATIENT)
Facility: HOSPITAL | Age: 67
Setting detail: HOSPITAL OUTPATIENT SURGERY
Discharge: HOME | End: 2022-02-05
Attending: SURGERY | Admitting: SURGERY
Payer: MEDICARE

## 2022-02-05 ENCOUNTER — ANESTHESIA (OUTPATIENT)
Dept: SURGERY | Facility: HOSPITAL | Age: 67
End: 2022-02-05
Payer: MEDICARE

## 2022-02-05 VITALS
TEMPERATURE: 98 F | HEIGHT: 67 IN | RESPIRATION RATE: 18 BRPM | BODY MASS INDEX: 45.99 KG/M2 | HEART RATE: 80 BPM | WEIGHT: 293 LBS | SYSTOLIC BLOOD PRESSURE: 139 MMHG | DIASTOLIC BLOOD PRESSURE: 52 MMHG | OXYGEN SATURATION: 91 %

## 2022-02-05 DIAGNOSIS — Z01.818 PREOPERATIVE TESTING: Primary | ICD-10-CM

## 2022-02-05 PROBLEM — T82.858A AV FISTULA STENOSIS (HCC): Status: ACTIVE | Noted: 2022-01-01

## 2022-02-05 PROBLEM — T82.858A AV FISTULA STENOSIS (HCC): Status: ACTIVE | Noted: 2022-02-05

## 2022-02-05 LAB
GLUCOSE BLDC GLUCOMTR-MCNC: 124 MG/DL (ref 70–99)
GLUCOSE BLDC GLUCOMTR-MCNC: 179 MG/DL (ref 70–99)
POTASSIUM SERPL-SCNC: 4.9 MMOL/L (ref 3.5–5.1)

## 2022-02-05 PROCEDURE — 82962 GLUCOSE BLOOD TEST: CPT

## 2022-02-05 PROCEDURE — 86920 COMPATIBILITY TEST SPIN: CPT

## 2022-02-05 PROCEDURE — 36591 DRAW BLOOD OFF VENOUS DEVICE: CPT | Performed by: SURGERY

## 2022-02-05 PROCEDURE — 36415 COLL VENOUS BLD VENIPUNCTURE: CPT | Performed by: SURGERY

## 2022-02-05 PROCEDURE — 84132 ASSAY OF SERUM POTASSIUM: CPT | Performed by: SURGERY

## 2022-02-05 PROCEDURE — 05LY0ZZ OCCLUSION OF UPPER VEIN, OPEN APPROACH: ICD-10-PCS | Performed by: SURGERY

## 2022-02-05 RX ORDER — HEPARIN SODIUM 1000 [USP'U]/ML
INJECTION, SOLUTION INTRAVENOUS; SUBCUTANEOUS AS NEEDED
Status: DISCONTINUED | OUTPATIENT
Start: 2022-02-05 | End: 2022-02-05 | Stop reason: SURG

## 2022-02-05 RX ORDER — MORPHINE SULFATE 10 MG/ML
6 INJECTION, SOLUTION INTRAMUSCULAR; INTRAVENOUS EVERY 10 MIN PRN
Status: DISCONTINUED | OUTPATIENT
Start: 2022-02-05 | End: 2022-02-05

## 2022-02-05 RX ORDER — ONDANSETRON 2 MG/ML
4 INJECTION INTRAMUSCULAR; INTRAVENOUS EVERY 6 HOURS PRN
Status: DISCONTINUED | OUTPATIENT
Start: 2022-02-05 | End: 2022-02-05

## 2022-02-05 RX ORDER — HYDROCODONE BITARTRATE AND ACETAMINOPHEN 10; 325 MG/1; MG/1
1 TABLET ORAL EVERY 4 HOURS PRN
Status: DISCONTINUED | OUTPATIENT
Start: 2022-02-05 | End: 2022-02-05

## 2022-02-05 RX ORDER — DEXTROSE MONOHYDRATE 25 G/50ML
50 INJECTION, SOLUTION INTRAVENOUS
Status: DISCONTINUED | OUTPATIENT
Start: 2022-02-05 | End: 2022-02-05

## 2022-02-05 RX ORDER — LABETALOL HYDROCHLORIDE 5 MG/ML
10 INJECTION, SOLUTION INTRAVENOUS ONCE
Status: COMPLETED | OUTPATIENT
Start: 2022-02-05 | End: 2022-02-05

## 2022-02-05 RX ORDER — PROCHLORPERAZINE EDISYLATE 5 MG/ML
5 INJECTION INTRAMUSCULAR; INTRAVENOUS ONCE AS NEEDED
Status: DISCONTINUED | OUTPATIENT
Start: 2022-02-05 | End: 2022-02-05

## 2022-02-05 RX ORDER — LIDOCAINE HYDROCHLORIDE 10 MG/ML
INJECTION, SOLUTION EPIDURAL; INFILTRATION; INTRACAUDAL; PERINEURAL AS NEEDED
Status: DISCONTINUED | OUTPATIENT
Start: 2022-02-05 | End: 2022-02-05 | Stop reason: SURG

## 2022-02-05 RX ORDER — MORPHINE SULFATE 4 MG/ML
4 INJECTION, SOLUTION INTRAMUSCULAR; INTRAVENOUS EVERY 10 MIN PRN
Status: DISCONTINUED | OUTPATIENT
Start: 2022-02-05 | End: 2022-02-05

## 2022-02-05 RX ORDER — BUPIVACAINE HYDROCHLORIDE 2.5 MG/ML
INJECTION, SOLUTION EPIDURAL; INFILTRATION; INTRACAUDAL AS NEEDED
Status: DISCONTINUED | OUTPATIENT
Start: 2022-02-05 | End: 2022-02-05 | Stop reason: HOSPADM

## 2022-02-05 RX ORDER — FAMOTIDINE 20 MG/1
20 TABLET, FILM COATED ORAL ONCE
Status: COMPLETED | OUTPATIENT
Start: 2022-02-05 | End: 2022-02-05

## 2022-02-05 RX ORDER — HYDROCODONE BITARTRATE AND ACETAMINOPHEN 5; 325 MG/1; MG/1
2 TABLET ORAL AS NEEDED
Status: DISCONTINUED | OUTPATIENT
Start: 2022-02-05 | End: 2022-02-05

## 2022-02-05 RX ORDER — NALOXONE HYDROCHLORIDE 0.4 MG/ML
80 INJECTION, SOLUTION INTRAMUSCULAR; INTRAVENOUS; SUBCUTANEOUS AS NEEDED
Status: DISCONTINUED | OUTPATIENT
Start: 2022-02-05 | End: 2022-02-05

## 2022-02-05 RX ORDER — SODIUM CHLORIDE, SODIUM LACTATE, POTASSIUM CHLORIDE, CALCIUM CHLORIDE 600; 310; 30; 20 MG/100ML; MG/100ML; MG/100ML; MG/100ML
INJECTION, SOLUTION INTRAVENOUS CONTINUOUS
Status: DISCONTINUED | OUTPATIENT
Start: 2022-02-05 | End: 2022-02-05

## 2022-02-05 RX ORDER — ROCURONIUM BROMIDE 10 MG/ML
INJECTION, SOLUTION INTRAVENOUS AS NEEDED
Status: DISCONTINUED | OUTPATIENT
Start: 2022-02-05 | End: 2022-02-05 | Stop reason: SURG

## 2022-02-05 RX ORDER — HALOPERIDOL 5 MG/ML
0.25 INJECTION INTRAMUSCULAR ONCE AS NEEDED
Status: DISCONTINUED | OUTPATIENT
Start: 2022-02-05 | End: 2022-02-05

## 2022-02-05 RX ORDER — HYDROCODONE BITARTRATE AND ACETAMINOPHEN 5; 325 MG/1; MG/1
1 TABLET ORAL AS NEEDED
Status: DISCONTINUED | OUTPATIENT
Start: 2022-02-05 | End: 2022-02-05

## 2022-02-05 RX ORDER — HYDROCODONE BITARTRATE AND ACETAMINOPHEN 10; 325 MG/1; MG/1
1 TABLET ORAL EVERY 6 HOURS PRN
Qty: 20 TABLET | Refills: 0 | Status: SHIPPED | OUTPATIENT
Start: 2022-02-05 | End: 2022-02-10

## 2022-02-05 RX ORDER — SODIUM CHLORIDE 9 MG/ML
INJECTION, SOLUTION INTRAVENOUS CONTINUOUS PRN
Status: DISCONTINUED | OUTPATIENT
Start: 2022-02-05 | End: 2022-02-05 | Stop reason: SURG

## 2022-02-05 RX ORDER — ONDANSETRON 2 MG/ML
INJECTION INTRAMUSCULAR; INTRAVENOUS AS NEEDED
Status: DISCONTINUED | OUTPATIENT
Start: 2022-02-05 | End: 2022-02-05 | Stop reason: SURG

## 2022-02-05 RX ORDER — NICOTINE POLACRILEX 4 MG
15 LOZENGE BUCCAL
Status: DISCONTINUED | OUTPATIENT
Start: 2022-02-05 | End: 2022-02-05

## 2022-02-05 RX ORDER — ONDANSETRON 2 MG/ML
4 INJECTION INTRAMUSCULAR; INTRAVENOUS ONCE AS NEEDED
Status: DISCONTINUED | OUTPATIENT
Start: 2022-02-05 | End: 2022-02-05

## 2022-02-05 RX ORDER — METOCLOPRAMIDE 10 MG/1
10 TABLET ORAL ONCE
Status: COMPLETED | OUTPATIENT
Start: 2022-02-05 | End: 2022-02-05

## 2022-02-05 RX ORDER — ACETAMINOPHEN 500 MG
1000 TABLET ORAL ONCE
Status: COMPLETED | OUTPATIENT
Start: 2022-02-05 | End: 2022-02-05

## 2022-02-05 RX ORDER — MORPHINE SULFATE 4 MG/ML
2 INJECTION, SOLUTION INTRAMUSCULAR; INTRAVENOUS EVERY 10 MIN PRN
Status: DISCONTINUED | OUTPATIENT
Start: 2022-02-05 | End: 2022-02-05

## 2022-02-05 RX ORDER — SODIUM CHLORIDE 9 MG/ML
INJECTION, SOLUTION INTRAVENOUS CONTINUOUS
Status: DISCONTINUED | OUTPATIENT
Start: 2022-02-05 | End: 2022-02-05

## 2022-02-05 RX ORDER — NICOTINE POLACRILEX 4 MG
30 LOZENGE BUCCAL
Status: DISCONTINUED | OUTPATIENT
Start: 2022-02-05 | End: 2022-02-05

## 2022-02-05 RX ORDER — HYDROMORPHONE HYDROCHLORIDE 1 MG/ML
0.6 INJECTION, SOLUTION INTRAMUSCULAR; INTRAVENOUS; SUBCUTANEOUS EVERY 5 MIN PRN
Status: DISCONTINUED | OUTPATIENT
Start: 2022-02-05 | End: 2022-02-05

## 2022-02-05 RX ORDER — PHENYLEPHRINE HCL 10 MG/ML
VIAL (ML) INJECTION AS NEEDED
Status: DISCONTINUED | OUTPATIENT
Start: 2022-02-05 | End: 2022-02-05 | Stop reason: SURG

## 2022-02-05 RX ORDER — HYDROMORPHONE HYDROCHLORIDE 1 MG/ML
0.2 INJECTION, SOLUTION INTRAMUSCULAR; INTRAVENOUS; SUBCUTANEOUS EVERY 5 MIN PRN
Status: DISCONTINUED | OUTPATIENT
Start: 2022-02-05 | End: 2022-02-05

## 2022-02-05 RX ORDER — HYDROMORPHONE HYDROCHLORIDE 1 MG/ML
0.4 INJECTION, SOLUTION INTRAMUSCULAR; INTRAVENOUS; SUBCUTANEOUS EVERY 5 MIN PRN
Status: DISCONTINUED | OUTPATIENT
Start: 2022-02-05 | End: 2022-02-05

## 2022-02-05 RX ORDER — IPRATROPIUM BROMIDE AND ALBUTEROL SULFATE 2.5; .5 MG/3ML; MG/3ML
3 SOLUTION RESPIRATORY (INHALATION) ONCE
Status: COMPLETED | OUTPATIENT
Start: 2022-02-05 | End: 2022-02-05

## 2022-02-05 RX ADMIN — ROCURONIUM BROMIDE 50 MG: 10 INJECTION, SOLUTION INTRAVENOUS at 10:44:00

## 2022-02-05 RX ADMIN — PHENYLEPHRINE HCL 100 MCG: 10 MG/ML VIAL (ML) INJECTION at 11:48:00

## 2022-02-05 RX ADMIN — PHENYLEPHRINE HCL 100 MCG: 10 MG/ML VIAL (ML) INJECTION at 11:17:00

## 2022-02-05 RX ADMIN — SODIUM CHLORIDE: 9 INJECTION, SOLUTION INTRAVENOUS at 10:44:00

## 2022-02-05 RX ADMIN — SODIUM CHLORIDE: 9 INJECTION, SOLUTION INTRAVENOUS at 12:17:00

## 2022-02-05 RX ADMIN — LIDOCAINE HYDROCHLORIDE 50 MG: 10 INJECTION, SOLUTION EPIDURAL; INFILTRATION; INTRACAUDAL; PERINEURAL at 10:44:00

## 2022-02-05 RX ADMIN — ONDANSETRON 4 MG: 2 INJECTION INTRAMUSCULAR; INTRAVENOUS at 10:44:00

## 2022-02-05 RX ADMIN — HEPARIN SODIUM 3000 UNITS: 1000 INJECTION, SOLUTION INTRAVENOUS; SUBCUTANEOUS at 11:20:00

## 2022-02-05 NOTE — ANESTHESIA POSTPROCEDURE EVALUATION
Patient: Kirstie Dakin    Procedure Summary     Date: 02/05/22 Room / Location: [facility] MAIN OR 04 / [facility] MAIN OR    Anesthesia Start: 1032 Anesthesia Stop:     Procedures:       REVISION AV FISTULA  SUPERFICILIZATION LEFT FOREARM AV FISTULA (Left )      ARTERIOVENOUS FISTULA (Left ) Diagnosis: (Stenosis left arm arteriovenous fistula, end stage renal disease, dialysis dependent)    Surgeons: Nidhi Suh MD Anesthesiologist: Khoa Borja MD    Anesthesia Type: general ASA Status: 3          Anesthesia Type: general    PACU Vitals    BP   164/   Temp      Pulse 105 (02/05/22 1234)   Resp 23 (02/05/22 1234)    SpO2 91 % (02/05/22 1234)        [facility] AN Post Evaluation:   Patient Evaluated in PACU  Patient Participation: complete - patient participated  Level of Consciousness: awake  Pain Score: 0  Pain Management: adequate  Airway Patency:patent  Dental exam unchanged from preop  Yes    Cardiovascular Status: acceptable  Respiratory Status: acceptable  Postoperative Hydration acceptable      Rich Jackson MD  2/5/2022 12:35 PM

## 2022-02-05 NOTE — BRIEF OP NOTE
[facility] POST ANESTHESIA CARE UNIT  Brief Op Note       Patients Name: Jimmie Rajput  Attending Physician: Massiel Augustin MD  Operating Physician: Aramis Barfield MD  CSN: [id number]     Location:  OR  MRN: [id number]    YOB: 1955  Admission Date: 2/5/2022  Operation Date: 2/5/2022    Brief Operative Report    Pre-Operative Diagnosis: Stenosis left arm arteriovenous fistula, end stage renal disease, dialysis dependent    Post-Operative Diagnosis: same  Procedure Performed: 1) REVISION left forearm AV FISTULA  2) SUPERFICIALIZATION LEFT FOREARM AV FISTULA      Primary Surgeon:  Aramis Barfield MD    Assistants:  Javi Armstrong SA;  Keisha Martinez MD PGY4  Anesthesia: General  Anesthesiologist [facility]: Rhoda Dobson MD    Findings: left forearm avf serpentine and kinked on self and deep- superficialized and kink removed and flow improved  EBL: 120 mL    Complications: None    Specimens:  none  Condition: good    Aramis Barfield MD  2/5/2022  12:39 PM

## 2022-02-05 NOTE — DISCHARGE SUMMARY
Keep ACE bandage on at least 3 days. May remove after 3 days. May loosen ACE bandage if the hand or foot swells. Keep plastic water proof Tegaderm in place. May apply ice to operative site for pain control. May shower if ACE bandage is covered with a waterproof plastic bag. Otherwise sponge bath. Resume  Regular diet. Call surgeon if pain becomes severe or unable to use extremity. Can use av fistula for dialysis. Danial Simmons MD   [facility] [address]  [id number]  [facility]  [phone]    Please call my office for a follow-up appointment and wound check. You should be seen in the office within one week.

## 2022-02-05 NOTE — ANESTHESIA PROCEDURE NOTES
Airway  Date/Time: 2/5/2022 10:45 AM  Urgency: Elective    Airway not difficult    General Information and Staff    Patient location during procedure: OR  Anesthesiologist: Demian Zurita MD  Performed: anesthesiologist     Indications and Patient Condition  Indications for airway management: anesthesia  Sedation level: deep  Preoxygenated: yes  Patient position: sniffing  Mask difficulty assessment: 1 - vent by mask    Final Airway Details  Final airway type: endotracheal airway      Successful airway: ETT  Cuffed: yes   Successful intubation technique: Video laryngoscopy  Endotracheal tube insertion site: oral  Blade size: #3  ETT size (mm): 7.0    Cormack-Lehane Classification: grade I - full view of glottis  Placement verified by: chest auscultation and capnometry   Measured from: teeth  ETT to teeth (cm): 20  Number of attempts at approach: 1    Additional Comments  Good visualization with glide scope

## 2022-02-07 NOTE — OPERATIVE REPORT
[facility]    PATIENT'S NAME: Adan Kaminski   ATTENDING PHYSICIAN: Terrell Ashton MD   OPERATING PHYSICIAN: Terrell Ashton MD   PATIENT ACCOUNT#:   [de-identified]    LOCATION:  [place] [address]  MEDICAL RECORD #:   [id number]       YOB: 1955  ADMISSION DATE:       02/05/2022      OPERATION DATE:  02/05/2022    OPERATIVE REPORT    PREOPERATIVE DIAGNOSIS:    1. Stenosed left arm arteriovenous fistula. 2.   End-stage renal disease, dialysis dependent. POSTOPERATIVE DIAGNOSIS:    1. Stenosed left arm arteriovenous fistula. 2.   End-stage renal disease, dialysis dependent. 3.      Inacesssible arteriovenous fistula and kinking of arteriovenous fistula. PROCEDURE:    1. Revision of left forearm arteriovenous fistula. 2.   Superficialization of left arm fistula with repair. ASSISTANTS:    1. Priscilla Wong, . 2. Judi Wilkes, MAGEN. ANESTHESIA:  General.    INDICATIONS:  Patient is a 79-year-old woman who has poor flows in her AV fistula. I examined her in the office, and it looked like she had a kinking of her AV fistula. She is a large woman, morbid obesity, has a forearm AV fistula with arteriovenous access points. There was pulsatile flow in the distal forearm access arterial point, but the venous point further up the arm was dilated, but it had very minimal flow in it. On ultrasound it looked like there was kinking in the mid forearm. The plan was to bring the patient to the operating room and possibly revise this with patch and superficialize  the part of the vein that was deep, so there were more access points. The risks of the procedure including bleeding, infection, scarring, need for further operations were discussed with the patient, and she chose to proceed. OPERATIVE TECHNIQUE:  Patient identified in the preoperative area and marked, brought to the operating room and placed in supine position.   General anesthesia was given.  The patient's entire left upper extremity was prepped and draped using sterile towels. A time out was done. Patient received 3 g of Ancef for preoperative antibiotic. I made an incision in the midaspect of the arm between the arteriovenous access point and identified the AV fistula. It dove deep and was underneath fascia. It appeared that the fistula had dilated. The fascia led to kinking of the AV fistula. I freed up the AV fistula, suture ligated a number of side branches with Prolene, so that the fistula could be mobilized. I removed a significant amount of subcutaneous fat, so that the fistula could assume a normal serpentine shape as opposed to being kinked and tacked underneath the deep fascia. It was difficult to initially determine where the venous access was coming from, but it turns out that the AV fistula was a wrist AV fistula. Initially, the fistula was an S-shaped configuration. The lower part of the fistula then deviated to the lateral distal forearm. Then, the vein came back to the level of the mid forearm towards medial mid forearm and then swung across again to the lateral forearm where the venous access point was in the proximal forearm, and that is how the feeder to the venous access site was obtained in the lateral proximal forearm. Once we released the bands of the fascia, we removed the fascia, removing a large amount of subcutaneous fat so we could create a pocket for the fistula to be immediately underneath the skin, the fistula at the arterial aspect that had been pulsatile was now very, very soft. There was brisk flow. There was one point where the superficial branch feeding the venous access point was entered and that was repaired with 6-0 Prolene suture, and the flow was markedly increased and unremarkable, so that was able to be repaired. We then got good hemostasis. Anesthesia gave 3000 units of heparin during the case. We got good hemostasis.   I closed the wounds in layers. Using a cannula, I injected 30 mL of 25% Marcaine, followed by Dermabond, 4 x 4, Tegaderm, Kerlix and Ace bandage. Sponge, needle, and instrument counts were correct. Estimated blood loss about 100 mL. No complications. Patient tolerated the procedure well. No specimens. Patient was extubated and brought to the recovery room in stable condition with good thrill in fistula and good blood flow to the hand.     Dictated By Gonzalez Loaiza MD  d: 02/05/2022 12:46:09  t: 02/05/2022 14:11:40  [facility] [id number]  FJS/

## 2022-02-09 LAB — BLOOD TYPE BARCODE: 5100

## 2022-02-28 ENCOUNTER — TELEPHONE (OUTPATIENT)
Dept: INTERNAL MEDICINE CLINIC | Facility: CLINIC | Age: 67
End: 2022-02-28

## 2022-02-28 NOTE — TELEPHONE ENCOUNTER
Would recommend that we increase patient's current Basaglar insulin from 35 units up to 40 units  Continue to monitor Accu-Cheks and send results again in 1 week. Certainly, any change in medication or clinical condition can at times cause changes in blood sugar readings.

## 2022-02-28 NOTE — TELEPHONE ENCOUNTER
Please advise - called patient who states her BS has been high the past couple days from 250-290. Before they were 110 -115. Her cancer is out of remission and she will be starting new medications tomorrow ( but not sure what treatment)- to DR. SAMSON

## 2022-02-28 NOTE — TELEPHONE ENCOUNTER
Patient is calling today to speak with a nurse. Patient states her sugar has been \"exteremly high\" the last four days. Patient states it has been between 250 and 2290. Patient also informed  that her cancer is out of remission and she will be put on different medication tomorrow. Patient is wondering if this could be the cause of her sugar being so high.       # [phone]

## 2022-03-02 ENCOUNTER — TELEPHONE (OUTPATIENT)
Dept: INTERNAL MEDICINE CLINIC | Facility: CLINIC | Age: 67
End: 2022-03-02

## 2022-03-02 RX ORDER — MONTELUKAST SODIUM 10 MG/1
TABLET ORAL
Qty: 90 TABLET | Refills: 3 | Status: SHIPPED | OUTPATIENT
Start: 2022-03-02

## 2022-03-02 RX ORDER — PEN NEEDLE, DIABETIC 32GX 5/32"
NEEDLE, DISPOSABLE MISCELLANEOUS
Qty: 100 EACH | Refills: 3 | Status: SHIPPED | OUTPATIENT
Start: 2022-03-02

## 2022-03-02 NOTE — TELEPHONE ENCOUNTER
Patient is requesting a refill for Pen Needles (green)     She said the last one prescribed 29g X12mm (red) is too long & thick     She wants a smaller needle preferably (green) she doesn't have the size    [facility]

## 2022-04-25 ENCOUNTER — OFFICE VISIT (OUTPATIENT)
Dept: INTERNAL MEDICINE CLINIC | Facility: CLINIC | Age: 67
End: 2022-04-25
Payer: MEDICARE

## 2022-04-25 VITALS
HEART RATE: 82 BPM | WEIGHT: 293 LBS | OXYGEN SATURATION: 94 % | DIASTOLIC BLOOD PRESSURE: 60 MMHG | TEMPERATURE: 98 F | HEIGHT: 67 IN | BODY MASS INDEX: 45.99 KG/M2 | SYSTOLIC BLOOD PRESSURE: 110 MMHG

## 2022-04-25 DIAGNOSIS — Z79.4 TYPE 2 DIABETES MELLITUS WITH CHRONIC KIDNEY DISEASE ON CHRONIC DIALYSIS, WITH LONG-TERM CURRENT USE OF INSULIN (HCC): ICD-10-CM

## 2022-04-25 DIAGNOSIS — E11.22 TYPE 2 DIABETES MELLITUS WITH CHRONIC KIDNEY DISEASE ON CHRONIC DIALYSIS, WITH LONG-TERM CURRENT USE OF INSULIN (HCC): ICD-10-CM

## 2022-04-25 DIAGNOSIS — Z79.4 TYPE 2 DIABETES MELLITUS WITH HYPERGLYCEMIA, WITH LONG-TERM CURRENT USE OF INSULIN (HCC): Primary | ICD-10-CM

## 2022-04-25 DIAGNOSIS — R06.00 DYSPNEA, UNSPECIFIED TYPE: ICD-10-CM

## 2022-04-25 DIAGNOSIS — E11.65 TYPE 2 DIABETES MELLITUS WITH HYPERGLYCEMIA, WITH LONG-TERM CURRENT USE OF INSULIN (HCC): Primary | ICD-10-CM

## 2022-04-25 DIAGNOSIS — D69.6 THROMBOCYTOPENIA (HCC): ICD-10-CM

## 2022-04-25 DIAGNOSIS — N18.6 TYPE 2 DIABETES MELLITUS WITH CHRONIC KIDNEY DISEASE ON CHRONIC DIALYSIS, WITH LONG-TERM CURRENT USE OF INSULIN (HCC): ICD-10-CM

## 2022-04-25 DIAGNOSIS — C90.02 MULTIPLE MYELOMA IN RELAPSE (HCC): ICD-10-CM

## 2022-04-25 DIAGNOSIS — I89.0 LYMPHEDEMA: ICD-10-CM

## 2022-04-25 DIAGNOSIS — R05.9 COUGH: ICD-10-CM

## 2022-04-25 DIAGNOSIS — R06.81 APNEA: ICD-10-CM

## 2022-04-25 DIAGNOSIS — Z79.4 TYPE 2 DIABETES MELLITUS WITH DIABETIC NEUROPATHY, WITH LONG-TERM CURRENT USE OF INSULIN (HCC): ICD-10-CM

## 2022-04-25 DIAGNOSIS — D61.9 ANEMIA DUE TO BONE MARROW FAILURE, UNSPECIFIED BONE MARROW FAILURE TYPE (HCC): ICD-10-CM

## 2022-04-25 DIAGNOSIS — E11.40 TYPE 2 DIABETES MELLITUS WITH DIABETIC NEUROPATHY, WITH LONG-TERM CURRENT USE OF INSULIN (HCC): ICD-10-CM

## 2022-04-25 DIAGNOSIS — Z99.2 TYPE 2 DIABETES MELLITUS WITH CHRONIC KIDNEY DISEASE ON CHRONIC DIALYSIS, WITH LONG-TERM CURRENT USE OF INSULIN (HCC): ICD-10-CM

## 2022-04-25 DIAGNOSIS — I10 PRIMARY HYPERTENSION: ICD-10-CM

## 2022-04-25 LAB
CARTRIDGE LOT#: ABNORMAL NUMERIC
HEMOGLOBIN A1C: 9.7 % (ref 4.3–5.6)

## 2022-04-25 PROCEDURE — 83036 HEMOGLOBIN GLYCOSYLATED A1C: CPT | Performed by: INTERNAL MEDICINE

## 2022-04-25 PROCEDURE — 99214 OFFICE O/P EST MOD 30 MIN: CPT | Performed by: INTERNAL MEDICINE

## 2022-04-25 RX ORDER — INSULIN LISPRO 100 [IU]/ML
10 INJECTION, SOLUTION INTRAVENOUS; SUBCUTANEOUS
Qty: 6 EACH | Refills: 3 | Status: SHIPPED | OUTPATIENT
Start: 2022-04-25

## 2022-04-28 ENCOUNTER — MED REC SCAN ONLY (OUTPATIENT)
Dept: INTERNAL MEDICINE CLINIC | Facility: CLINIC | Age: 67
End: 2022-04-28

## 2022-05-18 NOTE — TELEPHONE ENCOUNTER
To Elma Escalante - not on refill protocol. [de-identified] : 54 y/o F presents for post op f/u\par s/p right tympanomastoidectomy 3/22/22. Reports right otalgia and sensation of air in right ear when sleeping, reports tongue numbness and mild taste disturbance. Patient denies otorrhea, ear infections, tinnitus, vertigo, headaches related to hearing. Reports mild dizzy sensations when standing to cook.

## 2022-07-21 ENCOUNTER — TELEPHONE (OUTPATIENT)
Dept: INTERNAL MEDICINE CLINIC | Facility: CLINIC | Age: 67
End: 2022-07-21

## 2022-07-21 NOTE — TELEPHONE ENCOUNTER
Duc Mathews from [facility].faxed over request for documentation to be added as an addendum for a heavy duty power scooter for the patient.       Paperwork placed in Dr. Sima Grullon

## 2022-07-22 ENCOUNTER — TELEPHONE (OUTPATIENT)
Dept: INTERNAL MEDICINE CLINIC | Facility: CLINIC | Age: 67
End: 2022-07-22

## 2022-07-22 NOTE — TELEPHONE ENCOUNTER
Spoke with patient who reports she is \"feeling kind of lost\", \"not feeling myself\". She states \"I can't catch up with time\", \"I can't catch up with days\". She has both dialysis and chemotherapy throughout the week and won't know what day it is. She feels fatigued and weak. She states there are times when she does not know where she is. This is new for her. She tells me she is not eating and the nutritionist pulled her aside recently to ask her about this. She denies any swelling, shortness of breath or difficulty breathing. 2 weeks ago she was having really bad headaches and was told by a doctor that \"this is not normal\". She tells me she has an upcoming MRI scheduled. She went to see an ENT and was prescribed Claritin and a nasal spray. She denies any falls. Patient does sound not herself on the phone and like she may be confused. Discussed needing to go to the ER due to newer confusion and symptoms. Explained she will need imaging to rule out there isn't anything concerning causing her confusion, as well as blood work to look for electrolyte imbalances. Patient states that she has no one to drive her today as her daughter and son are out of town. She does not have a neighbor she can ask. I offered to call an ambulance but she declines. She states she is going to wait and see how she feels tomorrow. She states she will go if her symptoms worsen.

## 2022-07-22 NOTE — TELEPHONE ENCOUNTER
Order from April 2022 refaxed to [facility] at [phone]- confirmation recieved

## 2022-07-22 NOTE — TELEPHONE ENCOUNTER
Discussed concerns of possible change in baseline with Dr. Alfredo Crawford. Dr. Alfredo Crawford is agreement that patient goes to the ER. Spoke with patient again. She is feeling a little better. Her blood sugar was in the 90s and she felt a little better after eating something. Discussed symptoms of hypoglycemia that patient is to watch out for. She states her son wants her to go to the hospital but he is going to [facility] and she doesn't want to ruin his trip. Again discussed concerns with change in mental baseline and her symptoms. Reiterated it is Dr. Yousif Huang recommendation to go to the ER. Patient verbalizes understanding but states she prefers to see how things go at this time. She knows that there is always a doctor on call over the weekend and she has an appointment for Monday.

## 2022-07-23 ENCOUNTER — APPOINTMENT (OUTPATIENT)
Dept: GENERAL RADIOLOGY | Facility: HOSPITAL | Age: 67
End: 2022-07-23
Attending: EMERGENCY MEDICINE
Payer: MEDICARE

## 2022-07-23 ENCOUNTER — HOSPITAL ENCOUNTER (INPATIENT)
Facility: HOSPITAL | Age: 67
LOS: 6 days | Discharge: HOME | End: 2022-07-29
Attending: EMERGENCY MEDICINE | Admitting: INTERNAL MEDICINE
Payer: MEDICARE

## 2022-07-23 PROBLEM — J18.9 PNEUMONIA DUE TO INFECTIOUS ORGANISM, UNSPECIFIED LATERALITY, UNSPECIFIED PART OF LUNG: Status: ACTIVE | Noted: 2022-01-01

## 2022-07-23 PROBLEM — J18.9 PNEUMONIA DUE TO INFECTIOUS ORGANISM: Status: ACTIVE | Noted: 2022-01-01

## 2022-07-23 PROBLEM — J18.9 PNEUMONIA DUE TO INFECTIOUS ORGANISM: Status: ACTIVE | Noted: 2022-07-23

## 2022-07-23 PROBLEM — R09.02 HYPOXIA: Status: ACTIVE | Noted: 2022-01-01

## 2022-07-23 PROBLEM — A41.9 SEPSIS (HCC): Status: ACTIVE | Noted: 2022-07-23

## 2022-07-23 PROBLEM — A41.9 SEPSIS (HCC): Status: ACTIVE | Noted: 2022-01-01

## 2022-07-23 PROBLEM — J18.9 PNEUMONIA DUE TO INFECTIOUS ORGANISM, UNSPECIFIED LATERALITY, UNSPECIFIED PART OF LUNG: Status: ACTIVE | Noted: 2022-07-23

## 2022-07-23 PROBLEM — R09.02 HYPOXIA: Status: ACTIVE | Noted: 2022-07-23

## 2022-07-23 NOTE — ED INITIAL ASSESSMENT (HPI)
patient is on dialysis, cancer patient, per family she has been weak, tired, and confused over the last 2 weeks. +cough for months, denies fever.  86% on RA, placed on 2L of oxygen

## 2022-07-23 NOTE — ED QUICK NOTES
Orders for admission, patient is aware of plan and ready to go upstairs. Any questions, please call ED RN Jason Cavazos  at extension [phone].      Type of COVID test sent: Rapid  COVID Suspicion level: Low    Titratable drug(s) infusing: NA  Rate:    LOC at time of transport: A and O x 2-3    Other pertinent information    CIWA score=NA  NIH score=NA

## 2022-07-23 NOTE — ED QUICK NOTES
Attempted to draw blood from port. Able to flush port; however, no blood return after repositioning patient and having the patient cough multiple times. Heparin attemempted with no blood return. 22 gauge IV started on right AC.

## 2022-07-24 ENCOUNTER — APPOINTMENT (OUTPATIENT)
Dept: CT IMAGING | Facility: HOSPITAL | Age: 67
End: 2022-07-24
Attending: INTERNAL MEDICINE
Payer: MEDICARE

## 2022-07-24 NOTE — PLAN OF CARE
Received new pt in stable condition from ED, alert & oriented x 2-3. VSS, on 3L O2, baseline for pt is RA at home. Pt started on IV Zosyn Q 8 hrs. Safety measures in place, frequent rounding on pt. Fall precautions in place. No acute changes. No pain voiced from pt. Currently, on carb controlled diet. Med Rec completed, MD made aware. Pt completed IV Bolus of 0.9 NS fluids, total 1848 ml. Continuous telemetry & pulse oximetry monitoring. PT/OT on order to evaluate pt. Call light placed within reach. Will continue to monitor for any acute changes. Problem: Patient Centered Care  Goal: Patient preferences are identified and integrated in the patient's plan of care  Description: Interventions:  - What would you like us to know as we care for you? I been living at home alone for last 3 months, I use to live with my daughter. I came in with increased weakness, fatigue, and more confusion the last 2 weeks.    - Provide timely, complete, and accurate information to patient/family  - Incorporate patient and family knowledge, values, beliefs, and cultural backgrounds into the planning and delivery of care  - Encourage patient/family to participate in care and decision-making at the level they choose  - Honor patient and family perspectives and choices  Outcome: Progressing     Problem: Diabetes/Glucose Control  Goal: Glucose maintained within prescribed range  Description: INTERVENTIONS:  - Monitor Blood Glucose as ordered  - Assess for signs and symptoms of hyperglycemia and hypoglycemia  - Administer ordered medications to maintain glucose within target range  - Assess barriers to adequate nutritional intake and initiate nutrition consult as needed  - Instruct patient on self management of diabetes  Outcome: Progressing     Problem: Patient/Family Goals  Goal: Patient/Family Long Term Goal  Description: Patient's Long Term Goal: To feel better and go back home    Interventions:  -Nephrology Consult  -Receive Hemodialysis treatments as scheduled  -Administer IV fluids & IV Antibiotics  -Administer scheduled and PRN medications  -Monitor vital signs routinely  -PT/OT orders for evaluation  - See additional Care Plan goals for specific interventions  Outcome: Not Progressing  Goal: Patient/Family Short Term Goal  Description: Patient's Short Term Goal: For confusion to resolve, gain more strength    Interventions:   - Nephrology Consult  -Receive Hemodialysis treatments as scheduled  -Administer IV fluids & IV Antibiotics  -Administer scheduled and PRN medications  -Monitor vital signs routinely  -PT/OT orders for evaluation    - See additional Care Plan goals for specific interventions  Outcome: Progressing     Problem: PAIN - ADULT  Goal: Verbalizes/displays adequate comfort level or patient's stated pain goal  Description: INTERVENTIONS:  - Encourage pt to monitor pain and request assistance  - Assess pain using appropriate pain scale  - Administer analgesics based on type and severity of pain and evaluate response  - Implement non-pharmacological measures as appropriate and evaluate response  - Consider cultural and social influences on pain and pain management  - Manage/alleviate anxiety  - Utilize distraction and/or relaxation techniques  - Monitor for opioid side effects  - Notify MD/LIP if interventions unsuccessful or patient reports new pain  - Anticipate increased pain with activity and pre-medicate as appropriate  Outcome: Progressing     Problem: SAFETY ADULT - FALL  Goal: Free from fall injury  Description: INTERVENTIONS:  - Assess pt frequently for physical needs  - Identify cognitive and physical deficits and behaviors that affect risk of falls.   - [place] fall precautions as indicated by assessment.  - Educate pt/family on patient safety including physical limitations  - Instruct pt to call for assistance with activity based on assessment  - Modify environment to reduce risk of injury  - Provide assistive devices as appropriate  - Consider OT/PT consult to assist with strengthening/mobility  - Encourage toileting schedule  Outcome: Progressing     Problem: CARDIOVASCULAR - ADULT  Goal: Maintains optimal cardiac output and hemodynamic stability  Description: INTERVENTIONS:  - Monitor vital signs, rhythm, and trends  - Monitor for bleeding, hypotension and signs of decreased cardiac output  - Evaluate effectiveness of vasoactive medications to optimize hemodynamic stability  - Monitor arterial and/or venous puncture sites for bleeding and/or hematoma  - Assess quality of pulses, skin color and temperature  - Assess for signs of decreased coronary artery perfusion - ex.  Angina  - Evaluate fluid balance, assess for edema, trend weights  Outcome: Progressing  Goal: Absence of cardiac arrhythmias or at baseline  Description: INTERVENTIONS:  - Continuous cardiac monitoring, monitor vital signs, obtain 12 lead EKG if indicated  - Evaluate effectiveness of antiarrhythmic and heart rate control medications as ordered  - Initiate emergency measures for life threatening arrhythmias  - Monitor electrolytes and administer replacement therapy as ordered  Outcome: Progressing     Problem: RESPIRATORY - ADULT  Goal: Achieves optimal ventilation and oxygenation  Description: INTERVENTIONS:  - Assess for changes in respiratory status  - Assess for changes in mentation and behavior  - Position to facilitate oxygenation and minimize respiratory effort  - Oxygen supplementation based on oxygen saturation or ABGs  - Provide Smoking Cessation handout, if applicable  - Encourage broncho-pulmonary hygiene including cough, deep breathe, Incentive Spirometry  - Assess the need for suctioning and perform as needed  - Assess and instruct to report SOB or any respiratory difficulty  - Respiratory Therapy support as indicated  - Manage/alleviate anxiety  - Monitor for signs/symptoms of CO2 retention  Outcome: Progressing     Problem: METABOLIC/FLUID AND ELECTROLYTES - ADULT  Goal: Glucose maintained within prescribed range  Description: INTERVENTIONS:  - Monitor Blood Glucose as ordered  - Assess for signs and symptoms of hyperglycemia and hypoglycemia  - Administer ordered medications to maintain glucose within target range  - Assess barriers to adequate nutritional intake and initiate nutrition consult as needed  - Instruct patient on self management of diabetes  Outcome: Progressing  Goal: Electrolytes maintained within normal limits  Description: INTERVENTIONS:  - Monitor labs and rhythm and assess patient for signs and symptoms of electrolyte imbalances  - Administer electrolyte replacement as ordered  - Monitor response to electrolyte replacements, including rhythm and repeat lab results as appropriate  - Fluid restriction as ordered  - Instruct patient on fluid and nutrition restrictions as appropriate  Outcome: Progressing  Goal: Hemodynamic stability and optimal renal function maintained  Description: INTERVENTIONS:  - Monitor labs and assess for signs and symptoms of volume excess or deficit  - Monitor intake, output and patient weight  - Monitor urine specific gravity, serum osmolarity and serum sodium as indicated or ordered  - Monitor response to interventions for patient's volume status, including labs, urine output, blood pressure (other measures as available)  - Encourage oral intake as appropriate  - Instruct patient on fluid and nutrition restrictions as appropriate  Outcome: Progressing     Problem: SKIN/TISSUE INTEGRITY - ADULT  Goal: Skin integrity remains intact  Description: INTERVENTIONS  - Assess and document risk factors for pressure ulcer development  - Assess and document skin integrity  - Monitor for areas of redness and/or skin breakdown  - Initiate interventions, skin care algorithm/standards of care as needed  Outcome: Progressing  Goal: Oral mucous membranes remain intact  Description: INTERVENTIONS  - Assess oral mucosa and hygiene practices  - Implement preventative oral hygiene regimen  - Implement oral medicated treatments as ordered  Outcome: Progressing     Problem: NEUROLOGICAL - ADULT  Goal: Achieves stable or improved neurological status  Description: INTERVENTIONS  - Assess for and report changes in neurological status  - Initiate measures to prevent increased intracranial pressure  - Maintain blood pressure and fluid volume within ordered parameters to optimize cerebral perfusion and minimize risk of hemorrhage  - Monitor temperature, glucose, and sodium.  Initiate appropriate interventions as ordered  Outcome: Progressing  Goal: Achieves maximal functionality and self care  Description: INTERVENTIONS  - Monitor swallowing and airway patency with patient fatigue and changes in neurological status  - Encourage and assist patient to increase activity and self care with guidance from PT/OT  - Encourage visually impaired, hearing impaired and aphasic patients to use assistive/communication devices  Outcome: Progressing     Problem: Impaired Cognition  Goal: Patient will exhibit improved attention, thought processing and/or memory  Description: Interventions:    Outcome: Progressing

## 2022-07-24 NOTE — PROGRESS NOTES
[facility] Pharmacy Note:  Renal Dose Adjustment for enoxaparin (LOVENOX)    Annalise Grove has been prescribed enoxaparin 40 mg subcutaneously every 24 hours. Estimated Creatinine Clearance: 7.5 mL/min (A) (based on SCr of 7.3 mg/dL (H)). Calculated CrCl less than 20 mL/min so the dose of Enoxaparin (LOVENOX) has been changed to heparin 5000 units every 8 hours per P&T approved protocol. Pharmacy will continue to follow, and make additional adjustments if needed.       Thank you,  Carmelita Jimenez, PharmD  7/24/2022 2:56 PM

## 2022-07-24 NOTE — ED QUICK NOTES
JOEY Rodriges from [address] [place] [address] made aware of sepsis bolus needing to be finished

## 2022-07-24 NOTE — PLAN OF CARE
Problem: Patient Centered Care  Goal: Patient preferences are identified and integrated in the patient's plan of care  Description: Interventions:  - What would you like us to know as we care for you? I been living at home alone for last 3 months, I use to live with my daughter. I came in with increased weakness, fatigue, and more confusion the last 2 weeks.    - Provide timely, complete, and accurate information to patient/family  - Incorporate patient and family knowledge, values, beliefs, and cultural backgrounds into the planning and delivery of care  - Encourage patient/family to participate in care and decision-making at the level they choose  - Honor patient and family perspectives and choices  Outcome: Progressing     Problem: Diabetes/Glucose Control  Goal: Glucose maintained within prescribed range  Description: INTERVENTIONS:  - Monitor Blood Glucose as ordered  - Assess for signs and symptoms of hyperglycemia and hypoglycemia  - Administer ordered medications to maintain glucose within target range  - Assess barriers to adequate nutritional intake and initiate nutrition consult as needed  - Instruct patient on self management of diabetes  Outcome: Progressing     Problem: Patient/Family Goals  Goal: Patient/Family Long Term Goal  Description: Patient's Long Term Goal: To feel better and go back home    Interventions:  -Nephrology Consult  -Receive Hemodialysis treatments as scheduled  -Administer IV fluids & IV Antibiotics  -Administer scheduled and PRN medications  -Monitor vital signs routinely  -PT/OT orders for evaluation  - See additional Care Plan goals for specific interventions  Outcome: Progressing  Goal: Patient/Family Short Term Goal  Description: Patient's Short Term Goal: For confusion to resolve, gain more strength    Interventions:   - Nephrology Consult  -Receive Hemodialysis treatments as scheduled  -Administer IV fluids & IV Antibiotics  -Administer scheduled and PRN medications  -Monitor vital signs routinely  -PT/OT orders for evaluation    - See additional Care Plan goals for specific interventions  Outcome: Progressing     Problem: PAIN - ADULT  Goal: Verbalizes/displays adequate comfort level or patient's stated pain goal  Description: INTERVENTIONS:  - Encourage pt to monitor pain and request assistance  - Assess pain using appropriate pain scale  - Administer analgesics based on type and severity of pain and evaluate response  - Implement non-pharmacological measures as appropriate and evaluate response  - Consider cultural and social influences on pain and pain management  - Manage/alleviate anxiety  - Utilize distraction and/or relaxation techniques  - Monitor for opioid side effects  - Notify MD/LIP if interventions unsuccessful or patient reports new pain  - Anticipate increased pain with activity and pre-medicate as appropriate  Outcome: Progressing     Problem: SAFETY ADULT - FALL  Goal: Free from fall injury  Description: INTERVENTIONS:  - Assess pt frequently for physical needs  - Identify cognitive and physical deficits and behaviors that affect risk of falls.   - [place] fall precautions as indicated by assessment.  - Educate pt/family on patient safety including physical limitations  - Instruct pt to call for assistance with activity based on assessment  - Modify environment to reduce risk of injury  - Provide assistive devices as appropriate  - Consider OT/PT consult to assist with strengthening/mobility  - Encourage toileting schedule  Outcome: Progressing     Problem: CARDIOVASCULAR - ADULT  Goal: Maintains optimal cardiac output and hemodynamic stability  Description: INTERVENTIONS:  - Monitor vital signs, rhythm, and trends  - Monitor for bleeding, hypotension and signs of decreased cardiac output  - Evaluate effectiveness of vasoactive medications to optimize hemodynamic stability  - Monitor arterial and/or venous puncture sites for bleeding and/or hematoma  - Assess quality of pulses, skin color and temperature  - Assess for signs of decreased coronary artery perfusion - ex.  Angina  - Evaluate fluid balance, assess for edema, trend weights  Outcome: Progressing  Goal: Absence of cardiac arrhythmias or at baseline  Description: INTERVENTIONS:  - Continuous cardiac monitoring, monitor vital signs, obtain 12 lead EKG if indicated  - Evaluate effectiveness of antiarrhythmic and heart rate control medications as ordered  - Initiate emergency measures for life threatening arrhythmias  - Monitor electrolytes and administer replacement therapy as ordered  Outcome: Progressing     Problem: RESPIRATORY - ADULT  Goal: Achieves optimal ventilation and oxygenation  Description: INTERVENTIONS:  - Assess for changes in respiratory status  - Assess for changes in mentation and behavior  - Position to facilitate oxygenation and minimize respiratory effort  - Oxygen supplementation based on oxygen saturation or ABGs  - Provide Smoking Cessation handout, if applicable  - Encourage broncho-pulmonary hygiene including cough, deep breathe, Incentive Spirometry  - Assess the need for suctioning and perform as needed  - Assess and instruct to report SOB or any respiratory difficulty  - Respiratory Therapy support as indicated  - Manage/alleviate anxiety  - Monitor for signs/symptoms of CO2 retention  Outcome: Progressing     Problem: METABOLIC/FLUID AND ELECTROLYTES - ADULT  Goal: Glucose maintained within prescribed range  Description: INTERVENTIONS:  - Monitor Blood Glucose as ordered  - Assess for signs and symptoms of hyperglycemia and hypoglycemia  - Administer ordered medications to maintain glucose within target range  - Assess barriers to adequate nutritional intake and initiate nutrition consult as needed  - Instruct patient on self management of diabetes  Outcome: Progressing  Goal: Electrolytes maintained within normal limits  Description: INTERVENTIONS:  - Monitor labs and rhythm and assess patient for signs and symptoms of electrolyte imbalances  - Administer electrolyte replacement as ordered  - Monitor response to electrolyte replacements, including rhythm and repeat lab results as appropriate  - Fluid restriction as ordered  - Instruct patient on fluid and nutrition restrictions as appropriate  Outcome: Progressing  Goal: Hemodynamic stability and optimal renal function maintained  Description: INTERVENTIONS:  - Monitor labs and assess for signs and symptoms of volume excess or deficit  - Monitor intake, output and patient weight  - Monitor urine specific gravity, serum osmolarity and serum sodium as indicated or ordered  - Monitor response to interventions for patient's volume status, including labs, urine output, blood pressure (other measures as available)  - Encourage oral intake as appropriate  - Instruct patient on fluid and nutrition restrictions as appropriate  Outcome: Progressing     Problem: SKIN/TISSUE INTEGRITY - ADULT  Goal: Skin integrity remains intact  Description: INTERVENTIONS  - Assess and document risk factors for pressure ulcer development  - Assess and document skin integrity  - Monitor for areas of redness and/or skin breakdown  - Initiate interventions, skin care algorithm/standards of care as needed  Outcome: Progressing  Goal: Oral mucous membranes remain intact  Description: INTERVENTIONS  - Assess oral mucosa and hygiene practices  - Implement preventative oral hygiene regimen  - Implement oral medicated treatments as ordered  Outcome: Progressing     Problem: NEUROLOGICAL - ADULT  Goal: Achieves stable or improved neurological status  Description: INTERVENTIONS  - Assess for and report changes in neurological status  - Initiate measures to prevent increased intracranial pressure  - Maintain blood pressure and fluid volume within ordered parameters to optimize cerebral perfusion and minimize risk of hemorrhage  - Monitor temperature, glucose, and sodium. Initiate appropriate interventions as ordered  Outcome: Progressing  Goal: Achieves maximal functionality and self care  Description: INTERVENTIONS  - Monitor swallowing and airway patency with patient fatigue and changes in neurological status  - Encourage and assist patient to increase activity and self care with guidance from PT/OT  - Encourage visually impaired, hearing impaired and aphasic patients to use assistive/communication devices  Outcome: Progressing     Problem: Impaired Cognition  Goal: Patient will exhibit improved attention, thought processing and/or memory  Description: Interventions:  {tcome: Progressing 7/24/22  ct chest, blood sugars mid 100's sliding scale & lantus coverage, dvt prophylaxis, o2 3 l spo2 mid 90's  tried to wean o2 mid 88-89 r/a, ambulatory in room, HEMODIALYSIS for 7/25(Mon)  [facility] aware. Covid Negative. Iv abx.

## 2022-07-25 ENCOUNTER — APPOINTMENT (OUTPATIENT)
Dept: GENERAL RADIOLOGY | Facility: HOSPITAL | Age: 67
End: 2022-07-25
Attending: INTERNAL MEDICINE
Payer: MEDICARE

## 2022-07-25 ENCOUNTER — APPOINTMENT (OUTPATIENT)
Dept: CV DIAGNOSTICS | Facility: HOSPITAL | Age: 67
End: 2022-07-25
Attending: INTERNAL MEDICINE
Payer: MEDICARE

## 2022-07-25 NOTE — PLAN OF CARE
Problem: Patient Centered Care  Goal: Patient preferences are identified and integrated in the patient's plan of care  Description: Interventions:  - What would you like us to know as we care for you? I been living at home alone for last 3 months, I use to live with my daughter. I came in with increased weakness, fatigue, and more confusion the last 2 weeks.    - Provide timely, complete, and accurate information to patient/family  - Incorporate patient and family knowledge, values, beliefs, and cultural backgrounds into the planning and delivery of care  - Encourage patient/family to participate in care and decision-making at the level they choose  - Honor patient and family perspectives and choices  Outcome: Progressing     Problem: Diabetes/Glucose Control  Goal: Glucose maintained within prescribed range  Description: INTERVENTIONS:  - Monitor Blood Glucose as ordered  - Assess for signs and symptoms of hyperglycemia and hypoglycemia  - Administer ordered medications to maintain glucose within target range  - Assess barriers to adequate nutritional intake and initiate nutrition consult as needed  - Instruct patient on self management of diabetes  Outcome: Progressing     Problem: Patient/Family Goals  Goal: Patient/Family Long Term Goal  Description: Patient's Long Term Goal: To feel better and go back home    Interventions:  -Nephrology Consult  -Receive Hemodialysis treatments as scheduled  -Administer IV fluids & IV Antibiotics  -Administer scheduled and PRN medications  -Monitor vital signs routinely  -PT/OT orders for evaluation  - See additional Care Plan goals for specific interventions  Outcome: Progressing  Goal: Patient/Family Short Term Goal  Description: Patient's Short Term Goal: For confusion to resolve, gain more strength    Interventions:   - Nephrology Consult  -Receive Hemodialysis treatments as scheduled  -Administer IV fluids & IV Antibiotics  -Administer scheduled and PRN medications  -Monitor vital signs routinely  -PT/OT orders for evaluation    - See additional Care Plan goals for specific interventions  Outcome: Progressing     Problem: PAIN - ADULT  Goal: Verbalizes/displays adequate comfort level or patient's stated pain goal  Description: INTERVENTIONS:  - Encourage pt to monitor pain and request assistance  - Assess pain using appropriate pain scale  - Administer analgesics based on type and severity of pain and evaluate response  - Implement non-pharmacological measures as appropriate and evaluate response  - Consider cultural and social influences on pain and pain management  - Manage/alleviate anxiety  - Utilize distraction and/or relaxation techniques  - Monitor for opioid side effects  - Notify MD/LIP if interventions unsuccessful or patient reports new pain  - Anticipate increased pain with activity and pre-medicate as appropriate  Outcome: Progressing     Problem: SAFETY ADULT - FALL  Goal: Free from fall injury  Description: INTERVENTIONS:  - Assess pt frequently for physical needs  - Identify cognitive and physical deficits and behaviors that affect risk of falls.   - [place] fall precautions as indicated by assessment.  - Educate pt/family on patient safety including physical limitations  - Instruct pt to call for assistance with activity based on assessment  - Modify environment to reduce risk of injury  - Provide assistive devices as appropriate  - Consider OT/PT consult to assist with strengthening/mobility  - Encourage toileting schedule  Outcome: Progressing     Problem: CARDIOVASCULAR - ADULT  Goal: Maintains optimal cardiac output and hemodynamic stability  Description: INTERVENTIONS:  - Monitor vital signs, rhythm, and trends  - Monitor for bleeding, hypotension and signs of decreased cardiac output  - Evaluate effectiveness of vasoactive medications to optimize hemodynamic stability  - Monitor arterial and/or venous puncture sites for bleeding and/or hematoma  - Assess quality of pulses, skin color and temperature  - Assess for signs of decreased coronary artery perfusion - ex.  Angina  - Evaluate fluid balance, assess for edema, trend weights  Outcome: Progressing  Goal: Absence of cardiac arrhythmias or at baseline  Description: INTERVENTIONS:  - Continuous cardiac monitoring, monitor vital signs, obtain 12 lead EKG if indicated  - Evaluate effectiveness of antiarrhythmic and heart rate control medications as ordered  - Initiate emergency measures for life threatening arrhythmias  - Monitor electrolytes and administer replacement therapy as ordered  Outcome: Progressing     Problem: RESPIRATORY - ADULT  Goal: Achieves optimal ventilation and oxygenation  Description: INTERVENTIONS:  - Assess for changes in respiratory status  - Assess for changes in mentation and behavior  - Position to facilitate oxygenation and minimize respiratory effort  - Oxygen supplementation based on oxygen saturation or ABGs  - Provide Smoking Cessation handout, if applicable  - Encourage broncho-pulmonary hygiene including cough, deep breathe, Incentive Spirometry  - Assess the need for suctioning and perform as needed  - Assess and instruct to report SOB or any respiratory difficulty  - Respiratory Therapy support as indicated  - Manage/alleviate anxiety  - Monitor for signs/symptoms of CO2 retention  Outcome: Progressing     Problem: METABOLIC/FLUID AND ELECTROLYTES - ADULT  Goal: Glucose maintained within prescribed range  Description: INTERVENTIONS:  - Monitor Blood Glucose as ordered  - Assess for signs and symptoms of hyperglycemia and hypoglycemia  - Administer ordered medications to maintain glucose within target range  - Assess barriers to adequate nutritional intake and initiate nutrition consult as needed  - Instruct patient on self management of diabetes  Outcome: Progressing  Goal: Electrolytes maintained within normal limits  Description: INTERVENTIONS:  - Monitor Blood Glucose as ordered  - Assess for signs and symptoms of hyperglycemia and hypoglycemia  - Administer ordered medications to maintain glucose within target range  - Assess barriers to adequate nutritional intake and initiate nutrition consult as needed  - Instruct patient on self management of diabetes  Outcome: Progressing  Goal: Hemodynamic stability and optimal renal function maintained  Description: INTERVENTIONS:  - Monitor labs and rhythm and assess patient for signs and symptoms of electrolyte imbalances  - Administer electrolyte replacement as ordered  - Monitor response to electrolyte replacements, including rhythm and repeat lab results as appropriate  - Fluid restriction as ordered  - Instruct patient on fluid and nutrition restrictions as appropriate  Outcome: Progressing     Problem: SKIN/TISSUE INTEGRITY - ADULT  Goal: Skin integrity remains intact  Description: INTERVENTIONS  - Assess and document risk factors for pressure ulcer development  - Assess and document skin integrity  - Monitor for areas of redness and/or skin breakdown  - Initiate interventions, skin care algorithm/standards of care as needed  Outcome: Progressing  Goal: Oral mucous membranes remain intact  Description: INTERVENTIONS  - Assess oral mucosa and hygiene practices  - Implement preventative oral hygiene regimen  - Implement oral medicated treatments as ordered  Outcome: Progressing     Problem: NEUROLOGICAL - ADULT  Goal: Achieves stable or improved neurological status  Description: INTERVENTIONS  - Assess for and report changes in neurological status  - Initiate measures to prevent increased intracranial pressure  - Maintain blood pressure and fluid volume within ordered parameters to optimize cerebral perfusion and minimize risk of hemorrhage  - Monitor temperature, glucose, and sodium.  Initiate appropriate interventions as ordered  Outcome: Progressing  Goal: Achieves maximal functionality and self care  Description: INTERVENTIONS  - Monitor swallowing and airway patency with patient fatigue and changes in neurological status  - Encourage and assist patient to increase activity and self care with guidance from PT/OT  - Encourage visually impaired, hearing impaired and aphasic patients to use assistive/communication devices  Outcome: Progressing     Problem: Impaired Cognition  Goal: Patient will exhibit improved attention, thought processing and/or memory  Description: Interventions:  - Allow additional time for processing after asking questions or providing instructions  Outcome: Progressing     Problem: HEMATOLOGIC - ADULT  Goal: Free from bleeding injury  Description: (Example usage: patient with low platelets)  INTERVENTIONS:  - Avoid intramuscular injections, enemas and rectal medication administration  - Ensure safe mobilization of patient  - Hold pressure on venipuncture sites to achieve adequate hemostasis  - Assess for signs and symptoms of internal bleeding  - Monitor lab trends  - Patient is to report abnormal signs of bleeding to staff  - Avoid use of toothpicks and dental floss  - Use electric shaver for shaving  - Use soft bristle tooth brush  - Limit straining and forceful nose blowing  Outcome: Progressing     No acute changes, plan to have HD in the am.

## 2022-07-25 NOTE — PLAN OF CARE
Patient has orders for Respiratory Flu Expanded Panel + Covid 19 specimen to be obtained. Patient is refusing to have specimen obtained per Dr. Jayme Magana orders. Dr. Jayme Magana will be notified of patient's refusal of specimen. Patient educated on the important benefits of having specimen obtained, but refused.

## 2022-07-25 NOTE — TELEPHONE ENCOUNTER
Patient's daughter Shawn Longo calling to get a condition update for patient.   She called the hospital but was told the nurses are very busy today    Phone [phone]

## 2022-07-25 NOTE — PHYSICAL THERAPY NOTE
PT evaluation orders received and chart reviewed. Patient off floor for dialysis. Will re-attempt this p.m. as schedule permits.      Thank you,  Ramy Barrera, PT, DPT

## 2022-07-25 NOTE — PROGRESS NOTES
[facility] Pharmacy Note:  Renal Dose Adjustment    Cecille Nguyen has been prescribed famotidine (PEPCID) 40 mg orally every 24 hours. Estimated Creatinine Clearance: 6.6 mL/min (A) (based on SCr of 8.26 mg/dL (H)). Calculated creatinine clearance is < 50 ml/min, therefore the dose of famotidine (Pepcid) has been changed to 20 mg orally every 24 hours per P&T approved protocol. Pharmacy will continue to follow, and if renal function improves, will resume the original order.     Thank you,  Andrei Bal, [facility]  7/25/2022 8:50 AM

## 2022-07-25 NOTE — OCCUPATIONAL THERAPY NOTE
Attempted to see pt for initial occupational therapy evaluation, pt had just left floor for HD, will check back in afternoon.      Justin Gusman OTR/L  [facility]   #[id number]

## 2022-07-26 ENCOUNTER — APPOINTMENT (OUTPATIENT)
Dept: CT IMAGING | Facility: HOSPITAL | Age: 67
End: 2022-07-26
Attending: INTERNAL MEDICINE
Payer: MEDICARE

## 2022-07-26 NOTE — PLAN OF CARE
Problem: Patient Centered Care  Goal: Patient preferences are identified and integrated in the patient's plan of care  Description: Interventions:  - What would you like us to know as we care for you? I been living at home alone for last 3 months, I use to live with my daughter. I came in with increased weakness, fatigue, and more confusion the last 2 weeks.    - Provide timely, complete, and accurate information to patient/family  - Incorporate patient and family knowledge, values, beliefs, and cultural backgrounds into the planning and delivery of care  - Encourage patient/family to participate in care and decision-making at the level they choose  - Honor patient and family perspectives and choices  Outcome: Progressing     Problem: Diabetes/Glucose Control  Goal: Glucose maintained within prescribed range  Description: INTERVENTIONS:  - Monitor Blood Glucose as ordered  - Assess for signs and symptoms of hyperglycemia and hypoglycemia  - Administer ordered medications to maintain glucose within target range  - Assess barriers to adequate nutritional intake and initiate nutrition consult as needed  - Instruct patient on self management of diabetes  Outcome: Progressing     Problem: Patient/Family Goals  Goal: Patient/Family Long Term Goal  Description: Patient's Long Term Goal: To feel better and go back home    Interventions:  -Nephrology Consult  -Receive Hemodialysis treatments as scheduled  -Administer IV fluids & IV Antibiotics  -Administer scheduled and PRN medications  -Monitor vital signs routinely  -PT/OT orders for evaluation  - See additional Care Plan goals for specific interventions  Outcome: Progressing  Goal: Patient/Family Short Term Goal  Description: Patient's Short Term Goal: For confusion to resolve, gain more strength    Interventions:   - Nephrology Consult  -Receive Hemodialysis treatments as scheduled  -Administer IV fluids & IV Antibiotics  -Administer scheduled and PRN medications  -Monitor vital signs routinely  -PT/OT orders for evaluation    - See additional Care Plan goals for specific interventions  Outcome: Progressing     Problem: PAIN - ADULT  Goal: Verbalizes/displays adequate comfort level or patient's stated pain goal  Description: INTERVENTIONS:  - Encourage pt to monitor pain and request assistance  - Assess pain using appropriate pain scale  - Administer analgesics based on type and severity of pain and evaluate response  - Implement non-pharmacological measures as appropriate and evaluate response  - Consider cultural and social influences on pain and pain management  - Manage/alleviate anxiety  - Utilize distraction and/or relaxation techniques  - Monitor for opioid side effects  - Notify MD/LIP if interventions unsuccessful or patient reports new pain  - Anticipate increased pain with activity and pre-medicate as appropriate  Outcome: Progressing     Problem: SAFETY ADULT - FALL  Goal: Free from fall injury  Description: INTERVENTIONS:  - Assess pt frequently for physical needs  - Identify cognitive and physical deficits and behaviors that affect risk of falls.   - [place] fall precautions as indicated by assessment.  - Educate pt/family on patient safety including physical limitations  - Instruct pt to call for assistance with activity based on assessment  - Modify environment to reduce risk of injury  - Provide assistive devices as appropriate  - Consider OT/PT consult to assist with strengthening/mobility  - Encourage toileting schedule  Outcome: Progressing     Problem: CARDIOVASCULAR - ADULT  Goal: Maintains optimal cardiac output and hemodynamic stability  Description: INTERVENTIONS:  - Monitor vital signs, rhythm, and trends  - Monitor for bleeding, hypotension and signs of decreased cardiac output  - Evaluate effectiveness of vasoactive medications to optimize hemodynamic stability  - Monitor arterial and/or venous puncture sites for bleeding and/or hematoma  - Assess quality of pulses, skin color and temperature  - Assess for signs of decreased coronary artery perfusion - ex.  Angina  - Evaluate fluid balance, assess for edema, trend weights  Outcome: Progressing  Goal: Absence of cardiac arrhythmias or at baseline  Description: INTERVENTIONS:  - Continuous cardiac monitoring, monitor vital signs, obtain 12 lead EKG if indicated  - Evaluate effectiveness of antiarrhythmic and heart rate control medications as ordered  - Initiate emergency measures for life threatening arrhythmias  - Monitor electrolytes and administer replacement therapy as ordered  Outcome: Progressing     Problem: RESPIRATORY - ADULT  Goal: Achieves optimal ventilation and oxygenation  Description: INTERVENTIONS:  - Assess for changes in respiratory status  - Assess for changes in mentation and behavior  - Position to facilitate oxygenation and minimize respiratory effort  - Oxygen supplementation based on oxygen saturation or ABGs  - Provide Smoking Cessation handout, if applicable  - Encourage broncho-pulmonary hygiene including cough, deep breathe, Incentive Spirometry  - Assess the need for suctioning and perform as needed  - Assess and instruct to report SOB or any respiratory difficulty  - Respiratory Therapy support as indicated  - Manage/alleviate anxiety  - Monitor for signs/symptoms of CO2 retention  Outcome: Progressing     Problem: METABOLIC/FLUID AND ELECTROLYTES - ADULT  Goal: Glucose maintained within prescribed range  Description: INTERVENTIONS:  - Monitor Blood Glucose as ordered  - Assess for signs and symptoms of hyperglycemia and hypoglycemia  - Administer ordered medications to maintain glucose within target range  - Assess barriers to adequate nutritional intake and initiate nutrition consult as needed  - Instruct patient on self management of diabetes  Outcome: Progressing  Goal: Electrolytes maintained within normal limits  Description: INTERVENTIONS:  - Monitor labs and rhythm and assess patient for signs and symptoms of electrolyte imbalances  - Administer electrolyte replacement as ordered  - Monitor response to electrolyte replacements, including rhythm and repeat lab results as appropriate  - Fluid restriction as ordered  - Instruct patient on fluid and nutrition restrictions as appropriate  Outcome: Progressing  Goal: Hemodynamic stability and optimal renal function maintained  Description: INTERVENTIONS:  - Monitor labs and assess for signs and symptoms of volume excess or deficit  - Monitor intake, output and patient weight  - Monitor urine specific gravity, serum osmolarity and serum sodium as indicated or ordered  - Monitor response to interventions for patient's volume status, including labs, urine output, blood pressure (other measures as available)  - Encourage oral intake as appropriate  - Instruct patient on fluid and nutrition restrictions as appropriate  Outcome: Progressing     Patient fluid intake is monitored. Hemodialysis done today, per Hemodialysis Rn patient educated on fluid overload and potassium levels and fluid intake-education reinforced by this RN at the bedside, call light left within reach. 2500 ml of fluid was removed per reported by Hemodialysis RN.     Problem: SKIN/TISSUE INTEGRITY - ADULT  Goal: Skin integrity remains intact  Description: INTERVENTIONS  - Assess and document risk factors for pressure ulcer development  - Assess and document skin integrity  - Monitor for areas of redness and/or skin breakdown  - Initiate interventions, skin care algorithm/standards of care as needed  Outcome: Progressing  Goal: Oral mucous membranes remain intact  Description: INTERVENTIONS  - Assess oral mucosa and hygiene practices  - Implement preventative oral hygiene regimen  - Implement oral medicated treatments as ordered  Outcome: Progressing     Problem: NEUROLOGICAL - ADULT  Goal: Achieves stable or improved neurological status  Description: INTERVENTIONS  - Assess for and report changes in neurological status  - Initiate measures to prevent increased intracranial pressure  - Maintain blood pressure and fluid volume within ordered parameters to optimize cerebral perfusion and minimize risk of hemorrhage  - Monitor temperature, glucose, and sodium. Initiate appropriate interventions as ordered  Outcome: Progressing  Goal: Achieves maximal functionality and self care  Description: INTERVENTIONS  - Monitor swallowing and airway patency with patient fatigue and changes in neurological status  - Encourage and assist patient to increase activity and self care with guidance from PT/OT  - Encourage visually impaired, hearing impaired and aphasic patients to use assistive/communication devices  Outcome: Progressing     Problem: Impaired Cognition  Goal: Patient will exhibit improved attention, thought processing and/or memory  Description: Interventions:  - Allow additional time for processing after asking questions or providing instructions  Outcome: Progressing     Problem: HEMATOLOGIC - ADULT  Goal: Free from bleeding injury  Description: (Example usage: patient with low platelets)  INTERVENTIONS:  - Avoid intramuscular injections, enemas and rectal medication administration  - Ensure safe mobilization of patient  - Hold pressure on venipuncture sites to achieve adequate hemostasis  - Assess for signs and symptoms of internal bleeding  - Monitor lab trends  - Patient is to report abnormal signs of bleeding to staff  - Avoid use of toothpicks and dental floss  - Use electric shaver for shaving  - Use soft bristle tooth brush  - Limit straining and forceful nose blowing  Outcome: Progressing      Patient is presently resting in the bed. Alert x 4. Patient vital signs taken and stable. No complaints of pain or discomfort. Fall risk precautions are followed to ensure safety. Patient has pulmonary consult.  ProBNP lab result was reported to Dr. Roosevelt Guillaume, and orders were entered by doctor. Patient receives intravenous antibiotics per doctor orders. Call light within reach at all times. Patient had Respiratory flu expanded + covid 19 and was negative.

## 2022-07-26 NOTE — PLAN OF CARE
Problem: Patient Centered Care  Goal: Patient preferences are identified and integrated in the patient's plan of care  Description: Interventions:  - What would you like us to know as we care for you? I been living at home alone for last 3 months, I use to live with my daughter. I came in with increased weakness, fatigue, and more confusion the last 2 weeks.    - Provide timely, complete, and accurate information to patient/family  - Incorporate patient and family knowledge, values, beliefs, and cultural backgrounds into the planning and delivery of care  - Encourage patient/family to participate in care and decision-making at the level they choose  - Honor patient and family perspectives and choices  Outcome: Progressing     Problem: Diabetes/Glucose Control  Goal: Glucose maintained within prescribed range  Description: INTERVENTIONS:  - Monitor Blood Glucose as ordered  - Assess for signs and symptoms of hyperglycemia and hypoglycemia  - Administer ordered medications to maintain glucose within target range  - Assess barriers to adequate nutritional intake and initiate nutrition consult as needed  - Instruct patient on self management of diabetes  Outcome: Progressing     Problem: Patient/Family Goals  Goal: Patient/Family Long Term Goal  Description: Patient's Long Term Goal: To feel better and go back home    Interventions:  -Nephrology Consult  -Receive Hemodialysis treatments as scheduled  -Administer IV fluids & IV Antibiotics  -Administer scheduled and PRN medications  -Monitor vital signs routinely  -PT/OT orders for evaluation  - See additional Care Plan goals for specific interventions  Outcome: Progressing  Goal: Patient/Family Short Term Goal  Description: Patient's Short Term Goal: For confusion to resolve, gain more strength    Interventions:   - Nephrology Consult  -Receive Hemodialysis treatments as scheduled  -Administer IV fluids & IV Antibiotics  -Administer scheduled and PRN medications  -Monitor vital signs routinely  -PT/OT orders for evaluation    - See additional Care Plan goals for specific interventions  Outcome: Progressing     Problem: PAIN - ADULT  Goal: Verbalizes/displays adequate comfort level or patient's stated pain goal  Description: INTERVENTIONS:  - Encourage pt to monitor pain and request assistance  - Assess pain using appropriate pain scale  - Administer analgesics based on type and severity of pain and evaluate response  - Implement non-pharmacological measures as appropriate and evaluate response  - Consider cultural and social influences on pain and pain management  - Manage/alleviate anxiety  - Utilize distraction and/or relaxation techniques  - Monitor for opioid side effects  - Notify MD/LIP if interventions unsuccessful or patient reports new pain  - Anticipate increased pain with activity and pre-medicate as appropriate  Outcome: Progressing     Problem: SAFETY ADULT - FALL  Goal: Free from fall injury  Description: INTERVENTIONS:  - Assess pt frequently for physical needs  - Identify cognitive and physical deficits and behaviors that affect risk of falls.   - [place] fall precautions as indicated by assessment.  - Educate pt/family on patient safety including physical limitations  - Instruct pt to call for assistance with activity based on assessment  - Modify environment to reduce risk of injury  - Provide assistive devices as appropriate  - Consider OT/PT consult to assist with strengthening/mobility  - Encourage toileting schedule  Outcome: Progressing     Problem: CARDIOVASCULAR - ADULT  Goal: Maintains optimal cardiac output and hemodynamic stability  Description: INTERVENTIONS:  - Monitor vital signs, rhythm, and trends  - Monitor for bleeding, hypotension and signs of decreased cardiac output  - Evaluate effectiveness of vasoactive medications to optimize hemodynamic stability  - Monitor arterial and/or venous puncture sites for bleeding and/or hematoma  - Assess quality of pulses, skin color and temperature  - Assess for signs of decreased coronary artery perfusion - ex.  Angina  - Evaluate fluid balance, assess for edema, trend weights  Outcome: Progressing  Goal: Absence of cardiac arrhythmias or at baseline  Description: INTERVENTIONS:  - Continuous cardiac monitoring, monitor vital signs, obtain 12 lead EKG if indicated  - Evaluate effectiveness of antiarrhythmic and heart rate control medications as ordered  - Initiate emergency measures for life threatening arrhythmias  - Monitor electrolytes and administer replacement therapy as ordered  Outcome: Progressing     Problem: RESPIRATORY - ADULT  Goal: Achieves optimal ventilation and oxygenation  Description: INTERVENTIONS:  - Assess for changes in respiratory status  - Assess for changes in mentation and behavior  - Position to facilitate oxygenation and minimize respiratory effort  - Oxygen supplementation based on oxygen saturation or ABGs  - Provide Smoking Cessation handout, if applicable  - Encourage broncho-pulmonary hygiene including cough, deep breathe, Incentive Spirometry  - Assess the need for suctioning and perform as needed  - Assess and instruct to report SOB or any respiratory difficulty  - Respiratory Therapy support as indicated  - Manage/alleviate anxiety  - Monitor for signs/symptoms of CO2 retention  Outcome: Progressing     Problem: METABOLIC/FLUID AND ELECTROLYTES - ADULT  Goal: Glucose maintained within prescribed range  Description: INTERVENTIONS:  - Monitor Blood Glucose as ordered  - Assess for signs and symptoms of hyperglycemia and hypoglycemia  - Administer ordered medications to maintain glucose within target range  - Assess barriers to adequate nutritional intake and initiate nutrition consult as needed  - Instruct patient on self management of diabetes  Outcome: Progressing  Goal: Electrolytes maintained within normal limits  Description: INTERVENTIONS:  - Monitor labs and rhythm and assess patient for signs and symptoms of electrolyte imbalances  - Administer electrolyte replacement as ordered  - Monitor response to electrolyte replacements, including rhythm and repeat lab results as appropriate  - Fluid restriction as ordered  - Instruct patient on fluid and nutrition restrictions as appropriate  Outcome: Progressing  Goal: Hemodynamic stability and optimal renal function maintained  Description: INTERVENTIONS:  - Monitor labs and assess for signs and symptoms of volume excess or deficit  - Monitor intake, output and patient weight  - Monitor urine specific gravity, serum osmolarity and serum sodium as indicated or ordered  - Monitor response to interventions for patient's volume status, including labs, urine output, blood pressure (other measures as available)  - Encourage oral intake as appropriate  - Instruct patient on fluid and nutrition restrictions as appropriate  Outcome: Progressing     Problem: SKIN/TISSUE INTEGRITY - ADULT  Goal: Skin integrity remains intact  Description: INTERVENTIONS  - Assess and document risk factors for pressure ulcer development  - Assess and document skin integrity  - Monitor for areas of redness and/or skin breakdown  - Initiate interventions, skin care algorithm/standards of care as needed  Outcome: Progressing  Goal: Oral mucous membranes remain intact  Description: INTERVENTIONS  - Assess oral mucosa and hygiene practices  - Implement preventative oral hygiene regimen  - Implement oral medicated treatments as ordered  Outcome: Progressing     Problem: NEUROLOGICAL - ADULT  Goal: Achieves stable or improved neurological status  Description: INTERVENTIONS  - Assess for and report changes in neurological status  - Initiate measures to prevent increased intracranial pressure  - Maintain blood pressure and fluid volume within ordered parameters to optimize cerebral perfusion and minimize risk of hemorrhage  - Monitor temperature, glucose, and sodium. Initiate appropriate interventions as ordered  Outcome: Progressing  Goal: Achieves maximal functionality and self care  Description: INTERVENTIONS  - Monitor swallowing and airway patency with patient fatigue and changes in neurological status  - Encourage and assist patient to increase activity and self care with guidance from PT/OT  - Encourage visually impaired, hearing impaired and aphasic patients to use assistive/communication devices  Outcome: Progressing     Problem: Impaired Cognition  Goal: Patient will exhibit improved attention, thought processing and/or memory  Description: Interventions:  - Allow additional time for processing after asking questions or providing instructions  Outcome: Progressing     Problem: HEMATOLOGIC - ADULT  Goal: Free from bleeding injury  Description: (Example usage: patient with low platelets)  INTERVENTIONS:  - Avoid intramuscular injections, enemas and rectal medication administration  - Ensure safe mobilization of patient  - Hold pressure on venipuncture sites to achieve adequate hemostasis  - Assess for signs and symptoms of internal bleeding  - Monitor lab trends  - Patient is to report abnormal signs of bleeding to staff  - Avoid use of toothpicks and dental floss  - Use electric shaver for shaving  - Use soft bristle tooth brush  - Limit straining and forceful nose blowing  Outcome: Progressing     No acute changes at this time.

## 2022-07-26 NOTE — PLAN OF CARE
Phone call made to [facility] and spoke to [facility] to arrange for hemodialysis treatment for tomorrow, 7/27/2022 per Dr. Jeny Perez. Hemodialysis has been arranged for tomorrow per doctor orders.

## 2022-07-26 NOTE — CM/SW NOTE
07/26/22 1500   CM/SW Screening   Referral [facility] staff; Chart review;Nursing rounds   Patient's Current Mental Status at Time of Assessment Alert;Oriented   Patient's Home Environment Condo/Apt no elevator   Number of Levels in Home 2   Number of Stair in Home   (3 MARCIAL, 7 stairs to lower level apt)   Patient lives with Alone   Patient Status Prior to Admission   Independent with ADLs and Mobility Yes   Services in place prior to admission DME/Supplies at home;Dialysis   Type of DME/Supplies Straight Cane;[employer]; [facility] [address]  [facility])   Scheduled Dialysis days M-W-F   Pt unavailable for dc planning (off unit) CM spoke with POA/Dtr Milla hernandez dc planning. Dtr confirmed address and provide above info. Dtr tearful during assessment stating \"there is just a lot going on\". Dtr sts she recently moved out of the apt however is still her mom's primary cg and drives her mom to HD and medical appts. .    Per dtr pt lives in a ground unit apt (down 7 stairs) with mult stairs. Pt has been able to do stairs but is slowing down. Dtr is agreeable to [facility] ref being sent. f2f done. CM discussed DCSS eval for homemaker, dtr declined at this time. 7/28 1525  PT rec is [facility]- see notes for details. RN  Reports pt refusing [facility]. MD and dtr aware. PT will continue to work with pt on stairs as she has mult stairs in/out of lower level apt. Pt will need to get out to HD 3x/week and f/u medical appointments after dc. CM sent tentative [facility] ref as back up plan, will need PASRR done if dc plan is [facility]    7/29 1525  MDO for dc. Pt continues to refuse MD [facility] and RN aware. Pt accepted by and agreeable to [facility]. Plan  Home with [place] [employer] Program    Plan of care reviewed for care coordination and discharge planning.  Noted pt falls under  BPCI/Medicare program, with [facility] Sepsis [phone], W)    [address] [place] Proposal:  [facility]  pending progress    / to remain available for support and/or discharge planning.      Kinza Keen RN    Ext [phone]

## 2022-07-27 ENCOUNTER — APPOINTMENT (OUTPATIENT)
Dept: MRI IMAGING | Facility: HOSPITAL | Age: 67
End: 2022-07-27
Attending: INTERNAL MEDICINE
Payer: MEDICARE

## 2022-07-27 PROBLEM — G93.89 BRAIN MASS: Status: ACTIVE | Noted: 2022-07-27

## 2022-07-27 PROBLEM — G93.89 BRAIN MASS: Status: ACTIVE | Noted: 2022-01-01

## 2022-07-27 NOTE — PLAN OF CARE
Problem: Patient Centered Care  Goal: Patient preferences are identified and integrated in the patient's plan of care  Description: Interventions:  - What would you like us to know as we care for you? I been living at home alone for last 3 months, I use to live with my daughter. I came in with increased weakness, fatigue, and more confusion the last 2 weeks.    - Provide timely, complete, and accurate information to patient/family  - Incorporate patient and family knowledge, values, beliefs, and cultural backgrounds into the planning and delivery of care  - Encourage patient/family to participate in care and decision-making at the level they choose  - Honor patient and family perspectives and choices  Outcome: Progressing     Problem: Diabetes/Glucose Control  Goal: Glucose maintained within prescribed range  Description: INTERVENTIONS:  - Monitor Blood Glucose as ordered  - Assess for signs and symptoms of hyperglycemia and hypoglycemia  - Administer ordered medications to maintain glucose within target range  - Assess barriers to adequate nutritional intake and initiate nutrition consult as needed  - Instruct patient on self management of diabetes  Outcome: Progressing     Problem: Patient/Family Goals  Goal: Patient/Family Long Term Goal  Description: Patient's Long Term Goal: To feel better and go back home    Interventions:  -Nephrology Consult  -Receive Hemodialysis treatments as scheduled  -Administer IV fluids & IV Antibiotics  -Administer scheduled and PRN medications  -Monitor vital signs routinely  -PT/OT orders for evaluation  - See additional Care Plan goals for specific interventions  Outcome: Progressing  Goal: Patient/Family Short Term Goal  Description: Patient's Short Term Goal: For confusion to resolve, gain more strength    Interventions:   - Nephrology Consult  -Receive Hemodialysis treatments as scheduled  -Administer IV fluids & IV Antibiotics  -Administer scheduled and PRN medications  -Monitor vital signs routinely  -PT/OT orders for evaluation    - See additional Care Plan goals for specific interventions  Outcome: Progressing     Problem: PAIN - ADULT  Goal: Verbalizes/displays adequate comfort level or patient's stated pain goal  Description: INTERVENTIONS:  - Encourage pt to monitor pain and request assistance  - Assess pain using appropriate pain scale  - Administer analgesics based on type and severity of pain and evaluate response  - Implement non-pharmacological measures as appropriate and evaluate response  - Consider cultural and social influences on pain and pain management  - Manage/alleviate anxiety  - Utilize distraction and/or relaxation techniques  - Monitor for opioid side effects  - Notify MD/LIP if interventions unsuccessful or patient reports new pain  - Anticipate increased pain with activity and pre-medicate as appropriate  Outcome: Progressing     Problem: SAFETY ADULT - FALL  Goal: Free from fall injury  Description: INTERVENTIONS:  - Assess pt frequently for physical needs  - Identify cognitive and physical deficits and behaviors that affect risk of falls.   - [place] fall precautions as indicated by assessment.  - Educate pt/family on patient safety including physical limitations  - Instruct pt to call for assistance with activity based on assessment  - Modify environment to reduce risk of injury  - Provide assistive devices as appropriate  - Consider OT/PT consult to assist with strengthening/mobility  - Encourage toileting schedule  Outcome: Progressing     Problem: RESPIRATORY - ADULT  Goal: Achieves optimal ventilation and oxygenation  Description: INTERVENTIONS:  - Assess for changes in respiratory status  - Assess for changes in mentation and behavior  - Position to facilitate oxygenation and minimize respiratory effort  - Oxygen supplementation based on oxygen saturation or ABGs  - Provide Smoking Cessation handout, if applicable  - Encourage broncho-pulmonary hygiene including cough, deep breathe, Incentive Spirometry  - Assess the need for suctioning and perform as needed  - Assess and instruct to report SOB or any respiratory difficulty  - Respiratory Therapy support as indicated  - Manage/alleviate anxiety  - Monitor for signs/symptoms of CO2 retention  Outcome: Progressing     Problem: METABOLIC/FLUID AND ELECTROLYTES - ADULT  Goal: Glucose maintained within prescribed range  Description: INTERVENTIONS:  - Monitor Blood Glucose as ordered  - Assess for signs and symptoms of hyperglycemia and hypoglycemia  - Administer ordered medications to maintain glucose within target range  - Assess barriers to adequate nutritional intake and initiate nutrition consult as needed  - Instruct patient on self management of diabetes  Outcome: Progressing  Patient is resting comfortably in bed, denies pain, VSS, safety/fall precautions in place.  Call light within reach, all needs met at this time

## 2022-07-27 NOTE — TELEPHONE ENCOUNTER
Jeniffer Soto / [facility] is calling to notify Dr Florence Quinonez she put a Query in [address] [place] [address] Rd for him    Please answer the Query then sign.     Any questions please call [phone]

## 2022-07-27 NOTE — PLAN OF CARE
Problem: Patient Centered Care  Goal: Patient preferences are identified and integrated in the patient's plan of care  Description: Interventions:  - What would you like us to know as we care for you? I been living at home alone for last 3 months, I use to live with my daughter. I came in with increased weakness, fatigue, and more confusion the last 2 weeks.    - Provide timely, complete, and accurate information to patient/family  - Incorporate patient and family knowledge, values, beliefs, and cultural backgrounds into the planning and delivery of care  - Encourage patient/family to participate in care and decision-making at the level they choose  - Honor patient and family perspectives and choices  Outcome: Progressing     Problem: Diabetes/Glucose Control  Goal: Glucose maintained within prescribed range  Description: INTERVENTIONS:  - Monitor Blood Glucose as ordered  - Assess for signs and symptoms of hyperglycemia and hypoglycemia  - Administer ordered medications to maintain glucose within target range  - Assess barriers to adequate nutritional intake and initiate nutrition consult as needed  - Instruct patient on self management of diabetes  Outcome: Progressing     Problem: Patient/Family Goals  Goal: Patient/Family Long Term Goal  Description: Patient's Long Term Goal: To feel better and go back home    Interventions:  -Nephrology Consult  -Receive Hemodialysis treatments as scheduled  -Administer IV fluids & IV Antibiotics  -Administer scheduled and PRN medications  -Monitor vital signs routinely  -PT/OT orders for evaluation  - See additional Care Plan goals for specific interventions  Outcome: Progressing  Goal: Patient/Family Short Term Goal  Description: Patient's Short Term Goal: For confusion to resolve, gain more strength    Interventions:   - Nephrology Consult  -Receive Hemodialysis treatments as scheduled  -Administer IV fluids & IV Antibiotics  -Administer scheduled and PRN medications  -Monitor vital signs routinely  -PT/OT orders for evaluation    - See additional Care Plan goals for specific interventions  Outcome: Progressing     Problem: PAIN - ADULT  Goal: Verbalizes/displays adequate comfort level or patient's stated pain goal  Description: INTERVENTIONS:  - Encourage pt to monitor pain and request assistance  - Assess pain using appropriate pain scale  - Administer analgesics based on type and severity of pain and evaluate response  - Implement non-pharmacological measures as appropriate and evaluate response  - Consider cultural and social influences on pain and pain management  - Manage/alleviate anxiety  - Utilize distraction and/or relaxation techniques  - Monitor for opioid side effects  - Notify MD/LIP if interventions unsuccessful or patient reports new pain  - Anticipate increased pain with activity and pre-medicate as appropriate  Outcome: Progressing     Problem: SAFETY ADULT - FALL  Goal: Free from fall injury  Description: INTERVENTIONS:  - Assess pt frequently for physical needs  - Identify cognitive and physical deficits and behaviors that affect risk of falls.   - [place] fall precautions as indicated by assessment.  - Educate pt/family on patient safety including physical limitations  - Instruct pt to call for assistance with activity based on assessment  - Modify environment to reduce risk of injury  - Provide assistive devices as appropriate  - Consider OT/PT consult to assist with strengthening/mobility  - Encourage toileting schedule  Outcome: Progressing     Problem: CARDIOVASCULAR - ADULT  Goal: Maintains optimal cardiac output and hemodynamic stability  Description: INTERVENTIONS:  - Monitor vital signs, rhythm, and trends  - Monitor for bleeding, hypotension and signs of decreased cardiac output  - Evaluate effectiveness of vasoactive medications to optimize hemodynamic stability  - Monitor arterial and/or venous puncture sites for bleeding and/or hematoma  - Assess quality of pulses, skin color and temperature  - Assess for signs of decreased coronary artery perfusion - ex.  Angina  - Evaluate fluid balance, assess for edema, trend weights  Outcome: Progressing  Goal: Absence of cardiac arrhythmias or at baseline  Description: INTERVENTIONS:  - Continuous cardiac monitoring, monitor vital signs, obtain 12 lead EKG if indicated  - Evaluate effectiveness of antiarrhythmic and heart rate control medications as ordered  - Initiate emergency measures for life threatening arrhythmias  - Monitor electrolytes and administer replacement therapy as ordered  Outcome: Progressing     Problem: RESPIRATORY - ADULT  Goal: Achieves optimal ventilation and oxygenation  Description: INTERVENTIONS:  - Assess for changes in respiratory status  - Assess for changes in mentation and behavior  - Position to facilitate oxygenation and minimize respiratory effort  - Oxygen supplementation based on oxygen saturation or ABGs  - Provide Smoking Cessation handout, if applicable  - Encourage broncho-pulmonary hygiene including cough, deep breathe, Incentive Spirometry  - Assess the need for suctioning and perform as needed  - Assess and instruct to report SOB or any respiratory difficulty  - Respiratory Therapy support as indicated  - Manage/alleviate anxiety  - Monitor for signs/symptoms of CO2 retention  Outcome: Progressing     Problem: METABOLIC/FLUID AND ELECTROLYTES - ADULT  Goal: Glucose maintained within prescribed range  Description: INTERVENTIONS:  - Monitor Blood Glucose as ordered  - Assess for signs and symptoms of hyperglycemia and hypoglycemia  - Administer ordered medications to maintain glucose within target range  - Assess barriers to adequate nutritional intake and initiate nutrition consult as needed  - Instruct patient on self management of diabetes  Outcome: Progressing  Goal: Electrolytes maintained within normal limits  Description: INTERVENTIONS:  - Monitor labs and rhythm and assess patient for signs and symptoms of electrolyte imbalances  - Administer electrolyte replacement as ordered  - Monitor response to electrolyte replacements, including rhythm and repeat lab results as appropriate  - Fluid restriction as ordered  - Instruct patient on fluid and nutrition restrictions as appropriate  Outcome: Progressing  Goal: Hemodynamic stability and optimal renal function maintained  Description: INTERVENTIONS:  - Monitor labs and assess for signs and symptoms of volume excess or deficit  - Monitor intake, output and patient weight  - Monitor urine specific gravity, serum osmolarity and serum sodium as indicated or ordered  - Monitor response to interventions for patient's volume status, including labs, urine output, blood pressure (other measures as available)  - Encourage oral intake as appropriate  - Instruct patient on fluid and nutrition restrictions as appropriate  Outcome: Progressing    Patient fluid intake is monitored. Hemodialysis done today, per Hemodialysis RN patient educated on fluid overload and potassium levels and fluid intake-education reinforced by this RN at the bedside. Call light within reach.      Problem: SKIN/TISSUE INTEGRITY - ADULT  Goal: Skin integrity remains intact  Description: INTERVENTIONS  - Assess and document risk factors for pressure ulcer development  - Assess and document skin integrity  - Monitor for areas of redness and/or skin breakdown  - Initiate interventions, skin care algorithm/standards of care as needed  Outcome: Progressing  Goal: Oral mucous membranes remain intact  Description: INTERVENTIONS  - Assess oral mucosa and hygiene practices  - Implement preventative oral hygiene regimen  - Implement oral medicated treatments as ordered  Outcome: Progressing     Problem: NEUROLOGICAL - ADULT  Goal: Achieves stable or improved neurological status  Description: INTERVENTIONS  - Assess for and report changes in neurological status  - Initiate measures to prevent increased intracranial pressure  - Maintain blood pressure and fluid volume within ordered parameters to optimize cerebral perfusion and minimize risk of hemorrhage  - Monitor temperature, glucose, and sodium. Initiate appropriate interventions as ordered  Outcome: Progressing  Goal: Achieves maximal functionality and self care  Description: INTERVENTIONS  - Monitor swallowing and airway patency with patient fatigue and changes in neurological status  - Encourage and assist patient to increase activity and self care with guidance from PT/OT  - Encourage visually impaired, hearing impaired and aphasic patients to use assistive/communication devices  Outcome: Progressing     Problem: Impaired Cognition  Goal: Patient will exhibit improved attention, thought processing and/or memory  Description: Interventions:  - Allow additional time for processing after asking questions or providing instructions  Outcome: Progressing     Problem: HEMATOLOGIC - ADULT  Goal: Free from bleeding injury  Description: (Example usage: patient with low platelets)  INTERVENTIONS:  - Avoid intramuscular injections, enemas and rectal medication administration  - Ensure safe mobilization of patient  - Hold pressure on venipuncture sites to achieve adequate hemostasis  - Assess for signs and symptoms of internal bleeding  - Monitor lab trends  - Patient is to report abnormal signs of bleeding to staff  - Avoid use of toothpicks and dental floss  - Use electric shaver for shaving  - Use soft bristle tooth brush  - Limit straining and forceful nose blowing  Outcome: Progressing    Patient is presently resting in the bed. Alert x 4. Patient denied pain or discomfort. Patient was seen by PT per consult. Vital signs taken and stable. Tolerates diet. Fall risk precautions are in place for safety. Neurologist on consult. MRI of brain ordered. Endorsed to night nurse that MRI checklist needs to be done.

## 2022-07-27 NOTE — PLAN OF CARE
Problem: Patient Centered Care  Goal: Patient preferences are identified and integrated in the patient's plan of care  Description: Interventions:  - What would you like us to know as we care for you? I been living at home alone for last 3 months, I use to live with my daughter. I came in with increased weakness, fatigue, and more confusion the last 2 weeks.    - Provide timely, complete, and accurate information to patient/family  - Incorporate patient and family knowledge, values, beliefs, and cultural backgrounds into the planning and delivery of care  - Encourage patient/family to participate in care and decision-making at the level they choose  - Honor patient and family perspectives and choices  Outcome: Progressing     Problem: Diabetes/Glucose Control  Goal: Glucose maintained within prescribed range  Description: INTERVENTIONS:  - Monitor Blood Glucose as ordered  - Assess for signs and symptoms of hyperglycemia and hypoglycemia  - Administer ordered medications to maintain glucose within target range  - Assess barriers to adequate nutritional intake and initiate nutrition consult as needed  - Instruct patient on self management of diabetes  Outcome: Progressing     Problem: Patient/Family Goals  Goal: Patient/Family Long Term Goal  Description: Patient's Long Term Goal: To feel better and go back home    Interventions:  -Nephrology Consult  -Receive Hemodialysis treatments as scheduled  -Administer IV fluids & IV Antibiotics  -Administer scheduled and PRN medications  -Monitor vital signs routinely  -PT/OT orders for evaluation  - See additional Care Plan goals for specific interventions  Outcome: Progressing  Goal: Patient/Family Short Term Goal  Description: Patient's Short Term Goal: For confusion to resolve, gain more strength    Interventions:   - Nephrology Consult  -Receive Hemodialysis treatments as scheduled  -Administer IV fluids & IV Antibiotics  -Administer scheduled and PRN medications  -Monitor vital signs routinely  -PT/OT orders for evaluation    - See additional Care Plan goals for specific interventions  Outcome: Progressing     Problem: PAIN - ADULT  Goal: Verbalizes/displays adequate comfort level or patient's stated pain goal  Description: INTERVENTIONS:  - Encourage pt to monitor pain and request assistance  - Assess pain using appropriate pain scale  - Administer analgesics based on type and severity of pain and evaluate response  - Implement non-pharmacological measures as appropriate and evaluate response  - Consider cultural and social influences on pain and pain management  - Manage/alleviate anxiety  - Utilize distraction and/or relaxation techniques  - Monitor for opioid side effects  - Notify MD/LIP if interventions unsuccessful or patient reports new pain  - Anticipate increased pain with activity and pre-medicate as appropriate  Outcome: Progressing     Problem: SAFETY ADULT - FALL  Goal: Free from fall injury  Description: INTERVENTIONS:  - Assess pt frequently for physical needs  - Identify cognitive and physical deficits and behaviors that affect risk of falls.   - [place] fall precautions as indicated by assessment.  - Educate pt/family on patient safety including physical limitations  - Instruct pt to call for assistance with activity based on assessment  - Modify environment to reduce risk of injury  - Provide assistive devices as appropriate  - Consider OT/PT consult to assist with strengthening/mobility  - Encourage toileting schedule  Outcome: Progressing     Problem: CARDIOVASCULAR - ADULT  Goal: Maintains optimal cardiac output and hemodynamic stability  Description: INTERVENTIONS:  - Monitor vital signs, rhythm, and trends  - Monitor for bleeding, hypotension and signs of decreased cardiac output  - Evaluate effectiveness of vasoactive medications to optimize hemodynamic stability  - Monitor arterial and/or venous puncture sites for bleeding and/or hematoma  - Assess quality of pulses, skin color and temperature  - Assess for signs of decreased coronary artery perfusion - ex.  Angina  - Evaluate fluid balance, assess for edema, trend weights  Outcome: Progressing  Goal: Absence of cardiac arrhythmias or at baseline  Description: INTERVENTIONS:  - Continuous cardiac monitoring, monitor vital signs, obtain 12 lead EKG if indicated  - Evaluate effectiveness of antiarrhythmic and heart rate control medications as ordered  - Initiate emergency measures for life threatening arrhythmias  - Monitor electrolytes and administer replacement therapy as ordered  Outcome: Progressing     Problem: RESPIRATORY - ADULT  Goal: Achieves optimal ventilation and oxygenation  Description: INTERVENTIONS:  - Assess for changes in respiratory status  - Assess for changes in mentation and behavior  - Position to facilitate oxygenation and minimize respiratory effort  - Oxygen supplementation based on oxygen saturation or ABGs  - Provide Smoking Cessation handout, if applicable  - Encourage broncho-pulmonary hygiene including cough, deep breathe, Incentive Spirometry  - Assess the need for suctioning and perform as needed  - Assess and instruct to report SOB or any respiratory difficulty  - Respiratory Therapy support as indicated  - Manage/alleviate anxiety  - Monitor for signs/symptoms of CO2 retention  Outcome: Progressing     Problem: METABOLIC/FLUID AND ELECTROLYTES - ADULT  Goal: Glucose maintained within prescribed range  Description: INTERVENTIONS:  - Monitor Blood Glucose as ordered  - Assess for signs and symptoms of hyperglycemia and hypoglycemia  - Administer ordered medications to maintain glucose within target range  - Assess barriers to adequate nutritional intake and initiate nutrition consult as needed  - Instruct patient on self management of diabetes  Outcome: Progressing  Goal: Electrolytes maintained within normal limits  Description: INTERVENTIONS:  - Monitor labs and rhythm and assess patient for signs and symptoms of electrolyte imbalances  - Administer electrolyte replacement as ordered  - Monitor response to electrolyte replacements, including rhythm and repeat lab results as appropriate  - Fluid restriction as ordered  - Instruct patient on fluid and nutrition restrictions as appropriate  Outcome: Progressing  Goal: Hemodynamic stability and optimal renal function maintained  Description: INTERVENTIONS:  - Monitor labs and assess for signs and symptoms of volume excess or deficit  - Monitor intake, output and patient weight  - Monitor urine specific gravity, serum osmolarity and serum sodium as indicated or ordered  - Monitor response to interventions for patient's volume status, including labs, urine output, blood pressure (other measures as available)  - Encourage oral intake as appropriate  - Instruct patient on fluid and nutrition restrictions as appropriate  Outcome: Progressing     Problem: SKIN/TISSUE INTEGRITY - ADULT  Goal: Skin integrity remains intact  Description: INTERVENTIONS  - Assess and document risk factors for pressure ulcer development  - Assess and document skin integrity  - Monitor for areas of redness and/or skin breakdown  - Initiate interventions, skin care algorithm/standards of care as needed  Outcome: Progressing  Goal: Oral mucous membranes remain intact  Description: INTERVENTIONS  - Assess oral mucosa and hygiene practices  - Implement preventative oral hygiene regimen  - Implement oral medicated treatments as ordered  Outcome: Progressing     Problem: NEUROLOGICAL - ADULT  Goal: Achieves stable or improved neurological status  Description: INTERVENTIONS  - Assess for and report changes in neurological status  - Initiate measures to prevent increased intracranial pressure  - Maintain blood pressure and fluid volume within ordered parameters to optimize cerebral perfusion and minimize risk of hemorrhage  - Monitor temperature, glucose, and sodium.  Initiate appropriate interventions as ordered  Outcome: Progressing  Goal: Achieves maximal functionality and self care  Description: INTERVENTIONS  - Monitor swallowing and airway patency with patient fatigue and changes in neurological status  - Encourage and assist patient to increase activity and self care with guidance from PT/OT  - Encourage visually impaired, hearing impaired and aphasic patients to use assistive/communication devices  Outcome: Progressing     Problem: Impaired Cognition  Goal: Patient will exhibit improved attention, thought processing and/or memory  Description: Interventions:    Outcome: Progressing     Problem: HEMATOLOGIC - ADULT  Goal: Free from bleeding injury  Description: (Example usage: patient with low platelets)  INTERVENTIONS:  - Avoid intramuscular injections, enemas and rectal medication administration  - Ensure safe mobilization of patient  - Hold pressure on venipuncture sites to achieve adequate hemostasis  - Assess for signs and symptoms of internal bleeding  - Monitor lab trends  - Patient is to report abnormal signs of bleeding to staff  - Avoid use of toothpicks and dental floss  - Use electric shaver for shaving  - Use soft bristle tooth brush  - Limit straining and forceful nose blowing  Outcome: Progressing     Problem: Impaired Functional Mobility  Goal: Achieve highest/safest level of mobility/gait  Description: Interventions:  - Assess patient's functional ability and stability  - Promote increasing activity/tolerance for mobility and gait  - Educate and engage patient/family in tolerated activity level and precautions  Outcome: Not Progressing     Problem: MUSCULOSKELETAL - ADULT  Goal: Return mobility to safest level of function  Description: INTERVENTIONS:  - Assess patient stability and activity tolerance for standing, transferring and ambulating w/ or w/o assistive devices  - Assist with transfers and ambulation using safe patient handling equipment as needed  - Ensure adequate protection for wounds/incisions during mobilization  - Obtain PT/OT consults as needed  - Advance activity as appropriate  - Communicate ordered activity level and limitations with patient/family  Outcome: Not Progressing  Goal: Maintain proper alignment of affected body part  Description: INTERVENTIONS:  - Support and protect limb and body alignment per provider's orders  - Instruct and reinforce with patient and family use of appropriate assistive device and precautions (e.g. spinal or hip dislocation precautions)  Outcome: Not Progressing       No complaints of pain. Oxygen weaning as tolerated. Up to chair. Rolling chair to bathroom. Pt declining family updates.  Bed in lowest position and call light within reach

## 2022-07-28 NOTE — PLAN OF CARE
Weaning O2 as tolerated. Safety precautions in place. Problem: Patient Centered Care  Goal: Patient preferences are identified and integrated in the patient's plan of care  Description: Interventions:  - What would you like us to know as we care for you? I been living at home alone for last 3 months, I use to live with my daughter. I came in with increased weakness, fatigue, and more confusion the last 2 weeks.    - Provide timely, complete, and accurate information to patient/family  - Incorporate patient and family knowledge, values, beliefs, and cultural backgrounds into the planning and delivery of care  - Encourage patient/family to participate in care and decision-making at the level they choose  - Honor patient and family perspectives and choices  Outcome: Progressing     Problem: Diabetes/Glucose Control  Goal: Glucose maintained within prescribed range  Description: INTERVENTIONS:  - Monitor Blood Glucose as ordered  - Assess for signs and symptoms of hyperglycemia and hypoglycemia  - Administer ordered medications to maintain glucose within target range  - Assess barriers to adequate nutritional intake and initiate nutrition consult as needed  - Instruct patient on self management of diabetes  Outcome: Progressing     Problem: Patient/Family Goals  Goal: Patient/Family Long Term Goal  Description: Patient's Long Term Goal: To feel better and go back home    Interventions:  -Nephrology Consult  -Receive Hemodialysis treatments as scheduled  -Administer IV fluids & IV Antibiotics  -Administer scheduled and PRN medications  -Monitor vital signs routinely  -PT/OT orders for evaluation  - See additional Care Plan goals for specific interventions  Outcome: Progressing  Goal: Patient/Family Short Term Goal  Description: Patient's Short Term Goal: For confusion to resolve, gain more strength    Interventions:   - Nephrology Consult  -Receive Hemodialysis treatments as scheduled  -Administer IV fluids & IV Antibiotics  -Administer scheduled and PRN medications  -Monitor vital signs routinely  -PT/OT orders for evaluation    - See additional Care Plan goals for specific interventions  Outcome: Progressing     Problem: PAIN - ADULT  Goal: Verbalizes/displays adequate comfort level or patient's stated pain goal  Description: INTERVENTIONS:  - Encourage pt to monitor pain and request assistance  - Assess pain using appropriate pain scale  - Administer analgesics based on type and severity of pain and evaluate response  - Implement non-pharmacological measures as appropriate and evaluate response  - Consider cultural and social influences on pain and pain management  - Manage/alleviate anxiety  - Utilize distraction and/or relaxation techniques  - Monitor for opioid side effects  - Notify MD/LIP if interventions unsuccessful or patient reports new pain  - Anticipate increased pain with activity and pre-medicate as appropriate  Outcome: Progressing     Problem: SAFETY ADULT - FALL  Goal: Free from fall injury  Description: INTERVENTIONS:  - Assess pt frequently for physical needs  - Identify cognitive and physical deficits and behaviors that affect risk of falls.   - [place] fall precautions as indicated by assessment.  - Educate pt/family on patient safety including physical limitations  - Instruct pt to call for assistance with activity based on assessment  - Modify environment to reduce risk of injury  - Provide assistive devices as appropriate  - Consider OT/PT consult to assist with strengthening/mobility  - Encourage toileting schedule  Outcome: Progressing     Problem: CARDIOVASCULAR - ADULT  Goal: Maintains optimal cardiac output and hemodynamic stability  Description: INTERVENTIONS:  - Monitor vital signs, rhythm, and trends  - Monitor for bleeding, hypotension and signs of decreased cardiac output  - Evaluate effectiveness of vasoactive medications to optimize hemodynamic stability  - Monitor arterial and/or venous puncture sites for bleeding and/or hematoma  - Assess quality of pulses, skin color and temperature  - Assess for signs of decreased coronary artery perfusion - ex.  Angina  - Evaluate fluid balance, assess for edema, trend weights  Outcome: Progressing  Goal: Absence of cardiac arrhythmias or at baseline  Description: INTERVENTIONS:  - Continuous cardiac monitoring, monitor vital signs, obtain 12 lead EKG if indicated  - Evaluate effectiveness of antiarrhythmic and heart rate control medications as ordered  - Initiate emergency measures for life threatening arrhythmias  - Monitor electrolytes and administer replacement therapy as ordered  Outcome: Progressing     Problem: RESPIRATORY - ADULT  Goal: Achieves optimal ventilation and oxygenation  Description: INTERVENTIONS:  - Assess for changes in respiratory status  - Assess for changes in mentation and behavior  - Position to facilitate oxygenation and minimize respiratory effort  - Oxygen supplementation based on oxygen saturation or ABGs  - Provide Smoking Cessation handout, if applicable  - Encourage broncho-pulmonary hygiene including cough, deep breathe, Incentive Spirometry  - Assess the need for suctioning and perform as needed  - Assess and instruct to report SOB or any respiratory difficulty  - Respiratory Therapy support as indicated  - Manage/alleviate anxiety  - Monitor for signs/symptoms of CO2 retention  Outcome: Progressing     Problem: METABOLIC/FLUID AND ELECTROLYTES - ADULT  Goal: Glucose maintained within prescribed range  Description: INTERVENTIONS:  - Monitor Blood Glucose as ordered  - Assess for signs and symptoms of hyperglycemia and hypoglycemia  - Administer ordered medications to maintain glucose within target range  - Assess barriers to adequate nutritional intake and initiate nutrition consult as needed  - Instruct patient on self management of diabetes  Outcome: Progressing  Goal: Electrolytes maintained within normal limits  Description: INTERVENTIONS:  - Monitor labs and rhythm and assess patient for signs and symptoms of electrolyte imbalances  - Administer electrolyte replacement as ordered  - Monitor response to electrolyte replacements, including rhythm and repeat lab results as appropriate  - Fluid restriction as ordered  - Instruct patient on fluid and nutrition restrictions as appropriate  Outcome: Progressing  Goal: Hemodynamic stability and optimal renal function maintained  Description: INTERVENTIONS:  - Monitor labs and assess for signs and symptoms of volume excess or deficit  - Monitor intake, output and patient weight  - Monitor urine specific gravity, serum osmolarity and serum sodium as indicated or ordered  - Monitor response to interventions for patient's volume status, including labs, urine output, blood pressure (other measures as available)  - Encourage oral intake as appropriate  - Instruct patient on fluid and nutrition restrictions as appropriate  Outcome: Progressing     Problem: SKIN/TISSUE INTEGRITY - ADULT  Goal: Skin integrity remains intact  Description: INTERVENTIONS  - Assess and document risk factors for pressure ulcer development  - Assess and document skin integrity  - Monitor for areas of redness and/or skin breakdown  - Initiate interventions, skin care algorithm/standards of care as needed  Outcome: Progressing  Goal: Oral mucous membranes remain intact  Description: INTERVENTIONS  - Assess oral mucosa and hygiene practices  - Implement preventative oral hygiene regimen  - Implement oral medicated treatments as ordered  Outcome: Progressing     Problem: NEUROLOGICAL - ADULT  Goal: Achieves stable or improved neurological status  Description: INTERVENTIONS  - Assess for and report changes in neurological status  - Initiate measures to prevent increased intracranial pressure  - Maintain blood pressure and fluid volume within ordered parameters to optimize cerebral perfusion and minimize risk of hemorrhage  - Monitor temperature, glucose, and sodium.  Initiate appropriate interventions as ordered  Outcome: Progressing  Goal: Achieves maximal functionality and self care  Description: INTERVENTIONS  - Monitor swallowing and airway patency with patient fatigue and changes in neurological status  - Encourage and assist patient to increase activity and self care with guidance from PT/OT  - Encourage visually impaired, hearing impaired and aphasic patients to use assistive/communication devices  Outcome: Progressing     Problem: Impaired Cognition  Goal: Patient will exhibit improved attention, thought processing and/or memory  Description: Interventions:  Outcome: Progressing     Problem: HEMATOLOGIC - ADULT  Goal: Free from bleeding injury  Description: (Example usage: patient with low platelets)  INTERVENTIONS:  - Avoid intramuscular injections, enemas and rectal medication administration  - Ensure safe mobilization of patient  - Hold pressure on venipuncture sites to achieve adequate hemostasis  - Assess for signs and symptoms of internal bleeding  - Monitor lab trends  - Patient is to report abnormal signs of bleeding to staff  - Avoid use of toothpicks and dental floss  - Use electric shaver for shaving  - Use soft bristle tooth brush  - Limit straining and forceful nose blowing  Outcome: Progressing     Problem: Impaired Functional Mobility  Goal: Achieve highest/safest level of mobility/gait  Description: Interventions:  - Assess patient's functional ability and stability  - Promote increasing activity/tolerance for mobility and gait  - Educate and engage patient/family in tolerated activity level and precautions  Outcome: Progressing     Problem: MUSCULOSKELETAL - ADULT  Goal: Return mobility to safest level of function  Description: INTERVENTIONS:  - Assess patient stability and activity tolerance for standing, transferring and ambulating w/ or w/o assistive devices  - Assist with transfers and ambulation using safe patient handling equipment as needed  - Ensure adequate protection for wounds/incisions during mobilization  - Obtain PT/OT consults as needed  - Advance activity as appropriate  - Communicate ordered activity level and limitations with patient/family  Outcome: Progressing  Goal: Maintain proper alignment of affected body part  Description: INTERVENTIONS:  - Support and protect limb and body alignment per provider's orders  - Instruct and reinforce with patient and family use of appropriate assistive device and precautions (e.g. spinal or hip dislocation precautions)  Outcome: Progressing

## 2022-07-28 NOTE — PLAN OF CARE
Problem: Patient/Family Goals  Goal: Patient/Family Long Term Goal  Description: Patient's Long Term Goal: To feel better and go back home    Interventions:  -Nephrology Consult  -Receive Hemodialysis treatments as scheduled  -Administer IV fluids & IV Antibiotics  -Administer scheduled and PRN medications  -Monitor vital signs routinely  -PT/OT orders for evaluation  - See additional Care Plan goals for specific interventions  Outcome: Progressing  Goal: Patient/Family Short Term Goal  Description: Patient's Short Term Goal: For confusion to resolve, gain more strength    Interventions:   - Nephrology Consult  -Receive Hemodialysis treatments as scheduled  -Administer IV fluids & IV Antibiotics  -Administer scheduled and PRN medications  -Monitor vital signs routinely  -PT/OT orders for evaluation    - See additional Care Plan goals for specific interventions  Outcome: Progressing     Problem: PAIN - ADULT  Goal: Verbalizes/displays adequate comfort level or patient's stated pain goal  Description: INTERVENTIONS:  - Encourage pt to monitor pain and request assistance  - Assess pain using appropriate pain scale  - Administer analgesics based on type and severity of pain and evaluate response  - Implement non-pharmacological measures as appropriate and evaluate response  - Consider cultural and social influences on pain and pain management  - Manage/alleviate anxiety  - Utilize distraction and/or relaxation techniques  - Monitor for opioid side effects  - Notify MD/LIP if interventions unsuccessful or patient reports new pain  - Anticipate increased pain with activity and pre-medicate as appropriate  Outcome: Progressing     Problem: SAFETY ADULT - FALL  Goal: Free from fall injury  Description: INTERVENTIONS:  - Assess pt frequently for physical needs  - Identify cognitive and physical deficits and behaviors that affect risk of falls.   - [place] fall precautions as indicated by assessment.  - Educate pt/family on patient safety including physical limitations  - Instruct pt to call for assistance with activity based on assessment  - Modify environment to reduce risk of injury  - Provide assistive devices as appropriate  - Consider OT/PT consult to assist with strengthening/mobility  - Encourage toileting schedule  Outcome: Progressing     Problem: MUSCULOSKELETAL - ADULT  Goal: Return mobility to safest level of function  Description: INTERVENTIONS:  - Assess patient stability and activity tolerance for standing, transferring and ambulating w/ or w/o assistive devices  - Assist with transfers and ambulation using safe patient handling equipment as needed  - Ensure adequate protection for wounds/incisions during mobilization  - Obtain PT/OT consults as needed  - Advance activity as appropriate  - Communicate ordered activity level and limitations with patient/family  Outcome: Progressing   Patient is resting comfortably in bed, denies pain, VSS, safety/fall precautions in place, MRI done, all needs met at this time

## 2022-07-29 ENCOUNTER — APPOINTMENT (OUTPATIENT)
Dept: RADIATION ONCOLOGY | Facility: HOSPITAL | Age: 67
End: 2022-07-29
Attending: RADIOLOGY
Payer: MEDICARE

## 2022-07-29 ENCOUNTER — TELEPHONE (OUTPATIENT)
Dept: INTERNAL MEDICINE CLINIC | Facility: CLINIC | Age: 67
End: 2022-07-29

## 2022-07-29 RX ORDER — DEXAMETHASONE 4 MG/1
TABLET ORAL
Qty: 1 TABLET | Refills: 0 | COMMUNITY
Start: 2022-07-29

## 2022-07-29 NOTE — PLAN OF CARE
Discharge education provided by this RN. Patient understands follow up instructions. All questions answered and all belongings gathered. Discharged to home with [facility] via superior. Problem: Patient Centered Care  Goal: Patient preferences are identified and integrated in the patient's plan of care  Description: Interventions:  - What would you like us to know as we care for you? I been living at home alone for last 3 months, I use to live with my daughter. I came in with increased weakness, fatigue, and more confusion the last 2 weeks.    - Provide timely, complete, and accurate information to patient/family  - Incorporate patient and family knowledge, values, beliefs, and cultural backgrounds into the planning and delivery of care  - Encourage patient/family to participate in care and decision-making at the level they choose  - Honor patient and family perspectives and choices  Outcome: Adequate for Discharge     Problem: Diabetes/Glucose Control  Goal: Glucose maintained within prescribed range  Description: INTERVENTIONS:  - Monitor Blood Glucose as ordered  - Assess for signs and symptoms of hyperglycemia and hypoglycemia  - Administer ordered medications to maintain glucose within target range  - Assess barriers to adequate nutritional intake and initiate nutrition consult as needed  - Instruct patient on self management of diabetes  Outcome: Adequate for Discharge     Problem: Patient/Family Goals  Goal: Patient/Family Long Term Goal  Description: Patient's Long Term Goal: To feel better and go back home    Interventions:  -Nephrology Consult  -Receive Hemodialysis treatments as scheduled  -Administer IV fluids & IV Antibiotics  -Administer scheduled and PRN medications  -Monitor vital signs routinely  -PT/OT orders for evaluation  - See additional Care Plan goals for specific interventions  Outcome: Adequate for Discharge  Goal: Patient/Family Short Term Goal  Description: Patient's Short Term Goal: For confusion to resolve, gain more strength    Interventions:   - Nephrology Consult  -Receive Hemodialysis treatments as scheduled  -Administer IV fluids & IV Antibiotics  -Administer scheduled and PRN medications  -Monitor vital signs routinely  -PT/OT orders for evaluation    - See additional Care Plan goals for specific interventions  Outcome: Adequate for Discharge     Problem: PAIN - ADULT  Goal: Verbalizes/displays adequate comfort level or patient's stated pain goal  Description: INTERVENTIONS:  - Encourage pt to monitor pain and request assistance  - Assess pain using appropriate pain scale  - Administer analgesics based on type and severity of pain and evaluate response  - Implement non-pharmacological measures as appropriate and evaluate response  - Consider cultural and social influences on pain and pain management  - Manage/alleviate anxiety  - Utilize distraction and/or relaxation techniques  - Monitor for opioid side effects  - Notify MD/LIP if interventions unsuccessful or patient reports new pain  - Anticipate increased pain with activity and pre-medicate as appropriate  Outcome: Adequate for Discharge     Problem: SAFETY ADULT - FALL  Goal: Free from fall injury  Description: INTERVENTIONS:  - Assess pt frequently for physical needs  - Identify cognitive and physical deficits and behaviors that affect risk of falls.   - [place] fall precautions as indicated by assessment.  - Educate pt/family on patient safety including physical limitations  - Instruct pt to call for assistance with activity based on assessment  - Modify environment to reduce risk of injury  - Provide assistive devices as appropriate  - Consider OT/PT consult to assist with strengthening/mobility  - Encourage toileting schedule  Outcome: Adequate for Discharge     Problem: CARDIOVASCULAR - ADULT  Goal: Maintains optimal cardiac output and hemodynamic stability  Description: INTERVENTIONS:  - Monitor vital signs, rhythm, and trends  - Monitor for bleeding, hypotension and signs of decreased cardiac output  - Evaluate effectiveness of vasoactive medications to optimize hemodynamic stability  - Monitor arterial and/or venous puncture sites for bleeding and/or hematoma  - Assess quality of pulses, skin color and temperature  - Assess for signs of decreased coronary artery perfusion - ex.  Angina  - Evaluate fluid balance, assess for edema, trend weights  Outcome: Adequate for Discharge  Goal: Absence of cardiac arrhythmias or at baseline  Description: INTERVENTIONS:  - Continuous cardiac monitoring, monitor vital signs, obtain 12 lead EKG if indicated  - Evaluate effectiveness of antiarrhythmic and heart rate control medications as ordered  - Initiate emergency measures for life threatening arrhythmias  - Monitor electrolytes and administer replacement therapy as ordered  Outcome: Adequate for Discharge     Problem: RESPIRATORY - ADULT  Goal: Achieves optimal ventilation and oxygenation  Description: INTERVENTIONS:  - Assess for changes in respiratory status  - Assess for changes in mentation and behavior  - Position to facilitate oxygenation and minimize respiratory effort  - Oxygen supplementation based on oxygen saturation or ABGs  - Provide Smoking Cessation handout, if applicable  - Encourage broncho-pulmonary hygiene including cough, deep breathe, Incentive Spirometry  - Assess the need for suctioning and perform as needed  - Assess and instruct to report SOB or any respiratory difficulty  - Respiratory Therapy support as indicated  - Manage/alleviate anxiety  - Monitor for signs/symptoms of CO2 retention  Outcome: Adequate for Discharge     Problem: METABOLIC/FLUID AND ELECTROLYTES - ADULT  Goal: Glucose maintained within prescribed range  Description: INTERVENTIONS:  - Monitor Blood Glucose as ordered  - Assess for signs and symptoms of hyperglycemia and hypoglycemia  - Administer ordered medications to maintain glucose within target range  - Assess barriers to adequate nutritional intake and initiate nutrition consult as needed  - Instruct patient on self management of diabetes  Outcome: Adequate for Discharge  Goal: Electrolytes maintained within normal limits  Description: INTERVENTIONS:  - Monitor labs and rhythm and assess patient for signs and symptoms of electrolyte imbalances  - Administer electrolyte replacement as ordered  - Monitor response to electrolyte replacements, including rhythm and repeat lab results as appropriate  - Fluid restriction as ordered  - Instruct patient on fluid and nutrition restrictions as appropriate  Outcome: Adequate for Discharge  Goal: Hemodynamic stability and optimal renal function maintained  Description: INTERVENTIONS:  - Monitor labs and assess for signs and symptoms of volume excess or deficit  - Monitor intake, output and patient weight  - Monitor urine specific gravity, serum osmolarity and serum sodium as indicated or ordered  - Monitor response to interventions for patient's volume status, including labs, urine output, blood pressure (other measures as available)  - Encourage oral intake as appropriate  - Instruct patient on fluid and nutrition restrictions as appropriate  Outcome: Adequate for Discharge     Problem: SKIN/TISSUE INTEGRITY - ADULT  Goal: Skin integrity remains intact  Description: INTERVENTIONS  - Assess and document risk factors for pressure ulcer development  - Assess and document skin integrity  - Monitor for areas of redness and/or skin breakdown  - Initiate interventions, skin care algorithm/standards of care as needed  Outcome: Adequate for Discharge  Goal: Oral mucous membranes remain intact  Description: INTERVENTIONS  - Assess oral mucosa and hygiene practices  - Implement preventative oral hygiene regimen  - Implement oral medicated treatments as ordered  Outcome: Adequate for Discharge     Problem: NEUROLOGICAL - ADULT  Goal: Achieves stable or improved neurological status  Description: INTERVENTIONS  - Assess for and report changes in neurological status  - Initiate measures to prevent increased intracranial pressure  - Maintain blood pressure and fluid volume within ordered parameters to optimize cerebral perfusion and minimize risk of hemorrhage  - Monitor temperature, glucose, and sodium.  Initiate appropriate interventions as ordered  Outcome: Adequate for Discharge  Goal: Achieves maximal functionality and self care  Description: INTERVENTIONS  - Monitor swallowing and airway patency with patient fatigue and changes in neurological status  - Encourage and assist patient to increase activity and self care with guidance from PT/OT  - Encourage visually impaired, hearing impaired and aphasic patients to use assistive/communication devices  Outcome: Adequate for Discharge     Problem: Impaired Cognition  Goal: Patient will exhibit improved attention, thought processing and/or memory  Description: Interventions:  Outcome: Adequate for Discharge     Problem: HEMATOLOGIC - ADULT  Goal: Free from bleeding injury  Description: (Example usage: patient with low platelets)  INTERVENTIONS:  - Avoid intramuscular injections, enemas and rectal medication administration  - Ensure safe mobilization of patient  - Hold pressure on venipuncture sites to achieve adequate hemostasis  - Assess for signs and symptoms of internal bleeding  - Monitor lab trends  - Patient is to report abnormal signs of bleeding to staff  - Avoid use of toothpicks and dental floss  - Use electric shaver for shaving  - Use soft bristle tooth brush  - Limit straining and forceful nose blowing  Outcome: Adequate for Discharge     Problem: Impaired Functional Mobility  Goal: Achieve highest/safest level of mobility/gait  Description: Interventions:  - Assess patient's functional ability and stability  - Promote increasing activity/tolerance for mobility and gait  - Educate and engage patient/family in tolerated activity level and precautions  Outcome: Adequate for Discharge     Problem: MUSCULOSKELETAL - ADULT  Goal: Return mobility to safest level of function  Description: INTERVENTIONS:  - Assess patient stability and activity tolerance for standing, transferring and ambulating w/ or w/o assistive devices  - Assist with transfers and ambulation using safe patient handling equipment as needed  - Ensure adequate protection for wounds/incisions during mobilization  - Obtain PT/OT consults as needed  - Advance activity as appropriate  - Communicate ordered activity level and limitations with patient/family  Outcome: Adequate for Discharge  Goal: Maintain proper alignment of affected body part  Description: INTERVENTIONS:  - Support and protect limb and body alignment per provider's orders  - Instruct and reinforce with patient and family use of appropriate assistive device and precautions (e.g. spinal or hip dislocation precautions)  Outcome: Adequate for Discharge

## 2022-07-29 NOTE — TELEPHONE ENCOUNTER
Pt on 4 mg BID in hospital;  Called [facility] pharmacist to clarify dexamethasone SI mg BID x4 days  4 mg daily x7d  2 mg daily x7d

## 2022-07-29 NOTE — HOME CARE LIAISON
Patient provided with list of [facility] providers from [facility], patient choice is [address]. Liaison updated dtr Milla as well. Agency reserved in [facility] and contact information placed on AVS. Financial interest disclosure provided to patient. ERIKA Charmaine updated.

## 2022-07-29 NOTE — TELEPHONE ENCOUNTER
Dina Oliver called back stating Dr. Renetta Shields signed the Query but he also needs to answer the questions. Dina Oliver can be reached at [phone] if you have any questions.

## 2022-07-29 NOTE — PLAN OF CARE
Patient VSS, no acute changes during shift. 1L O2. Daughter called and given updates. Updated patient on plan of care with HD scheduled in AM. Frequent rounding with bed always in the lowest position. Call light always in reach and safety precautions in place. Problem: Patient Centered Care  Goal: Patient preferences are identified and integrated in the patient's plan of care  Description: Interventions:  - What would you like us to know as we care for you? I been living at home alone for last 3 months, I use to live with my daughter. I came in with increased weakness, fatigue, and more confusion the last 2 weeks.    - Provide timely, complete, and accurate information to patient/family  - Incorporate patient and family knowledge, values, beliefs, and cultural backgrounds into the planning and delivery of care  - Encourage patient/family to participate in care and decision-making at the level they choose  - Honor patient and family perspectives and choices  Outcome: Progressing     Problem: Diabetes/Glucose Control  Goal: Glucose maintained within prescribed range  Description: INTERVENTIONS:  - Monitor Blood Glucose as ordered  - Assess for signs and symptoms of hyperglycemia and hypoglycemia  - Administer ordered medications to maintain glucose within target range  - Assess barriers to adequate nutritional intake and initiate nutrition consult as needed  - Instruct patient on self management of diabetes  Outcome: Progressing     Problem: Patient/Family Goals  Goal: Patient/Family Long Term Goal  Description: Patient's Long Term Goal: To feel better and go back home    Interventions:  -Nephrology Consult  -Receive Hemodialysis treatments as scheduled  -Administer IV fluids & IV Antibiotics  -Administer scheduled and PRN medications  -Monitor vital signs routinely  -PT/OT orders for evaluation  - See additional Care Plan goals for specific interventions  Outcome: Progressing  Goal: Patient/Family Short Term Goal  Description: Patient's Short Term Goal: For confusion to resolve, gain more strength    Interventions:   - Nephrology Consult  -Receive Hemodialysis treatments as scheduled  -Administer IV fluids & IV Antibiotics  -Administer scheduled and PRN medications  -Monitor vital signs routinely  -PT/OT orders for evaluation    - See additional Care Plan goals for specific interventions  Outcome: Progressing     Problem: PAIN - ADULT  Goal: Verbalizes/displays adequate comfort level or patient's stated pain goal  Description: INTERVENTIONS:  - Encourage pt to monitor pain and request assistance  - Assess pain using appropriate pain scale  - Administer analgesics based on type and severity of pain and evaluate response  - Implement non-pharmacological measures as appropriate and evaluate response  - Consider cultural and social influences on pain and pain management  - Manage/alleviate anxiety  - Utilize distraction and/or relaxation techniques  - Monitor for opioid side effects  - Notify MD/LIP if interventions unsuccessful or patient reports new pain  - Anticipate increased pain with activity and pre-medicate as appropriate  Outcome: Progressing     Problem: SAFETY ADULT - FALL  Goal: Free from fall injury  Description: INTERVENTIONS:  - Assess pt frequently for physical needs  - Identify cognitive and physical deficits and behaviors that affect risk of falls.   - [place] fall precautions as indicated by assessment.  - Educate pt/family on patient safety including physical limitations  - Instruct pt to call for assistance with activity based on assessment  - Modify environment to reduce risk of injury  - Provide assistive devices as appropriate  - Consider OT/PT consult to assist with strengthening/mobility  - Encourage toileting schedule  Outcome: Progressing     Problem: CARDIOVASCULAR - ADULT  Goal: Maintains optimal cardiac output and hemodynamic stability  Description: INTERVENTIONS:  - Monitor vital signs, rhythm, and trends  - Monitor for bleeding, hypotension and signs of decreased cardiac output  - Evaluate effectiveness of vasoactive medications to optimize hemodynamic stability  - Monitor arterial and/or venous puncture sites for bleeding and/or hematoma  - Assess quality of pulses, skin color and temperature  - Assess for signs of decreased coronary artery perfusion - ex.  Angina  - Evaluate fluid balance, assess for edema, trend weights  Outcome: Progressing  Goal: Absence of cardiac arrhythmias or at baseline  Description: INTERVENTIONS:  - Continuous cardiac monitoring, monitor vital signs, obtain 12 lead EKG if indicated  - Evaluate effectiveness of antiarrhythmic and heart rate control medications as ordered  - Initiate emergency measures for life threatening arrhythmias  - Monitor electrolytes and administer replacement therapy as ordered  Outcome: Progressing     Problem: RESPIRATORY - ADULT  Goal: Achieves optimal ventilation and oxygenation  Description: INTERVENTIONS:  - Assess for changes in respiratory status  - Assess for changes in mentation and behavior  - Position to facilitate oxygenation and minimize respiratory effort  - Oxygen supplementation based on oxygen saturation or ABGs  - Provide Smoking Cessation handout, if applicable  - Encourage broncho-pulmonary hygiene including cough, deep breathe, Incentive Spirometry  - Assess the need for suctioning and perform as needed  - Assess and instruct to report SOB or any respiratory difficulty  - Respiratory Therapy support as indicated  - Manage/alleviate anxiety  - Monitor for signs/symptoms of CO2 retention  Outcome: Progressing     Problem: METABOLIC/FLUID AND ELECTROLYTES - ADULT  Goal: Glucose maintained within prescribed range  Description: INTERVENTIONS:  - Monitor Blood Glucose as ordered  - Assess for signs and symptoms of hyperglycemia and hypoglycemia  - Administer ordered medications to maintain glucose within target range  - Assess barriers to adequate nutritional intake and initiate nutrition consult as needed  - Instruct patient on self management of diabetes  Outcome: Progressing  Goal: Electrolytes maintained within normal limits  Description: INTERVENTIONS:  - Monitor labs and rhythm and assess patient for signs and symptoms of electrolyte imbalances  - Administer electrolyte replacement as ordered  - Monitor response to electrolyte replacements, including rhythm and repeat lab results as appropriate  - Fluid restriction as ordered  - Instruct patient on fluid and nutrition restrictions as appropriate  Outcome: Progressing  Goal: Hemodynamic stability and optimal renal function maintained  Description: INTERVENTIONS:  - Monitor labs and assess for signs and symptoms of volume excess or deficit  - Monitor intake, output and patient weight  - Monitor urine specific gravity, serum osmolarity and serum sodium as indicated or ordered  - Monitor response to interventions for patient's volume status, including labs, urine output, blood pressure (other measures as available)  - Encourage oral intake as appropriate  - Instruct patient on fluid and nutrition restrictions as appropriate  Outcome: Progressing     Problem: SKIN/TISSUE INTEGRITY - ADULT  Goal: Skin integrity remains intact  Description: INTERVENTIONS  - Assess and document risk factors for pressure ulcer development  - Assess and document skin integrity  - Monitor for areas of redness and/or skin breakdown  - Initiate interventions, skin care algorithm/standards of care as needed  Outcome: Progressing  Goal: Oral mucous membranes remain intact  Description: INTERVENTIONS  - Assess oral mucosa and hygiene practices  - Implement preventative oral hygiene regimen  - Implement oral medicated treatments as ordered  Outcome: Progressing     Problem: NEUROLOGICAL - ADULT  Goal: Achieves stable or improved neurological status  Description: INTERVENTIONS  - Assess for and report changes in neurological status  - Initiate measures to prevent increased intracranial pressure  - Maintain blood pressure and fluid volume within ordered parameters to optimize cerebral perfusion and minimize risk of hemorrhage  - Monitor temperature, glucose, and sodium.  Initiate appropriate interventions as ordered  Outcome: Progressing  Goal: Achieves maximal functionality and self care  Description: INTERVENTIONS  - Monitor swallowing and airway patency with patient fatigue and changes in neurological status  - Encourage and assist patient to increase activity and self care with guidance from PT/OT  - Encourage visually impaired, hearing impaired and aphasic patients to use assistive/communication devices  Outcome: Progressing     Problem: Impaired Cognition  Goal: Patient will exhibit improved attention, thought processing and/or memory  Description: Interventions:  - Minimize distractions in the room when full attention is required  Outcome: Progressing     Problem: Impaired Functional Mobility  Goal: Achieve highest/safest level of mobility/gait  Description: Interventions:  - Assess patient's functional ability and stability  - Promote increasing activity/tolerance for mobility and gait  - Educate and engage patient/family in tolerated activity level and precautions  - Recommend use of sit-stand lift for transfers  Outcome: Progressing     Problem: MUSCULOSKELETAL - ADULT  Goal: Return mobility to safest level of function  Description: INTERVENTIONS:  - Assess patient stability and activity tolerance for standing, transferring and ambulating w/ or w/o assistive devices  - Assist with transfers and ambulation using safe patient handling equipment as needed  - Ensure adequate protection for wounds/incisions during mobilization  - Obtain PT/OT consults as needed  - Advance activity as appropriate  - Communicate ordered activity level and limitations with patient/family  Outcome: Progressing  Goal: Maintain proper alignment of affected body part  Description: INTERVENTIONS:  - Support and protect limb and body alignment per provider's orders  - Instruct and reinforce with patient and family use of appropriate assistive device and precautions (e.g. spinal or hip dislocation precautions)  Outcome: Progressing     Problem: HEMATOLOGIC - ADULT  Goal: Free from bleeding injury  Description: (Example usage: patient with low platelets)  INTERVENTIONS:  - Avoid intramuscular injections, enemas and rectal medication administration  - Ensure safe mobilization of patient  - Hold pressure on venipuncture sites to achieve adequate hemostasis  - Assess for signs and symptoms of internal bleeding  - Monitor lab trends  - Patient is to report abnormal signs of bleeding to staff  - Avoid use of toothpicks and dental floss  - Use electric shaver for shaving  - Use soft bristle tooth brush  - Limit straining and forceful nose blowing  Outcome: Progressing

## 2022-08-01 ENCOUNTER — PATIENT OUTREACH (OUTPATIENT)
Dept: CASE MANAGEMENT | Age: 67
End: 2022-08-01

## 2022-08-01 ENCOUNTER — TELEPHONE (OUTPATIENT)
Dept: INTERNAL MEDICINE CLINIC | Facility: CLINIC | Age: 67
End: 2022-08-01

## 2022-08-01 DIAGNOSIS — I10 PRIMARY HYPERTENSION: ICD-10-CM

## 2022-08-01 DIAGNOSIS — Z02.9 ENCOUNTERS FOR UNSPECIFIED ADMINISTRATIVE PURPOSE: ICD-10-CM

## 2022-08-01 DIAGNOSIS — E66.9 DIABETES MELLITUS TYPE 2 IN OBESE (HCC): ICD-10-CM

## 2022-08-01 DIAGNOSIS — D61.9 ANEMIA DUE TO BONE MARROW FAILURE, UNSPECIFIED BONE MARROW FAILURE TYPE (HCC): Primary | ICD-10-CM

## 2022-08-01 DIAGNOSIS — E11.69 DIABETES MELLITUS TYPE 2 IN OBESE (HCC): ICD-10-CM

## 2022-08-01 DIAGNOSIS — N18.5 CHRONIC RENAL FAILURE, STAGE 5 (HCC): ICD-10-CM

## 2022-08-01 PROCEDURE — 1111F DSCHRG MED/CURRENT MED MERGE: CPT

## 2022-08-01 NOTE — TELEPHONE ENCOUNTER
Received fax from [address]    Need to clarify directions     Is it supposed to be 1 tablet 2 x daily for 11 days    Then half tablet daily x 7 days     Placed in green folder

## 2022-08-01 NOTE — PROGRESS NOTES
1st attempt [facility] apt request    [facility]  [address]  [phone]  [address]    Unable to contact patient. LMTCB.

## 2022-08-01 NOTE — TELEPHONE ENCOUNTER
Patient has sliding scale that was previously written for her and she keeps it in her wallet--she should continue to follow sliding scale coverage--if blood sugars continue to remain elevated, we can then increase sliding scale. However with blood sugar of 106 now, I would hesitate to increase insulin coverage at this time.

## 2022-08-01 NOTE — TELEPHONE ENCOUNTER
Acute/condition update: The patient reported glucose readings have remained elevated at home. The patient reported fasting glucose readings have been in the 300's, but admitted today the fasting glucose reading was 476. The patient denies any polyuria, polydipsia, fruity breath, n/v, or abdominal pain. The patient did admit to experiencing fatigue/weakness after HD earlier today. The patient reported glucose levels returned to normal after taking insulin (106). NCM attempted to review the insulin sliding scale in detail with the patient. The patient was unable to recall the amount of insulin administered this morning. The patient reported taking 48 units of basaglar at bedtime. NCM discussed with the patient in detail side effects of Rx Decadron, and the importance of following a diabetic diet. NCM also reviewed signs/symptoms and glucose readings that would require the patient to contact the PCP vs going to the ED. The patient verbalized understanding. Appointment request:     NCM spoke to the patient today for TCM. The patient was recently hospitalized for hypoxia. The patient declined scheduling a TCM-HFU appointment when offered by the [facility]. The patient prefers to wait to  schedule any appointments until after the oncology appointment with Dr. Bri Jewell on 8/2/2022. A TCM-HFU appointment is recommended by 8/5/2022, as the patient is a high risk for readmission. TRIAGE: Please provide the patient with any further recommendations and assist with scheduling a TCM-HFU appointment. Thank you.

## 2022-08-02 ENCOUNTER — TELEPHONE (OUTPATIENT)
Dept: INTERNAL MEDICINE CLINIC | Facility: CLINIC | Age: 67
End: 2022-08-02

## 2022-08-02 NOTE — TELEPHONE ENCOUNTER
[facility] faxed request for missing information re:  Insulin Lispro    Sig code:need max of how many units per day    Fax in green folder

## 2022-08-03 NOTE — CDS QUERY
How to Answer this Query    1.) Click \"Edit Button\" on the toolbar  2.) Type an \"X\" in the bracket for the diagnosis that applies. (You may also add additional clinical details as you feel necessary to substantiate your response). 3.) Finally click \"Sign\" to complete response. Thank you     CLINICAL DOCUMENTATION CLARIFICATION/VALIDATION FORM  Acute Respiratory Failure  Dear Dr. Fady Hernandez     Please provide additional documentation in the patient's medical record supportive of your documented diagnosis of Acute respiratory failure         (  )     Condition was evident because: ___________________________                  (  )     Condition was ruled out and amended documentation provided in the medical record         (  x)     Clinically unable to provide additional clarity regarding the diagnosis        (  )     Other (please specify)___________________________        (  )     Unknown     __________________________________________________________________   Clinical Indicators:  ED provider: Hypoxia  7-24 H&P:  Acute hypoxic respiratory failure. Hypoxic in the ER requiring supplementary oxygen. Sepsis due to presumed pneumonia  Review of Systems:Respiratory: Cough, Sputum, Wheezing, Shortness of breath  Physical Exam: Respiratory: Clear to auscultation bilaterally. No wheezes/rales/rhonchi     7-25 Dr Fady Hernandez PN:  Pulmonary:      Effort: Pulmonary effort is normal.      Comments: Few fine crackles noted in bases bilaterally. No wheezing or rhonchi  Active problems includes hypoxia     XR CHEST AP PORTABLE    Result Date: 7/23/2022  CONCLUSION:   Cardiomegaly with mild central vascular congestion. Mild linear opacity left mid lung likely atelectasis or scarring     7-24 CT Chest:  1. Scattered atelectasis without further acute intrathoracic process  2.  There is expansile osseous lesion of the right anterolateral 8th rib with associated pathologic fracture deformity     7-23 O2 sat 86% on room air placed on 2L then 3L O2 with O2 sat 96%     Meds include Atrovent nasal solution BID, Zosyn IVPB     If you have any questions, please contact Clinical :  Sanket Funes RN CCDS  at  [phone]   Thank You!      THIS FORM IS A PERMANENT PART OF THE MEDICAL RECORD                                                                  Revision History                                                                                                                                                                                                                Revision History

## 2022-08-04 NOTE — TELEPHONE ENCOUNTER
Patient's daughter Korin Prather is calling to inform Dr Corbin Gonzalez patient has been admitted to [facility]

## 2022-08-04 NOTE — TELEPHONE ENCOUNTER
To Dr Brooks Ward    Daughter Fabiola Gomez calling to inform us that her mom has been admitted to [facility]. Attempted to contact daughter Fabiola Gomez to obtain more info,VM left.

## 2022-08-05 RX ORDER — INSULIN LISPRO 100 [IU]/ML
INJECTION, SOLUTION INTRAVENOUS; SUBCUTANEOUS
Qty: 6 EACH | Refills: 3 | Status: SHIPPED | OUTPATIENT
Start: 2022-08-05

## 2022-08-08 ENCOUNTER — TELEPHONE (OUTPATIENT)
Dept: INTERNAL MEDICINE CLINIC | Facility: CLINIC | Age: 67
End: 2022-08-08

## 2022-08-08 NOTE — TELEPHONE ENCOUNTER
LMTCB. However, per 8/4/22 TE patient is currently admitted at [facility]. Therefore, will await discharge as condition/medications may have changed.

## 2022-08-08 NOTE — TELEPHONE ENCOUNTER
Dr. Marcos Lacey reviewed and signed [address] orders. Faxed back to [phone]. Fax confirmation received. Copy to scanning.

## 2022-08-09 NOTE — TELEPHONE ENCOUNTER
To  - called daughter per hipaa who states her mother is at [facility] for Multiple Myeloma - they are not sure what the treatment will be. Her mother is not doing well, hardly awake. She has now tumors in the brain and chest and she would like to talk to DR. SAMSON

## 2022-08-09 NOTE — TELEPHONE ENCOUNTER
Milla returned call    Requests call back from Dr Liu Hernández re: her mom who is admitted at [address]

## 2022-08-15 ENCOUNTER — TELEPHONE (OUTPATIENT)
Dept: INTERNAL MEDICINE CLINIC | Facility: CLINIC | Age: 67
End: 2022-08-15

## 2022-08-15 NOTE — TELEPHONE ENCOUNTER
Patient's daughter, Nile Mar is calling to inform Dr Ericka Lal of the patient's passing on Saturday, 8/13/2022.      Nile Mar can be reached at Ph # [phone]

## 2023-01-09 NOTE — H&P
[facility]    History and Physical    Whitney Toure Patient Status:  Inpatient    3/7/1955 MRN [id number]   Location [facility] 1SW Attending Franky Del Rio MD   Hosp Day # 0 PCP Jorge Blandon MD     Date:   Iodine (Topical)          Radiology Contrast *      Seasonal                  Sulfa Antibiotics         Tetanus Toxoid              Prescriptions prior to admission:  Phenylephrine-Acetaminophen (WAL-PHED PE SINUS HEADACHE) 5-325 MG Oral Tab problem. Musculoskeletal: Positive for back pain and joint pain. Negative for neck pain. Allergic/Immunologic: Positive for environmental allergies. Neurological: Positive for headaches. Negative for dizziness, seizures, syncope and light-headedness. CA 9.5 04/24/2017   ALB 3.6 04/24/2017   ALKPHO 64 04/24/2017   BILT 0.3 04/24/2017   TP 8.7* 04/24/2017   AST 31 04/24/2017   ALT 33 04/24/2017   T4F 1.0 04/24/2017   TSH 2.150 04/24/2017   MALKA 65 04/24/2017    04/24/2017         Ekg 12-lead    4 No

## 2023-07-24 NOTE — CM/SW NOTE
1143: SW was asked if patient will need a rapid COVID test for HD placement. Any rapid request will need approval and will be addressed 12/07/20.      Pending d/c plan: DME order for lift, BSC, hospital bed, wheelchair, walker w/ platform sent to E and is Itraconazole Counseling:  I discussed with the patient the risks of itraconazole including but not limited to liver damage, nausea/vomiting, neuropathy, and severe allergy.  The patient understands that this medication is best absorbed when taken with acidic beverages such as non-diet cola or ginger ale.  The patient understands that monitoring is required including baseline LFTs and repeat LFTs at intervals.  The patient understands that they are to contact us or the primary physician if concerning signs are noted.

## 2023-11-16 NOTE — TELEPHONE ENCOUNTER
I spoke with patient. Patient had flulike symptoms last week but is feeling much better now. She denies any fevers, chills or shortness of breath. Therefore I recommended that patient continue with self home isolation and to only go out for dialysis. She was instructed to be properly mask when going for dialysis and to use frequent handwashing/hand . She will call if she should develop any fevers, worsening cough or shortness of breath. Patient understands and agrees with plan.
Please call pt  rec'd note from dialysis that a patient at center has been diagnosed with COVID-19  Should pt be tested  Will pt be allowed to be tested?   Please call to discuss/advise  Tasked to nursing
Respiratory infection triage:    Fever:  [x]  No fever  []  Fever>100.4    Cough:  [] Tight cough  [] Cough with exertion  [x] Dry cough  [] Sputum production, Color:     Breathing:  [] Mild shortness of breath interfering with activity  [x] Wheezing  [] Pain with deep breathing  [] Using inhaler    Other symptoms:  [] Sore throat  [] Difficulty swallowing  [] Nasal drainage/congestion  [] Sinus congestion/pressure  [] Ear pain  [] Body aches  [] Poor appetite  [] Loss of sense of smell   [] Loss of sense of taste  []Conjunctivitis? [] Any recent travel? [x] Any sick contacts? [] Are you a healthcare worker? ADDITIONAL NOTES:   Spoke with patient and she states that she is currently \"getting over a cold\"-- has had symptoms for about 10 days now. She was also notified that someone at the dialysis center that she goes to tested positive for COVID 19. Patient notes that cough is still present but is improving. No increased SOB or difficulty breathing (patient typically has mild dyspnea on a regular basis). Wheezing occurs when laying down since cold symptoms started. No inhaler use. She had previously experienced sore throat, ear ache, and body aches but those have been improving for the past 4 days. Diarrhea has been present for 12 days now-- this is slightly improved today. No blood in stool. Hx of IBS. Pushing fluids as best she can. No N,V, and appetite is normal. Eyes are itchy but no redness or drainage. Due to HIPAA she does not know who was positive at dialysis center so she does not know if she came in direct contact with that person. Notified patient that we will route this message to the doctor and see what their recommendations would be. In the meantime, if anything worsens, they were advised to call back or seek emergent evaluation.
When Outside In The Sun, Do You...: mostly burns, rarely tans

## 2024-01-05 NOTE — TELEPHONE ENCOUNTER
Pt called to check status  States pharmacy did not receive approval    Please send Dexilant approval to [facility] Subjective:      Patient ID: Kyra Murry is a 62 y.o. female.    Female with known history of postmenopausal syndrome with progressive improvement to oral Estrace daily as per GYN        Review of Systems   Constitutional: Negative.    HENT: Negative.     Eyes: Negative.    Respiratory: Negative.     Cardiovascular: Negative.    Gastrointestinal: Negative.    Endocrine: Negative.    Musculoskeletal:  Positive for arthralgias.   Skin: Negative.    Allergic/Immunologic: Negative.    Neurological: Negative.    Hematological: Negative.    Psychiatric/Behavioral:  The patient is nervous/anxious.    Past family and social history unremarkable.   Diagnosis Orders   1. Anxiety        2. Mixed hyperlipidemia        3. Influenza vaccination declined        4. H/O: hysterectomy        5. Peripheral venous insufficiency        6. Menopausal vaginal dryness        7. COVID-19 vaccination declined        8. Rotator cuff tendonitis, right        9. Postmenopausal syndrome              Objective:   Physical Exam  Vitals and nursing note reviewed.   Constitutional:       Appearance: She is well-developed.   HENT:      Head: Normocephalic and atraumatic.      Right Ear: External ear normal.      Left Ear: External ear normal.   Eyes:      Conjunctiva/sclera: Conjunctivae normal.      Pupils: Pupils are equal, round, and reactive to light.   Cardiovascular:      Rate and Rhythm: Normal rate and regular rhythm.      Heart sounds: Normal heart sounds.      Comments: History of dyslipidemia  Pulmonary:      Effort: Pulmonary effort is normal.      Breath sounds: Normal breath sounds.   Abdominal:      General: Bowel sounds are normal.      Palpations: Abdomen is soft.   Genitourinary:     Vagina: Normal.      Comments: Postmenopausal syndrome  Musculoskeletal:         General: Normal range of motion.      Cervical back: Normal range of motion and neck supple.      Comments: Degenerative arthritis of the shoulder   Skin:     General:

## (undated) DEVICE — CHLORAPREP 26ML APPLICATOR

## (undated) DEVICE — SUTURE PROLENE 7-0 BV-1

## (undated) DEVICE — INDICATED FOR SURGICAL CLAMPING DURING CARDIOVASCULAR PERIPHERAL VASCULAR, AND GENERAL SURGERY.: Brand: SOFT/FIBRA® SPRING CLIP

## (undated) DEVICE — SUCTION CANISTER, 3000CC,SAFELINER: Brand: DEROYAL

## (undated) DEVICE — DRAPE,EXTREMITY,89X128,STERILE: Brand: MEDLINE

## (undated) DEVICE — SOL  .9 1000ML BTL

## (undated) DEVICE — CV: Brand: MEDLINE INDUSTRIES, INC.

## (undated) DEVICE — EXOFIN TISSUE ADHESIVE 1.0ML

## (undated) DEVICE — GEL AQUASONIC 100 20GR

## (undated) DEVICE — X-RAY DETECTABLE SPONGES,16 PLY: Brand: VISTEC

## (undated) DEVICE — ENDOSCOPY PACK - LOWER: Brand: MEDLINE INDUSTRIES, INC.

## (undated) DEVICE — 3M™ TEGADERM™ TRANSPARENT FILM DRESSING, 1626W, 4 IN X 4-3/4 IN (10 CM X 12 CM), 50 EACH/CARTON, 4 CARTON/CASE: Brand: 3M™ TEGADERM™

## (undated) DEVICE — Device: Brand: DEFENDO AIR/WATER/SUCTION AND BIOPSY VALVE

## (undated) DEVICE — CLIP SM W INTNL HRZN TI TPE LF

## (undated) DEVICE — NEEDLE HPO 18GA 1.5IN ECLPS

## (undated) DEVICE — REM POLYHESIVE ADULT PATIENT RETURN ELECTRODE: Brand: VALLEYLAB

## (undated) DEVICE — STERILE POLYISOPRENE POWDER-FREE SURGICAL GLOVES: Brand: PROTEXIS

## (undated) DEVICE — Device: Brand: JELCO

## (undated) DEVICE — STERILE TETRA-FLEX CF, ELASTIC BANDAGE, 4" X 5.5YD: Brand: TETRA-FLEX™CF

## (undated) DEVICE — SOL  .9 1000ML BAG

## (undated) DEVICE — BANDAGE ROLL,100% COTTON, 6 PLY, LARGE: Brand: KERLIX

## (undated) DEVICE — SUTURE SILK 4-0 SA63H

## (undated) DEVICE — 6 ML SYRINGE LUER-LOCK TIP: Brand: MONOJECT

## (undated) DEVICE — DECANTER SPIKE TRANSFLOW

## (undated) DEVICE — SUTURE ETHILON 3-0 669H

## (undated) DEVICE — COVER SGL STRL LGHT HNDL BLU

## (undated) DEVICE — SUTURE PROLENE 6-0 BV-1

## (undated) DEVICE — SUTURE VICRYL 3-0 SH

## (undated) DEVICE — SUCTION SARNS MICRO SUCKER

## (undated) DEVICE — CLIP SM INTNL HMCLP TNTLM ESCP

## (undated) DEVICE — SUTURE SILK 2-0

## (undated) DEVICE — GAUZE SPONGES,12 PLY: Brand: CURITY

## (undated) DEVICE — DERMABOND LIQUID ADHESIVE

## (undated) DEVICE — FORCEP RADIAL JAW 4

## (undated) DEVICE — HOVERMATT 34IN SINGLE USE

## (undated) DEVICE — SUTURE SILK 2-0 SA65H

## (undated) DEVICE — ENDOSCOPY PACK UPPER: Brand: MEDLINE INDUSTRIES, INC.

## (undated) DEVICE — GAMMEX® PI HYBRID SIZE 8.5, STERILE POWDER-FREE SURGICAL GLOVE, POLYISOPRENE AND NEOPRENE BLEND: Brand: GAMMEX

## (undated) DEVICE — ABSORBABLE HEMOSTAT (OXIDIZED REGENERATED CELLULOSE, U.S.P.): Brand: SURGICEL

## (undated) NOTE — LETTER
[facility], [place]  Authorization for Invasive Procedures  1.  I hereby authorize  Dr. Magno Anderson , my physician and whomever may be designated as the doctor's assistant, to perform the following operation and/or procedure: Left A performed for the purposes of advancing medicine, science, and/or education, provided my identity is not revealed. If the procedure has been videotaped, the physician/surgeon will obtain the original videotape.  The hospital will not be responsible for stor My signature below affirms that prior to the time of the procedure, I have explained to the patient and/or her legal representative, the risks and benefits involved in the proposed treatment and any reasonable alternative to the proposed treatment.  I have

## (undated) NOTE — LETTER
[facility], [place]  Authorization for Invasive Procedures  1.  I hereby authorize Dr. Martin Reis** , my physician and whomever may be designated as the doctor's assistant, to perform the following operation and/or procedure:  *Re 5. I consent to the photographing of the operations or procedures to be performed for the purposes of advancing medicine, science, and/or education, provided my identity is not revealed.  If the procedure has been videotaped, the physician/surgeon will obta __________ Time: ___________    Statement of Physician  My signature below affirms that prior to the time of the procedure, I have explained to the patient and/or her legal representative, the risks and benefits involved in the proposed treatment and any r

## (undated) NOTE — IP AVS SNAPSHOT
[facility]            (For Outpatient Use Only) Initial Admit Date: 12/9/2020   Inpt/Obs Admit Date: Inpt: N/A / Obs: 12/09/20   Discharge Date:    Hospital Acct:  [de-identified]   MRN: [de-identified]   CSN: [id number]   CEID: [id number]        EN Subscriber ID: [id number] Pt Rel to Subscriber: SELF   TERTIARY INSURANCE   Payor:  Plan:    Group Number:  Insurance Type:    Subscriber Name:  Subscriber :    Subscriber ID:  Pt Rel to Subscriber:    Hospital Account Financial Class: Medicare    Decemb

## (undated) NOTE — IP AVS SNAPSHOT
[facility]            (For Outpatient Use Only) Initial Admit Date: 12/2/2020   Inpt/Obs Admit Date: Inpt: 12/2/20 / Obs: N/A   Discharge Date:    Carylon Kawasaki:  [de-identified]   MRN: [de-identified]   CSN: [id number]   CEID: [id number]        [employer] TERTIARY INSURANCE   Payor:  Plan:    Group Number:  Insurance Type:    Subscriber Name:  Subscriber :    Subscriber ID:  Pt Rel to Subscriber:    Hospital Account Financial Class: Medicare    2020

## (undated) NOTE — ED AVS SNAPSHOT
Jeri Grier   MRN: [id number]    Department:  [facility] Emergency Department   Date of Visit:  8/23/2017           Disclosure     Insurance plans vary and the physician(s) referred by the ER may not be covered by your plan.  Please c CARE PHYSICIAN AT ONCE OR RETURN IMMEDIATELY TO THE EMERGENCY DEPARTMENT. If you have been prescribed any medication(s), please fill your prescription right away and begin taking the medication(s) as directed.   If you believe that any of the medications

## (undated) NOTE — ED AVS SNAPSHOT
Inna Smiley   MRN: [id number]    Department:  [facility] Emergency Department   Date of Visit:  6/18/2018           Disclosure     Insurance plans vary and the physician(s) referred by the ER may not be covered by your plan.  Please c CARE PHYSICIAN AT ONCE OR RETURN IMMEDIATELY TO THE EMERGENCY DEPARTMENT. If you have been prescribed any medication(s), please fill your prescription right away and begin taking the medication(s) as directed.   If you believe that any of the medications

## (undated) NOTE — IP AVS SNAPSHOT
[address]  [address] [facility] [phone]                Discharge Summary   6/13/2017    Heidi Vivas           Admission Information        Provider Department    6/13/2017 Annette Rudolph MD 1 each by Other route daily.     Carlos Manuel Qugio                        Blood Glucose Test Strp        Test blood sugar four times daily    Suki Richard                        insulin aspart 100 UNIT/ML Sopn   Last time this was given:  10 Units on 6/ 63 (L) (06/15/17)  12.2 (H)    (06/14/17)  6.2 (06/14/17)  2.63 (L) (06/14/17)  8.3 (L) (06/14/17)  24.9 (L) (06/14/17)  94.5    (06/14/17)  46 (L) (06/14/17)  12.2 (H)    (06/13/17)  11.7 (H) (06/13/17)  3.31 (L) (06/13/17)  10.5 (L) (06/13/17)  31.3 (L) Additional Information       We are concerned for your overall well being:    - If you are a smoker or have smoked in the last 12 months, we encourage you to explore options for quitting.     - If you have concerns related to behavioral health issues or th call your provider or healthcare team if you have any questions regarding your medications while at home.          Blood Sugar Medications     Insulin Glargine (BASAGLAR KWIKPEN) 100 UNIT/ML Subcutaneous Solution Pen-injector    insulin aspart (NOVOLOG FLEX

## (undated) NOTE — Clinical Note
TCM call completed. A TE was sent to the office for an appointment request, and to provide a condition update on the patient's elevated glucose readings at home. Thank you.

## (undated) NOTE — LETTER
[address] [place] [address]  Authorization for Invasive Procedures  1.  I hereby authorize Dr. Kerry Lucas / Anjum Chaviraan, my physician and whomever may be designated as the doctor's assistant, to perform the following operation and/or procedure: following are some, but not all, of the potential risks that can occur: fever and allergic reactions, hemolytic reactions, transmission of disease such as hepatitis, AIDS, cytomegalovirus (CMV), and flluid overload.  In the event that I wish to have autolog administration of sedation/analgesia as may be necessary or desirable in the judgment of my physician.      Signature of Patient:  ________________________________________________ Date: _________Time: _________    Responsible person in case of minor or unco

## (undated) NOTE — LETTER
[address] [place] [address]  Authorization for Invasive Procedures  1.  I hereby authorize Dr. Sai Reina or Obdulio Sosa , my physician and whomever may be designated as the doctor's assistant, to perform the following operation and/or procedure: performed for the purposes of advancing medicine, science, and/or education, provided my identity is not revealed. If the procedure has been videotaped, the physician/surgeon will obtain the original videotape.  The hospital will not be responsible for stor My signature below affirms that prior to the time of the procedure, I have explained to the patient and/or her legal representative, the risks and benefits involved in the proposed treatment and any reasonable alternative to the proposed treatment.  I have

## (undated) NOTE — LETTER
[address], [place]  Authorization for Invasive Procedures  1. I hereby authorize  Dr Osmar Delaney , my physician and whomever may be designated as the doctor's assistant, to perform the following operation and/or procedure:   Tunnel 5. I consent to the photographing of the operations or procedures to be performed for the purposes of advancing medicine, science, and/or education, provided my identity is not revealed.  If the procedure has been videotaped, the physician/surgeon will obta __________ Time: ___________    Statement of Physician  My signature below affirms that prior to the time of the procedure, I have explained to the patient and/or her legal representative, the risks and benefits involved in the proposed treatment and any r

## (undated) NOTE — LETTER
[facility], [place]  Authorization for Invasive Procedures  1.  I hereby authorize  Dr. Abigail Fritz , my physician and whomever may be designated as the doctor's assistant, to perform the following operation and/or procedure: performed for the purposes of advancing medicine, science, and/or education, provided my identity is not revealed. If the procedure has been videotaped, the physician/surgeon will obtain the original videotape.  The hospital will not be responsible for stor My signature below affirms that prior to the time of the procedure, I have explained to the patient and/or her legal representative, the risks and benefits involved in the proposed treatment and any reasonable alternative to the proposed treatment.  I have

## (undated) NOTE — LETTER
[facility], [place]  Authorization for Invasive Procedures  1.  I hereby authorize Dr. Nikki Lau or Dalton Santana , my physician and whomever may be designated as the doctor's assistant, to perform the following operation and/or proce performed for the purposes of advancing medicine, science, and/or education, provided my identity is not revealed. If the procedure has been videotaped, the physician/surgeon will obtain the original videotape.  The hospital will not be responsible for stor My signature below affirms that prior to the time of the procedure, I have explained to the patient and/or her legal representative, the risks and benefits involved in the proposed treatment and any reasonable alternative to the proposed treatment.  I have

## (undated) NOTE — IP AVS SNAPSHOT
[facility]            (For Outpatient Use Only) Initial Admit Date: 2022   Inpt/Obs Admit Date: Inpt: 22 / Obs: N/A   Discharge Date:    Inocencio Ferrer:  [de-identified]   MRN: [de-identified]   CSN: [id number]   CEID: [id number]        ENCOUNTER  Patient Class: Inpatient Admitting Provider: Viviane Li MD Unit: [address] [place], [place] [address]W/SE   Hospital Service: Medical Attending Provider: Viviane Li MD   Bed: 569-A   Visit Type:   Referring Physician: No ref. provider found Billing Flag:    Admit Diagnosis: Hypoxia [R09.02]      PATIENT  Legal Name:   Clifton Figueroa   Legal Sex: Female  Gender ID: Female             Pref Name:   Sarina Pierre PCP:  Cierra Ruiz MD Home: [phone]   Address:  [address] : 3/7/1955 (67 yrs) Mobile: [phone]         [place] [address] Marital:  Language: [address] [place] [address] Drive: [employer]: [id number] Scientology: [address] Not [place]*     Race: White Ethnicity: Non  Or [address] [place] [address] Street   Name Relationship Legal Guardian? Home Phone Work Phone Mobile Phone   1. Milla Brambila  2.  Carl Brambila Daughter  Son          [phone]     GUARANTOR  Guarantor: Oscar Silva : 3/7/1955 Home Phone: [phone]   Address: [address]  Sex: Female Work Phone:    City/State/Zip: [place] [facility]   Rel. to Patient: Self Guarantor ID: [id number]   GUARANTOR EMPLOYER   Employer:  Status: DISABLED     COVERAGE  PRIMARY INSURANCE   Payor: MEDICARE Plan: MEDICARE PART A&B   Group Number:  Insurance Type: INDEMNITY   Subscriber Name: Ketty Robles : 1955   Subscriber ID: [id number] Pt Rel to Subscriber: Self   SECONDARY INSURANCE   Payor: [place] PPO Plan: [employer] [facility] PPO   Group Number: [id number] Type: [id number] Name: Ketty Robles : 3/7/1955   Subscriber ID: [id number] Pt Rel to Subscriber: SELF   TERTIARY INSURANCE   Payor:  Plan: Group Number:  Insurance Type:    Subscriber Name:  Subscriber :    Subscriber ID:  Pt Rel to Subscriber:    Hospital Account Financial Class: Medicare    2022

## (undated) NOTE — IP AVS SNAPSHOT
Patient Demographics     Address  [address] [place] [address] Phone  [phone] (Home)  [phone] (Mobile) *Preferred* E-mail Address  [email or web address]      Emergency Contact(s)     Name Relation Home Work Mobile    Heydi Brambila Notes to patient: LAXATIVE FOR CONSTIPATION      Place 1 suppository (10 mg total) rectally daily as needed.    Destin Morton MD         docusate sodium 100 MG Caps  Commonly known as: COLACE  Next dose due: TONIGHT  Notes to patient: STOOL SOFTENER Place rectally 2 (two) times daily as needed. Eliceo Wilson MD         Revlimid 5 MG Caps  Generic drug: Lenalidomide  Next dose due: 21 day cycle completed on 12/2, to be restarted 12/10      Take 5 mg by mouth on days 1-21 of a 28 day cycle.   PA # [id number] Heparin Sodium (Porcine) 5000 UNIT/ML injection 5,000 Units 12/09/20 0618 Given      [id number] insulin detemir (LEVEMIR) 100 UNIT/ML flextouch 52 Units 12/08/20 2108 Given              Recent Vital Signs       Most Recent Value   Vitals  109/58 Syncope  Fatigue[CP. 2]    HPI:[CP.1]   Courtney Tongisha Obregon[CP.2] is a(n)[CP.3 63year old[CP.2] female past medical history of IgA lambda multiple myeloma (on RVD; follows with Dr. Yusuf Alanis), ESRD due to kappa light chain nephropathy on hemodialysi In t[CP. 3]he emergency department, her blood pressures were 95/56 with rest of her vital signs stable on room air. Admitted for further management and assistance for home health services as they would not like her to be placed in a rehab facility. [CP.1] O • Hypertension Mother    • Cancer Sister         sinus[CP.2]     Social History:[CP.1]  Social History    Tobacco Use      Smoking status: Never Smoker      Smokeless tobacco: Never Used    Alcohol use: No      Alcohol/week: 0.0 standard drinks    Drug use •  HYDROcodone-acetaminophen 5-325 MG Oral Tab, Take 1 tablet by mouth every 6 (six) hours as needed for Pain. •  Meloxicam 15 MG Oral Tab, Take 1 tablet (15 mg total) by mouth daily.     •  DEXILANT 60 MG Oral Capsule Delayed Release, TAKE 1 CAPSULE BY Psych: Anxiety, Depression, Insomnia, Suicidal Ideation, Homicidal ideation, Memory Changes  Heme/Lymph: Bruising, Bleeding, Lymphadenopathy  Endocrine: Polyuria, Polydipsia, Temperature Intolerance    Physical Exam:   Vital Signs:[CP.1]  Blood pressure 10 INR 1.13 07/10/2020    T4F 1.1 06/26/2019    TSH 2.280 06/17/2020    MALKA 65 04/24/2017    LIP 90 06/26/2019    MG 2.2 12/02/2020    PHOS 5.6 (H) 10/10/2017    B12 660 06/17/2020[CP.2]       EKG 12/2/2020  Sinus rhythm with infrequent PACs[CP. 1]    No resu Pathologic intra-articular fracture of the olecranon. Assessment/Plan:       Multiple myeloma IgA lambda and kappa light chain nephropathy  Follows with  of heme/onc. Diagnosed in 2017 with presentation of renal failure.   Renal biopsy show Likely multifactorial from ESRD, multiple myeloma, and active chemotherapy  Home medications: Ferric citrate 210 mg daily  –Daily CBCs    Type 2 diabetes mellitus  Hemoglobin A1c[CP.1]: 8.9% on admission[CP. 6]  Home medications: Glargine 52 units in the sk H&P signed by Sobeida Henry MD at 12/3/2020 12:57 PM  Version 2 of 3    Author: Sobeida Henry MD Service: Internal Medicine Author Type: Physician    Filed: 12/3/2020 12:57 PM Date of Service: 12/3/2020  8:19 AM Status: Addendum    : Sobeida Henry, Per chart review, patient was admitted to [facility] on 11/16/2020 with progressively severe right back pain. Of note, patient was found to have right posterior rib pathological fractures on ribs 8 and 10 from CT scans 11/13/2020.   Orthopedic surgery • Obesity Life long   • Obesity, diabetes, and hypertension syndrome ([facility]    • Osteoarthritis    • Renal failure      Past Surgical History:   Procedure Laterality Date   • A.V. FISTULA Left 8/18/2017    Performed by Gentry Zuniga MD at [facility] OR   • C •  Glucose Blood (BLOOD GLUCOSE TEST) In Vitro Strip, Test blood sugar four times daily    •  Glucose Blood (ACCU-CHEK DEBORAH PLUS) In Vitro Strip, 1 each by Other route daily.     •  Blood Glucose Monitoring Suppl (ACCU-CHEK AFIA SMARTVIEW) W/DEVICE Does no ENT/Mouth:  Hearing Changes, Ear Pain, Nasal Congestion, Sinus Pain, Hoarseness, Sore throat, Rhinorrhea, Swallowing Difficulty  Eyes: Eye Pain, Swelling, Redness, Foreign Body, Discharge, Vision Changes  Cardiovascular: Chest Pain, SOB, PND, Dyspnea on Ex Neurologic: No focal neurological deficits, CN II-XII intact, light touch intact, MSK Strength 5/5 and symmetric in all extremities, normal gait  Musculoskeletal: Full range of motion of all extremities, no clubbing/swelling/edema  Skin: No lesions, No jame examination submitted in brace.  Additional well-defined lucent lesions of the  ulnar diaphysis presumably multiple myeloma.   X-ray tibia-fibula      X-ray of the tibia and fibula 11/18/2020  IMPRESSION: RIGHT  Bilateral tibia fibula lucent lesions consist First occurrence 5 weeks ago after a fall. Imaging reviewed as above with lytic lesions and pathological fractures. Status post olecranon ORIF at [facility], operated by [facility] on 11/20/2020.   Home medications: Tramadol 50 mg every 6 hours as needed for pain, meloxicam 15 mg da Daughter would like to hold off on placement in subacute rehab facility at this time. We will request PT/OT/social work to assist in appropriate discharge disposition.   Daughter is open to home health services with appropriate medical equipment at[CP.1] h Olivia Garciamarci Obregon[CP.2] is a(n)[CP.3 63year old[CP.2] female past medical history of IgA lambda multiple myeloma (on RVD; follows with Dr. Toshia Guerrero), ESRD due to kappa light chain nephropathy on hemodialysis (Tuesday/Thursday/Saturday), acute on • Heart Disease Father    • Pulmonary Disease Mother         COPD   • Hypertension Mother    • Cancer Sister         sinus[CP.2]     Social History:[CP.1]  Social History    Tobacco Use      Smoking status: Never Smoker      Smokeless tobacco: Never Used INR 1.13 07/10/2020    T4F 1.1 06/26/2019    TSH 2.280 06/17/2020    MAKLA 65 04/24/2017    LIP 90 06/26/2019    MG 2.2 12/02/2020    PHOS 5.6 (H) 10/10/2017    B12 660 06/17/2020[CP.2]       EKG 12/2/2020  Sinus rhythm with infrequent PACs[CP. 1]    No resu Home medications: Ferric citrate 210 mg daily  –Daily CBCs    Type 2 diabetes mellitus  Hemoglobin A1c[CP.1]: 8.9% on admission[CP.5]  Home medications: Glargine 52 units in the skin every evening  -Accu-Cheks  –Hypoglycemia protocol  –Diabetic diet    hyp Filed: 12/3/2020 10:16 PM Date of Service: 12/3/2020 12:33 PM Status: Signed    : Ashley Peraza MD (Physician)       Hematology/Oncology Consult Note        NAME:[SB.1] Jaqueline Obregon[SB.2] - ROOM: 473/473-A - MRN: [id number] - Age: Alcohol/week: 0.0 standard drinks[SB. 2]    Medications:[SB.1]  • acyclovir  200 mg Oral BID   • Montelukast Sodium  10 mg Oral Nightly   • insulin detemir  52 Units Subcutaneous Nightly   • Insulin Aspart Pen  1-11 Units Subcutaneous TID CC   • Deniz - recent relapse with pathologic fracture s/p surgery at [facility] 2 weeks ago  - started on RVD  - okay to take revlamid qod while in hospital  - daily acyclovir   - I plan to restart velcade on Monday in office     Pathologic Fracture  - ORIF at [address] Filed: 12/7/2020 11:12 AM Date of Service: 12/7/2020 11:12 AM Status: Signed    : Carly Schafer PTA (Physical Therapy Assistant)       Attempted treatment pt having dialysis. Will follow up as appropriate. RN aware. [RM.1]      Attribution Key Na+ Modeling Yes (please comment Sodium amount)    Dialyzer F160    Dialysate Temperature (C) 37    BFR-As tolerated to a maximum of: 400 ml/min     mL/min    Duration of Treatment 3.5 Hours    Dry weight (kg) or fluid removal (L) 2-3    Access Site Reason for Continuing Central Line  Hemodialysis   Hemodialysis   Hemodialysis   —   Hemodialysis    AV Fistula  Present; Thrill;Bruit   Thrill;Bruit   Thrill;Bruit   —   Thrill;Bruit    Status  —   —   —   —   Deaccessed    CHG Impregnated Disc  —   —   — If No, Reason Why?  —   —   —   —   —    Dressing Status  —   Clean;Dry; Intact   Clean;Dry; Intact   —   Other (Comment)    Site Condition  —   No complications   —   —   —    Dressing  —   Gauze   Gauze   Open to air (None)   Open to air (None)    Dressing Dressing Change Due  —   —   —   —   —    Drainage Description  —   Mike Yusuf   —     Output  —   —   —   —   —            10/05/17 1500 10/04/17 2217 10/04/17 0900 10/03/17 2200 10/03/17 0933   Site Assessment  Clean;Dry; Intact   Clean;Dry; Intact   Ashley Site Assessment  Clean;Dry; Intact   Clean;Dry; Intact   Clean;Dry; Intact      Reason for Continuing Central Line  Hemodialysis   Hemodialysis   Hemodialysis      AV Fistula  Bruit   Bruit   Bruit      Status  —   —   Other (Comment)      CHG Impregnated Dis

## (undated) NOTE — LETTER
[facility], [place]  Authorization for Invasive Procedures  1.  I hereby authorize Dr. Amberly Klein or Olya Shine , my physician and whomever may be designated as the doctor's assistant, to perform the following operation and/or proce performed for the purposes of advancing medicine, science, and/or education, provided my identity is not revealed. If the procedure has been videotaped, the physician/surgeon will obtain the original videotape.  The hospital will not be responsible for stor My signature below affirms that prior to the time of the procedure, I have explained to the patient and/or her legal representative, the risks and benefits involved in the proposed treatment and any reasonable alternative to the proposed treatment.  I have

## (undated) NOTE — IP AVS SNAPSHOT
Patient Demographics     Address  [address] [place] [address] Phone  [phone] (Home)  [phone] (Mobile) *Preferred* E-mail Address  [email or web address]      Emergency Contact(s)     Name Relation Home Work Nel Brambila Ma Ish Goldberg MD         DULoxetine HCl 30 MG Cpep  Commonly known as: CYMBALTA  Next dose due: Tomorrow 9 am      Take 1 capsule (30 mg total) by mouth daily.    Ish Goldberg MD         Insulin Aspart Pen 100 UNIT/ML Sopn  Commonly known as: Calos Santoyo ** SITE UNKNOWN **     Order ID Medication Name Action Time Action Reason Comments    [id number] DULoxetine HCl (CYMBALTA) DR particles cap 30 mg 12/11/20 0949 Given      [id number] Dicyclomine HCl (BENTYL) cap 10 mg 12/11/20 0949 Given      [id number] Shaina No matching results found     Microbiology Results (All)     Procedure Component Value Units Date/Time    Rapid SARS-CoV-2 by PCR STAT [[id number]]  (Normal) Collected: 12/09/20 2106    Order Status: Completed Lab Status: Final result Updated: 12/09/20 2135 Remission   • Diabetes ([facility]    • Dialysis patient ([facility]     HD   • HTN (hypertension)    • Lymphedema of lower extremity    • Microscopic colitis    • Multiple myeloma (HCC)    • Obesity Life long   • Obesity, diabetes, and hypertension syndrome ([facility] •  HYDROcodone-acetaminophen  MG Oral Tab, Take 1 tablet by mouth every 4 (four) hours as needed. •  Montelukast Sodium 10 MG Oral Tab, Take 10 mg by mouth nightly.     •  DULoxetine HCl 30 MG Oral Cap DR Particles, Take 1 capsule (30 mg total) by Neurological: Negative for dizziness, light-headedness and headaches. Psychiatric/Behavioral: Negative for agitation. The patient is not nervous/anxious.         Physical Exam:   Vital Signs:[MH.1]  Blood pressure 107/49, pulse 79, temperature 99 °F (37.2 No results found. [MH.2]        Assessment/Plan:[MH.1]   Principal Problem:    Encounter for examination for admission to nursing home  Active Problems:    ESRD (end stage renal disease) ([facility] [address]    Multiple myeloma (HCC)    Lymphedema    HTN (hypertension) MH.2 - Tawnya North MD on 12/10/2020  8:27 AM                        Consults - MD Consult Notes      Consults signed by Edith Severino MD at 12/11/2020  9:50 AM     Author: Edith Severino MD Service: Nephrology Author Type: Physician    Filed: 12/1 • FRACTURE SURGERY     •      • OTHER      T and A   • OTHER      C section x 2   • OTHER      ovarian cyst   • OTHER SURGICAL HISTORY      ? RSO for cyst   • OTHER SURGICAL HISTORY      lymphedema surgery   • TONSILLECTOMY     • TUBAL Danya Patella •  Miconazole Nitrate 2 % External Powder, Apply topically. •  Phenylephrine-Mineral Oil-Pet 0.25-14-74.9 % Rectal Ointment, Place rectally 2 (two) times daily as needed. •  Montelukast Sodium 10 MG Oral Tab, Take 10 mg by mouth nightly.     •  DULoxe A comprehensive 12 point review of systems was completed. Pertinent positives as above and all the rest were negative.      Physical Exam:   /48 (BP Location: Left leg)   Pulse 69   Temp 97.5 °F (36.4 °C) (Oral)   Resp 16   Ht 5' 7\" (1.702 m)   Wt ( Attribution Wolff    MR.1 - Leonela Gillette MD on 12/11/2020  9:48 AM                     D/C Summary    No notes of this type exist for this encounter.      Physical Therapy Notes (last 72 hours) (Notes from 12/8/2020 11:10 AM through 12/11/2020 11:10 AM) 12/11/20 0000  Hemodialysis inpatient  Tomorrow     Question Answer Comment   K 2 mEq    Ca++ 2.5 mEq    Bicarb 35 mEq    Na 140 mEq    Na+ Modeling Yes (please comment Sodium amount)    Dialyzer F160    Dialysate Temperature (C) 37    BFR-As tolerated to Site Condition  —   —   —   —   —    Dressing  Gauze   Gauze   Gauze   Open to air (None)   —    Dressing Change Due  —   —   —   —   —    Drainage Description  —   —   —   —   —    HD Output  —   —   —   —   2000 mL            12/07/20 1000 12/07/20 0059 12/02/20 2110 10/10/17 1110 10/09/17 2159 10/09/17 1038 10/08/17 2100   Site Assessment  Clean;Dry; Intact   Clean;Dry; Intact   Clean;Dry; Intact   Clean;Dry; Intact   Clean;Dry; Intact    Reason for Continuing [address]  Hemodialysis   —   —   —   —    [place] AV Fistula  Present; Thrill;Bruit   Thrill;Bruit;Present   Bruit; Thrill   Thrill;Bruit;Present   Thrill;Bruit;Present    Status  —   —   —   —   —    CHG Impregnated Disc  —   —   —   —   —    If No, Reason Why?  —   —   —   —   —    Dressing Status  Clean; Dressing Status  Clean;Dry; Intact   Clean;Dry; Intact   Clean;Dry; Intact   Clean;Dry; Intact     Site Condition  —   —   —   —     Dressing  Occlusive   Occlusive   Occlusive   Occlusive     Dressing Change Due  —   —   —   —     Drainage Description  —   —

## (undated) NOTE — IP AVS SNAPSHOT
[address]  [address] [facility] ~ [phone]                Discharge Summary   4/25/2017    [id number] [place] [address]           Admission Information        Provider Department    4/25/2017 Claudine Leventhal, MD MetFORMIN HCl  MG Tb24   Commonly known as:  GLUCOPHAGE-XR   Next dose due:  4/29 with dinner        Take 1 tablet (500 mg total) by mouth 2 (two) times daily with meals.     Roge Fenton                              SITagliptin Phosphate 100 MG Ensure you take your blood sugar before administering our insulin    Discharge References/Attachments     HEMODIALYSIS (ENGLISH)    CENTRAL VEIN ACCESS, CARING FOR YOUR (ENGLISH)    A1C (ENGLISH)    BLOOD SUGAR LOG, USING A (ENGLISH)    BLOOD SUGAR, LOW; H 11.0 (L) (04/28/17)  33.4 (L) (04/25/17)  94.3    (04/25/17)  151 (04/25/17)  9.5      Recent Hematology Lab Results (cont.)  (Last 3 results in the past 90 days)    Neutrophil % Lymphocyte % Monocyte % Eosinophil % Basophil % Prelim Neut Abs Final Neut Ab harming yourself, contact [facility] at [phone]. - If you don’t have insurance, Amalia Jasso has partnered with Patient [address] to help you get signed up for insurance coverage.   Patient Valera SitaGLIPtin Phosphate (JANUVIA) 100 MG Oral Tab         Use:  Treat high blood sugar   Most common side effects: Low blood sugar:nausea, jitters, sweating, rapid heartbeat    What to report to your healthcare team:  Low blood sugar (less than 70) twice a